# Patient Record
Sex: MALE | Race: WHITE | Employment: OTHER | URBAN - METROPOLITAN AREA
[De-identification: names, ages, dates, MRNs, and addresses within clinical notes are randomized per-mention and may not be internally consistent; named-entity substitution may affect disease eponyms.]

---

## 2018-12-11 ENCOUNTER — HOSPITAL ENCOUNTER (INPATIENT)
Age: 83
LOS: 1 days | Discharge: HOME | DRG: 301 | End: 2018-12-12
Attending: EMERGENCY MEDICINE | Admitting: INTERNAL MEDICINE
Payer: MEDICARE

## 2018-12-11 ENCOUNTER — APPOINTMENT (OUTPATIENT)
Dept: CT IMAGING | Age: 83
DRG: 301 | End: 2018-12-11
Attending: EMERGENCY MEDICINE
Payer: MEDICARE

## 2018-12-11 ENCOUNTER — APPOINTMENT (OUTPATIENT)
Dept: GENERAL RADIOLOGY | Age: 83
DRG: 301 | End: 2018-12-11
Attending: EMERGENCY MEDICINE
Payer: MEDICARE

## 2018-12-11 DIAGNOSIS — D73.5 SPLENIC INFARCT: Primary | ICD-10-CM

## 2018-12-11 DIAGNOSIS — I82.890 SPLENIC VEIN THROMBOSIS: ICD-10-CM

## 2018-12-11 PROBLEM — N40.0 PROSTATE ENLARGEMENT: Status: ACTIVE | Noted: 2018-12-11

## 2018-12-11 LAB
ALBUMIN SERPL-MCNC: 3.5 G/DL (ref 3.5–5)
ALBUMIN/GLOB SERPL: 1.1 {RATIO} (ref 1.1–2.2)
ALP SERPL-CCNC: 103 U/L (ref 45–117)
ALT SERPL-CCNC: 20 U/L (ref 12–78)
ANION GAP SERPL CALC-SCNC: 9 MMOL/L (ref 5–15)
AST SERPL-CCNC: 21 U/L (ref 15–37)
BASOPHILS # BLD: 0.1 K/UL (ref 0–0.1)
BASOPHILS NFR BLD: 1 % (ref 0–1)
BILIRUB SERPL-MCNC: 0.6 MG/DL (ref 0.2–1)
BUN SERPL-MCNC: 14 MG/DL (ref 6–20)
BUN/CREAT SERPL: 12 (ref 12–20)
CALCIUM SERPL-MCNC: 8.8 MG/DL (ref 8.5–10.1)
CHLORIDE SERPL-SCNC: 100 MMOL/L (ref 97–108)
CO2 SERPL-SCNC: 31 MMOL/L (ref 21–32)
COMMENT, HOLDF: NORMAL
CREAT SERPL-MCNC: 1.18 MG/DL (ref 0.7–1.3)
D DIMER PPP FEU-MCNC: 1.92 MG/L FEU (ref 0–0.65)
DIFFERENTIAL METHOD BLD: NORMAL
EOSINOPHIL # BLD: 0.1 K/UL (ref 0–0.4)
EOSINOPHIL NFR BLD: 1 % (ref 0–7)
ERYTHROCYTE [DISTWIDTH] IN BLOOD BY AUTOMATED COUNT: 13 % (ref 11.5–14.5)
GLOBULIN SER CALC-MCNC: 3.3 G/DL (ref 2–4)
GLUCOSE SERPL-MCNC: 103 MG/DL (ref 65–100)
HCT VFR BLD AUTO: 45.5 % (ref 36.6–50.3)
HGB BLD-MCNC: 15.3 G/DL (ref 12.1–17)
IMM GRANULOCYTES # BLD: 0 K/UL (ref 0–0.04)
IMM GRANULOCYTES NFR BLD AUTO: 0 % (ref 0–0.5)
LACTATE SERPL-SCNC: 1.1 MMOL/L (ref 0.4–2)
LYMPHOCYTES # BLD: 2.7 K/UL (ref 0.8–3.5)
LYMPHOCYTES NFR BLD: 27 % (ref 12–49)
MCH RBC QN AUTO: 32.1 PG (ref 26–34)
MCHC RBC AUTO-ENTMCNC: 33.6 G/DL (ref 30–36.5)
MCV RBC AUTO: 95.6 FL (ref 80–99)
MONOCYTES # BLD: 1 K/UL (ref 0–1)
MONOCYTES NFR BLD: 10 % (ref 5–13)
NEUTS SEG # BLD: 6 K/UL (ref 1.8–8)
NEUTS SEG NFR BLD: 61 % (ref 32–75)
NRBC # BLD: 0 K/UL (ref 0–0.01)
NRBC BLD-RTO: 0 PER 100 WBC
PLATELET # BLD AUTO: 163 K/UL (ref 150–400)
PMV BLD AUTO: 9.4 FL (ref 8.9–12.9)
POTASSIUM SERPL-SCNC: 3.9 MMOL/L (ref 3.5–5.1)
PROT SERPL-MCNC: 6.8 G/DL (ref 6.4–8.2)
RBC # BLD AUTO: 4.76 M/UL (ref 4.1–5.7)
SAMPLES BEING HELD,HOLD: NORMAL
SODIUM SERPL-SCNC: 140 MMOL/L (ref 136–145)
TROPONIN I SERPL-MCNC: <0.05 NG/ML
WBC # BLD AUTO: 9.9 K/UL (ref 4.1–11.1)

## 2018-12-11 PROCEDURE — 99285 EMERGENCY DEPT VISIT HI MDM: CPT

## 2018-12-11 PROCEDURE — 36415 COLL VENOUS BLD VENIPUNCTURE: CPT

## 2018-12-11 PROCEDURE — 83605 ASSAY OF LACTIC ACID: CPT

## 2018-12-11 PROCEDURE — 85025 COMPLETE CBC W/AUTO DIFF WBC: CPT

## 2018-12-11 PROCEDURE — 74177 CT ABD & PELVIS W/CONTRAST: CPT

## 2018-12-11 PROCEDURE — 84484 ASSAY OF TROPONIN QUANT: CPT

## 2018-12-11 PROCEDURE — 85379 FIBRIN DEGRADATION QUANT: CPT

## 2018-12-11 PROCEDURE — 93005 ELECTROCARDIOGRAM TRACING: CPT

## 2018-12-11 PROCEDURE — 71045 X-RAY EXAM CHEST 1 VIEW: CPT

## 2018-12-11 PROCEDURE — 80053 COMPREHEN METABOLIC PANEL: CPT

## 2018-12-11 PROCEDURE — 74011636320 HC RX REV CODE- 636/320: Performed by: EMERGENCY MEDICINE

## 2018-12-11 PROCEDURE — 85730 THROMBOPLASTIN TIME PARTIAL: CPT

## 2018-12-11 PROCEDURE — 85610 PROTHROMBIN TIME: CPT

## 2018-12-11 RX ADMIN — IOPAMIDOL 100 ML: 755 INJECTION, SOLUTION INTRAVENOUS at 23:29

## 2018-12-12 ENCOUNTER — APPOINTMENT (OUTPATIENT)
Dept: ULTRASOUND IMAGING | Age: 83
DRG: 301 | End: 2018-12-12
Attending: INTERNAL MEDICINE
Payer: MEDICARE

## 2018-12-12 VITALS
BODY MASS INDEX: 27.09 KG/M2 | SYSTOLIC BLOOD PRESSURE: 132 MMHG | RESPIRATION RATE: 19 BRPM | TEMPERATURE: 98.7 F | OXYGEN SATURATION: 94 % | HEART RATE: 67 BPM | WEIGHT: 200 LBS | DIASTOLIC BLOOD PRESSURE: 73 MMHG | HEIGHT: 72 IN

## 2018-12-12 PROBLEM — I82.890 SPLENIC VEIN THROMBOSIS: Status: ACTIVE | Noted: 2018-12-12

## 2018-12-12 LAB
APPEARANCE UR: CLEAR
APTT PPP: 26.3 SEC (ref 22.1–32)
ATRIAL RATE: 69 BPM
BACTERIA URNS QL MICRO: NEGATIVE /HPF
BILIRUB UR QL: NEGATIVE
CALCULATED P AXIS, ECG09: 55 DEGREES
CALCULATED R AXIS, ECG10: 5 DEGREES
CALCULATED T AXIS, ECG11: 90 DEGREES
CK MB CFR SERPL CALC: 1.5 % (ref 0–2.5)
CK MB CFR SERPL CALC: 1.7 % (ref 0–2.5)
CK MB SERPL-MCNC: 1.6 NG/ML (ref 5–25)
CK MB SERPL-MCNC: 1.7 NG/ML (ref 5–25)
CK SERPL-CCNC: 112 U/L (ref 39–308)
CK SERPL-CCNC: 93 U/L (ref 39–308)
COLOR UR: NORMAL
COMMENT, HOLDF: NORMAL
DIAGNOSIS, 93000: NORMAL
EPITH CASTS URNS QL MICRO: NORMAL /LPF
GLUCOSE UR STRIP.AUTO-MCNC: NEGATIVE MG/DL
HGB UR QL STRIP: NEGATIVE
HYALINE CASTS URNS QL MICRO: NORMAL /LPF (ref 0–5)
INR PPP: 1.1 (ref 0.9–1.1)
KETONES UR QL STRIP.AUTO: NEGATIVE MG/DL
LEUKOCYTE ESTERASE UR QL STRIP.AUTO: NEGATIVE
NITRITE UR QL STRIP.AUTO: NEGATIVE
P-R INTERVAL, ECG05: 198 MS
PH UR STRIP: 7.5 [PH] (ref 5–8)
PROT UR STRIP-MCNC: NEGATIVE MG/DL
PROTHROMBIN TIME: 10.8 SEC (ref 9–11.1)
Q-T INTERVAL, ECG07: 430 MS
QRS DURATION, ECG06: 136 MS
QTC CALCULATION (BEZET), ECG08: 460 MS
RBC #/AREA URNS HPF: NORMAL /HPF (ref 0–5)
SAMPLES BEING HELD,HOLD: NORMAL
SP GR UR REFRACTOMETRY: 1.03 (ref 1–1.03)
THERAPEUTIC RANGE,PTTT: NORMAL SECS (ref 58–77)
TROPONIN I SERPL-MCNC: <0.05 NG/ML
TROPONIN I SERPL-MCNC: <0.05 NG/ML
UR CULT HOLD, URHOLD: NORMAL
UROBILINOGEN UR QL STRIP.AUTO: 0.2 EU/DL (ref 0.2–1)
VENTRICULAR RATE, ECG03: 69 BPM
WBC URNS QL MICRO: NORMAL /HPF (ref 0–4)

## 2018-12-12 PROCEDURE — 81001 URINALYSIS AUTO W/SCOPE: CPT

## 2018-12-12 PROCEDURE — 85307 ASSAY ACTIVATED PROTEIN C: CPT

## 2018-12-12 PROCEDURE — 36415 COLL VENOUS BLD VENIPUNCTURE: CPT

## 2018-12-12 PROCEDURE — 85303 CLOT INHIBIT PROT C ACTIVITY: CPT

## 2018-12-12 PROCEDURE — 74011250636 HC RX REV CODE- 250/636: Performed by: INTERNAL MEDICINE

## 2018-12-12 PROCEDURE — 85305 CLOT INHIBIT PROT S TOTAL: CPT

## 2018-12-12 PROCEDURE — 81240 F2 GENE: CPT

## 2018-12-12 PROCEDURE — 85302 CLOT INHIBIT PROT C ANTIGEN: CPT

## 2018-12-12 PROCEDURE — 81270 JAK2 GENE: CPT

## 2018-12-12 PROCEDURE — 82668 ASSAY OF ERYTHROPOIETIN: CPT

## 2018-12-12 PROCEDURE — 86146 BETA-2 GLYCOPROTEIN ANTIBODY: CPT

## 2018-12-12 PROCEDURE — 85300 ANTITHROMBIN III ACTIVITY: CPT

## 2018-12-12 PROCEDURE — 96372 THER/PROPH/DIAG INJ SC/IM: CPT

## 2018-12-12 PROCEDURE — 85613 RUSSELL VIPER VENOM DILUTED: CPT

## 2018-12-12 PROCEDURE — 74011250637 HC RX REV CODE- 250/637: Performed by: NURSE PRACTITIONER

## 2018-12-12 PROCEDURE — 84484 ASSAY OF TROPONIN QUANT: CPT

## 2018-12-12 PROCEDURE — 65660000000 HC RM CCU STEPDOWN

## 2018-12-12 PROCEDURE — C8929 TTE W OR WO FOL WCON,DOPPLER: HCPCS

## 2018-12-12 PROCEDURE — 85306 CLOT INHIBIT PROT S FREE: CPT

## 2018-12-12 PROCEDURE — 74011250637 HC RX REV CODE- 250/637: Performed by: INTERNAL MEDICINE

## 2018-12-12 PROCEDURE — 86147 CARDIOLIPIN ANTIBODY EA IG: CPT

## 2018-12-12 PROCEDURE — 76700 US EXAM ABDOM COMPLETE: CPT

## 2018-12-12 PROCEDURE — 81241 F5 GENE: CPT

## 2018-12-12 PROCEDURE — 82553 CREATINE MB FRACTION: CPT

## 2018-12-12 PROCEDURE — 82105 ALPHA-FETOPROTEIN SERUM: CPT

## 2018-12-12 RX ORDER — ACETAMINOPHEN 325 MG/1
650 TABLET ORAL
Status: SHIPPED | COMMUNITY
Start: 2018-12-12 | End: 2020-09-30

## 2018-12-12 RX ORDER — HYDROCHLOROTHIAZIDE 25 MG/1
12.5 TABLET ORAL DAILY
Status: DISCONTINUED | OUTPATIENT
Start: 2018-12-12 | End: 2018-12-12 | Stop reason: HOSPADM

## 2018-12-12 RX ORDER — LOSARTAN POTASSIUM 50 MG/1
50 TABLET ORAL DAILY
Status: DISCONTINUED | OUTPATIENT
Start: 2018-12-12 | End: 2018-12-12 | Stop reason: HOSPADM

## 2018-12-12 RX ORDER — ASCORBIC ACID 500 MG
500 TABLET ORAL DAILY
COMMUNITY

## 2018-12-12 RX ORDER — SODIUM CHLORIDE 0.9 % (FLUSH) 0.9 %
5-10 SYRINGE (ML) INJECTION EVERY 8 HOURS
Status: DISCONTINUED | OUTPATIENT
Start: 2018-12-12 | End: 2018-12-12 | Stop reason: HOSPADM

## 2018-12-12 RX ORDER — ENOXAPARIN SODIUM 100 MG/ML
1 INJECTION SUBCUTANEOUS EVERY 12 HOURS
Status: DISCONTINUED | OUTPATIENT
Start: 2018-12-12 | End: 2018-12-12

## 2018-12-12 RX ORDER — LANOLIN ALCOHOL/MO/W.PET/CERES
400 CREAM (GRAM) TOPICAL DAILY
COMMUNITY
End: 2019-07-06

## 2018-12-12 RX ORDER — NALOXONE HYDROCHLORIDE 0.4 MG/ML
0.4 INJECTION, SOLUTION INTRAMUSCULAR; INTRAVENOUS; SUBCUTANEOUS AS NEEDED
Status: DISCONTINUED | OUTPATIENT
Start: 2018-12-12 | End: 2018-12-12 | Stop reason: HOSPADM

## 2018-12-12 RX ORDER — ENOXAPARIN SODIUM 100 MG/ML
1 INJECTION SUBCUTANEOUS EVERY 24 HOURS
Status: DISCONTINUED | OUTPATIENT
Start: 2018-12-12 | End: 2018-12-12

## 2018-12-12 RX ORDER — LANOLIN ALCOHOL/MO/W.PET/CERES
1000 CREAM (GRAM) TOPICAL DAILY
COMMUNITY
End: 2019-07-06

## 2018-12-12 RX ORDER — SODIUM CHLORIDE 0.9 % (FLUSH) 0.9 %
5-10 SYRINGE (ML) INJECTION AS NEEDED
Status: DISCONTINUED | OUTPATIENT
Start: 2018-12-12 | End: 2018-12-12 | Stop reason: HOSPADM

## 2018-12-12 RX ORDER — GLUCOSAMINE SULFATE 1500 MG
1000 POWDER IN PACKET (EA) ORAL DAILY
COMMUNITY
End: 2019-07-03

## 2018-12-12 RX ORDER — ENOXAPARIN SODIUM 100 MG/ML
40 INJECTION SUBCUTANEOUS EVERY 24 HOURS
Status: DISCONTINUED | OUTPATIENT
Start: 2018-12-12 | End: 2018-12-12

## 2018-12-12 RX ORDER — LOSARTAN POTASSIUM AND HYDROCHLOROTHIAZIDE 12.5; 5 MG/1; MG/1
1 TABLET ORAL DAILY
COMMUNITY
End: 2018-12-26

## 2018-12-12 RX ORDER — ACETAMINOPHEN 325 MG/1
650 TABLET ORAL
Status: DISCONTINUED | OUTPATIENT
Start: 2018-12-12 | End: 2018-12-12 | Stop reason: HOSPADM

## 2018-12-12 RX ADMIN — PERFLUTREN 2 ML: 6.52 INJECTION, SUSPENSION INTRAVENOUS at 11:06

## 2018-12-12 RX ADMIN — HYDROCHLOROTHIAZIDE 12.5 MG: 25 TABLET ORAL at 07:56

## 2018-12-12 RX ADMIN — APIXABAN 10 MG: 5 TABLET, FILM COATED ORAL at 14:54

## 2018-12-12 RX ADMIN — Medication 10 ML: at 01:22

## 2018-12-12 RX ADMIN — LOSARTAN POTASSIUM 50 MG: 50 TABLET, FILM COATED ORAL at 07:55

## 2018-12-12 RX ADMIN — ENOXAPARIN SODIUM 40 MG: 40 INJECTION SUBCUTANEOUS at 01:21

## 2018-12-12 RX ADMIN — ACETAMINOPHEN 650 MG: 325 TABLET, FILM COATED ORAL at 01:27

## 2018-12-12 NOTE — H&P
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 Unclear etiology. No history of pancreatitis. No hx of ETOH use or cirrhosis. Check AFP, hypercoagulable workup. Hx of LE DVT ~40 years ago. Given indeterminate chronicity of these findings (had abdominal pain 3-4 weeks ago, then starting again 1 week ago, worsening over the last 2 days), unclear role of anticoagulation. Consult hem/onc and GI for input. Check abdominal US. Chest pain, unspecified (): unclear if this is related to his splenic infarct / vein thrombosis; however pain seems to be pleuritic. No tachycardia or hypoxia to suggest PE. Positive D-dimer likely related to the above. As he had vague pain radiating to his jaw/neck, will check serial cardiac biomarkers and consult cardiology. Benign hypertension (): a bit on the higher side, but stable. Continue losartan / HCTZ. LBBB (left bundle branch block) (): chronic      Prostate enlargement (2018): evident on CT. Outpatient follow up. Pt does have a family hx of prostate CA      Code Status: FULL     Surrogate decision maker: wife      ED notes and lab results reviewed.        Total time spent with patient: 79 Minutes   Time spent in the care of this patient included reviewing records, discussing with nursing, obtaining history and examining the patient, and discussing treatment plans, with >50% time spent counseling/coordinating care    Risk of deterioration: High                 Care Plan discussed with: ED provider, Patient, Family, Nursing Staff and >50% of time spent in counseling and coordination of care    Discussed:  Code Status, Care Plan and D/C Planning    Prophylaxis:  Lovenox    Disposition:  Home w/Family                 Subjective:     CHIEF COMPLAINT: abdominal pain    HISTORY OF PRESENT ILLNESS:     Mr. Laura Jarrett is a 82 y.o. male w/ hx of HTN who presents with abdominal pain. Started ~3-4 weeks ago, given abx by PCP who diagnosed him with diverticulitis. Pain localized in LUQ, sharp, intermittent, resolved after 5 days. Then one week ago, same pain returned, worsening in the past 2 days, associated with left-side chest pain/jaw/neck pain, no dyspnea. No dizziness. No fevers or chills. No melena, hematochezia, BRBPR, or diarrhea. ED workup included CT a/p showing findings suggesting splenic infarcts and splenic vein thrombosis.      Mr. Laura Jarrett is admitted for further evaluation and management. Past Medical History:   Diagnosis Date    Benign hypertension     Chest pain, unspecified     Dyspnea     Hypertension     LBBB (left bundle branch block)     Palpitations         Past Surgical History:   Procedure Laterality Date    HX TONSIL AND ADENOIDECTOMY         Social History     Tobacco Use    Smoking status: Never Smoker   Substance Use Topics    Alcohol use: No        Family History: family history of prostate cancer    Allergies   Allergen Reactions    Beta Blocker [Beta-Blockers (Beta-Adrenergic Blocking Agts)] Other (comments)     \"It gives me a heart attack\", per pt med causes chest to swell and chest pain.  Pcn [Penicillins] Unknown (comments)        Prior to Admission medications    Medication Sig Start Date End Date Taking? Authorizing Provider   TRIAMTERENE PO Take 25 mg by mouth every Monday, Wednesday, Friday.     Other, MD Vanessa       Review of Systems:  (bold if positive, if negative)    Gen:  Eyes:  ENT:  CVS:  chest painPulm:  GI:  Abdominal pain, nausea,  :    MS:  Skin:  Psych:  Endo:    Hem:  Renal:    Neuro:            Objective:      VITALS:    Vital signs reviewed; most recent are:    Visit Vitals  /82 Pulse 62   Temp 98.3 °F (36.8 °C)   Resp 17   Ht 6' (1.829 m)   Wt 90.7 kg (200 lb)   SpO2 96%   BMI 27.12 kg/m²     SpO2 Readings from Last 6 Encounters:   12/11/18 96%   03/12/13 93%   01/07/12 96%        No intake or output data in the 24 hours ending 12/12/18 0015         Exam:     Physical Exam:    Gen:  Well-developed, well-nourished, in no acute distress  HEENT:  No scleral icterus, PERRL, hearing intact to voice, moist mucous membranes  Neck:  Supple, without masses. Thyroid non-tender  Resp:  No accessory muscle use. CTAB without wheezing, rales, rhonchi  Card: RRR. Normal S1 and S2 without murmurs, rubs, or gallops. No peripheral lower extremity edema. No JVD. Peripheral pulses in tact. Abd:  Normoactive bowel sounds. Soft, mild LUQ TTP. Tympanic, mild distension. No rebound, no guarding. Lymph:  No cervical adenopathy  Musc:  No cyanosis or clubbing  Skin:  No rashes or ulcers; turgor intact. Cap refill ~2 secs  Neuro:  Cranial nerves are grossly intact, no focal motor weakness, follows commands appropriately  Psych:  Good insight, normal affect. Alert, oriented x 3. Answers questions appropriately       Labs:    Recent Labs     12/11/18  2258   WBC 9.9   HGB 15.3   HCT 45.5        Recent Labs     12/11/18  2258      K 3.9      CO2 31   *   BUN 14   CREA 1.18   CA 8.8   ALB 3.5   SGOT 21   ALT 20     No components found for: GLPOC  No results for input(s): PH, PCO2, PO2, HCO3, FIO2 in the last 72 hours. No results for input(s): INR in the last 72 hours. No lab exists for component: INREXT  No results found for: SDES  No results found for: CULT  All other current labs reviewed in the computer. Imaging/Studies:    CT a/p:   1. Geographic areas of low attenuation in the spleen suggesting infarcts. There  is low attenuation in the splenic vein relative to the superior mesenteric vein  and portal vein suggesting splenic vein thrombosis.  These findings are of  indeterminate acuity. They may represent sequelae of prior acute pancreatitis,  although there are no findings to suggest acute pancreatitis on the current  study.     2. Hepatic steatosis.      3.  Cholelithiasis.      EKG: JESSICA, JACOB  EKG personally reviewed    ___________________________________________________    Attending Physician: Riri Bess MD

## 2018-12-12 NOTE — PROGRESS NOTES
[facility]: MED RECONCILIATION    Comments/Recommendations:      Pt reports losartan/HCTZ as his only prescription medication. He reports this is a new blood pressure medication for him.  Pt takes several OTC supplements, but irregularly (a few days a week for each).  Pt reports taking some sort of herbal pill for constipation that he could not identify. Cary Pool it was not senna. Medications added:     · Losartan/HCTZ  · J Carlos  · Magnesium  · Vitamin B12  · Vitamin C  · Vitamin D3    Medications removed:    · Triamterene       Information obtained from: Patient, spouse, prescription bottle    Allergies: Beta blocker [beta-blockers (beta-adrenergic blocking agts)] and Pcn [penicillins]    Prior to Admission Medications:   Prior to Admission Medications   Prescriptions Last Dose Informant Patient Reported? Taking? J CARLOS PO  Self Yes Yes   Sig: Take 1 Tab by mouth daily. ascorbic acid, vitamin C, (VITAMIN C) 500 mg tablet  Self Yes Yes   Sig: Take 500 mg by mouth daily. cholecalciferol (VITAMIN D3) 1,000 unit cap  Self Yes Yes   Sig: Take 1,000 Units by mouth daily. cyanocobalamin 1,000 mcg tablet  Self Yes Yes   Sig: Take 1,000 mcg by mouth daily. losartan-hydroCHLOROthiazide (HYZAAR) 50-12.5 mg per tablet 12/11/2018 at AM Self Yes Yes   Sig: Take 1 Tab by mouth daily. magnesium oxide (MAG-OX) 400 mg tablet  Self Yes Yes   Sig: Take 400 mg by mouth daily.       Facility-Administered Medications: None       Norberto Person  Reviewed and approved    Sharmaine Reynaga, LaurelD, BCPS

## 2018-12-12 NOTE — CONSULTS
Gastroenterology Consultation Note      Admit Date: 12/11/2018  Consult Date: 12/12/2018   I greatly appreciate your asking me to see Dasia Ni, thank you very much for the opportunity to participate in his care. Narrative Assessment and Plan   · LUQ pain  · Splenic vein thrombosis    81yo male presents with LUQ pain and imaging suggests splenic infarcts and splenic vein thrombosis. He does not having any history of predisposing conditions (no pancreatitis, cirrhosis, acute infection, malignancy). His acute onset of symptoms favor acute > chronic splenic vein thrombosis. Plan  - agree with hypercoagulable workup  - believe anticoagulation warranted, will review case with Dr. Jose Lema   - no plans for endoscopic testing at this time  - following    Attending evaluation (Dr. Jose Lema) to follow    -------------  Felipe Us. Jose Lema MD  [phone] office  [phone] voicemail   I have personally reviewed the history and independently examined the patient. I have reviewed the chart and agree with the documentation recorded by the Mid Level Provider, including the assessment, treatment plan, and disposition. ASSESSMENT AND PLAN:  I see no evidence of a GI primary. The spleen is more of a hematologic organ that a digestive one; so I suggest hematology consultation for requisite coagulopathy evaluation and determination of duration of anticoagulation. Huber Sullivan MD        Subjective:     Chief Complaint: LUQ pain    History of Present Illness: Dasia Ni is a 82 y.o. with PMH of HTN and LBB who presents with complaints of LUQ pain x 3-4 weeks. Patient describes a constant aching pain, but increased sharp pain with deep breaths or coughing. He was evaluated by PCP and given a course of antibiotics for suspected diverticulitis. Patient states that after 5 days of Flagyl he noticed improvement in his pain. However, once medication stopped his symptoms returned.  Denies associated fever, chills, SOB, nausea, vomiting, diarrhea, or change in appetite. Admits to gradual 10lb weight loss over the past 2-3 months with monitoring his PO intake. No known history of liver disease, pancreatitis, abdominal trauma, or malignancy. Had a DVT about 30-40 years ago. Last colonoscopy 2011 (Dr. Cailin Roger) - single colon polyp    In ED: CT scan suggests splenic infarcts and splenic vein throombosis, hepatic steatosis, and cholelithiasis. Ultrasound unremarkable. No lab abnormalities to suggest cirrhosis. PCP:  Trace Quintanilla MD    Past Medical History:   Diagnosis Date    Benign hypertension     Chest pain, unspecified     Dyspnea     Hypertension     LBBB (left bundle branch block)     Palpitations         Past Surgical History:   Procedure Laterality Date    HX TONSIL AND ADENOIDECTOMY         Social History     Tobacco Use    Smoking status: Never Smoker   Substance Use Topics    Alcohol use: No        No family history on file. Allergies   Allergen Reactions    Beta Blocker [Beta-Blockers (Beta-Adrenergic Blocking Agts)] Other (comments)     \"It gives me a heart attack\", per pt med causes chest to swell and chest pain.  Pcn [Penicillins] Unknown (comments)            Home Medications:  Prior to Admission Medications   Prescriptions Last Dose Informant Patient Reported? Taking? J CARLOS PO  Self Yes Yes   Sig: Take 1 Tab by mouth daily. ascorbic acid, vitamin C, (VITAMIN C) 500 mg tablet  Self Yes Yes   Sig: Take 500 mg by mouth daily. cholecalciferol (VITAMIN D3) 1,000 unit cap  Self Yes Yes   Sig: Take 1,000 Units by mouth daily. cyanocobalamin 1,000 mcg tablet  Self Yes Yes   Sig: Take 1,000 mcg by mouth daily. losartan-hydroCHLOROthiazide (HYZAAR) 50-12.5 mg per tablet 12/11/2018 at AM Self Yes Yes   Sig: Take 1 Tab by mouth daily. magnesium oxide (MAG-OX) 400 mg tablet  Self Yes Yes   Sig: Take 400 mg by mouth daily.       Facility-Administered Medications: None       Hospital Medications:  Current Facility-Administered Medications   Medication Dose Route Frequency    sodium chloride (NS) flush 5-10 mL  5-10 mL IntraVENous Q8H    sodium chloride (NS) flush 5-10 mL  5-10 mL IntraVENous PRN    acetaminophen (TYLENOL) tablet 650 mg  650 mg Oral Q6H PRN    naloxone (NARCAN) injection 0.4 mg  0.4 mg IntraVENous PRN    enoxaparin (LOVENOX) injection 40 mg  40 mg SubCUTAneous Q24H    losartan (COZAAR) tablet 50 mg  50 mg Oral DAILY    hydroCHLOROthiazide (HYDRODIURIL) tablet 12.5 mg  12.5 mg Oral DAILY     Current Outpatient Medications   Medication Sig    losartan-hydroCHLOROthiazide (HYZAAR) 50-12.5 mg per tablet Take 1 Tab by mouth daily.  J CARLOS PO Take 1 Tab by mouth daily.  magnesium oxide (MAG-OX) 400 mg tablet Take 400 mg by mouth daily.  cholecalciferol (VITAMIN D3) 1,000 unit cap Take 1,000 Units by mouth daily.  cyanocobalamin 1,000 mcg tablet Take 1,000 mcg by mouth daily.  ascorbic acid, vitamin C, (VITAMIN C) 500 mg tablet Take 500 mg by mouth daily.        Review of Systems: Admission ROS by Quinten Hua MD from 12/11/2018 were reviewed with the patient and changes (other than per HPI) include: none      Objective:     Physical Exam:  Visit Vitals  /80 (BP 1 Location: Right arm, BP Patient Position: At rest;Sitting)   Pulse 76   Temp 99.1 °F (37.3 °C)   Resp 14   Ht 6' (1.829 m)   Wt 90.7 kg (200 lb)   SpO2 91%   BMI 27.12 kg/m²     SpO2 Readings from Last 6 Encounters:   12/12/18 91%   03/12/13 93%   01/07/12 96%            Intake/Output Summary (Last 24 hours) at 12/12/2018 0860  Last data filed at 12/12/2018 0757  Gross per 24 hour   Intake    Output 800 ml   Net -800 ml      General: no distress, comfortable  Skin:  No rash or palpable dermatologic mass lesions  HEENT: Pupils equal, sclera anicteric, oropharynx with no gross lesions  Cardiovascular: No abnormal audible heart sounds, well perfused, no edema  Respiratory:  No abnormal audible breath sounds, normal respiratory effort, no throacic deformity  GI: bowel sounds present, soft, nondistended, mild LUQ tenderness. No rebound or guarding. Musculoskeletal:  No skeletal deformity nor acute arthritis noted. Neurological:  Motor and sensory function intact in upper extremeties  Psychiatric:  Normal affect, memory intact, appears to have insight into current illness  Lymphatic:  No periumbilic lymphadenopathy    Laboratory:    Recent Results (from the past 24 hour(s))   EKG, 12 LEAD, INITIAL    Collection Time: 12/11/18 10:23 PM   Result Value Ref Range    Ventricular Rate 69 BPM    Atrial Rate 69 BPM    P-R Interval 198 ms    QRS Duration 136 ms    Q-T Interval 430 ms    QTC Calculation (Bezet) 460 ms    Calculated P Axis 55 degrees    Calculated R Axis 5 degrees    Calculated T Axis 90 degrees    Diagnosis       Normal sinus rhythm  Left bundle branch block  Abnormal ECG  When compared with ECG of 12-MAR-2013 06:17,  No significant change was found     SAMPLES BEING HELD    Collection Time: 12/11/18 10:58 PM   Result Value Ref Range    SAMPLES BEING HELD 1red, 1sst     COMMENT        Add-on orders for these samples will be processed based on acceptable specimen integrity and analyte stability, which may vary by analyte. CBC WITH AUTOMATED DIFF    Collection Time: 12/11/18 10:58 PM   Result Value Ref Range    WBC 9.9 4.1 - 11.1 K/uL    RBC 4.76 4.10 - 5.70 M/uL    HGB 15.3 12.1 - 17.0 g/dL    HCT 45.5 36.6 - 50.3 %    MCV 95.6 80.0 - 99.0 FL    MCH 32.1 26.0 - 34.0 PG    MCHC 33.6 30.0 - 36.5 g/dL    RDW 13.0 11.5 - 14.5 %    PLATELET 173 270 - 737 K/uL    MPV 9.4 8.9 - 12.9 FL    NRBC 0.0 0  WBC    ABSOLUTE NRBC 0.00 0.00 - 0.01 K/uL    NEUTROPHILS 61 32 - 75 %    LYMPHOCYTES 27 12 - 49 %    MONOCYTES 10 5 - 13 %    EOSINOPHILS 1 0 - 7 %    BASOPHILS 1 0 - 1 %    IMMATURE GRANULOCYTES 0 0.0 - 0.5 %    ABS. NEUTROPHILS 6.0 1.8 - 8.0 K/UL    ABS. LYMPHOCYTES 2.7 0.8 - 3.5 K/UL    ABS. MONOCYTES 1.0 0.0 - 1.0 K/UL    ABS. EOSINOPHILS 0.1 0.0 - 0.4 K/UL    ABS. BASOPHILS 0.1 0.0 - 0.1 K/UL    ABS. IMM. GRANS. 0.0 0.00 - 0.04 K/UL    DF AUTOMATED     METABOLIC PANEL, COMPREHENSIVE    Collection Time: 12/11/18 10:58 PM   Result Value Ref Range    Sodium 140 136 - 145 mmol/L    Potassium 3.9 3.5 - 5.1 mmol/L    Chloride 100 97 - 108 mmol/L    CO2 31 21 - 32 mmol/L    Anion gap 9 5 - 15 mmol/L    Glucose 103 (H) 65 - 100 mg/dL    BUN 14 6 - 20 MG/DL    Creatinine 1.18 0.70 - 1.30 MG/DL    BUN/Creatinine ratio 12 12 - 20      GFR est AA >60 >60 ml/min/1.73m2    GFR est non-AA 59 (L) >60 ml/min/1.73m2    Calcium 8.8 8.5 - 10.1 MG/DL    Bilirubin, total 0.6 0.2 - 1.0 MG/DL    ALT (SGPT) 20 12 - 78 U/L    AST (SGOT) 21 15 - 37 U/L    Alk.  phosphatase 103 45 - 117 U/L    Protein, total 6.8 6.4 - 8.2 g/dL    Albumin 3.5 3.5 - 5.0 g/dL    Globulin 3.3 2.0 - 4.0 g/dL    A-G Ratio 1.1 1.1 - 2.2     TROPONIN I    Collection Time: 12/11/18 10:58 PM   Result Value Ref Range    Troponin-I, Qt. <0.05 <0.05 ng/mL   D DIMER    Collection Time: 12/11/18 10:58 PM   Result Value Ref Range    D-dimer 1.92 (H) 0.00 - 0.65 mg/L FEU   LACTIC ACID    Collection Time: 12/11/18 10:58 PM   Result Value Ref Range    Lactic acid 1.1 0.4 - 2.0 MMOL/L   PTT    Collection Time: 12/11/18 10:58 PM   Result Value Ref Range    aPTT 26.3 22.1 - 32.0 sec    aPTT, therapeutic range     58.0 - 77.0 SECS   PROTHROMBIN TIME + INR    Collection Time: 12/11/18 10:58 PM   Result Value Ref Range    INR 1.1 0.9 - 1.1      Prothrombin time 10.8 9.0 - 11.1 sec   URINALYSIS W/MICROSCOPIC    Collection Time: 12/12/18 12:00 AM   Result Value Ref Range    Color YELLOW/STRAW      Appearance CLEAR CLEAR      Specific gravity 1.030 1.003 - 1.030      pH (UA) 7.5 5.0 - 8.0      Protein NEGATIVE  NEG mg/dL    Glucose NEGATIVE  NEG mg/dL    Ketone NEGATIVE  NEG mg/dL    Bilirubin NEGATIVE  NEG      Blood NEGATIVE  NEG      Urobilinogen 0.2 0.2 - 1.0 EU/dL Nitrites NEGATIVE  NEG      Leukocyte Esterase NEGATIVE  NEG      WBC 0-4 0 - 4 /hpf    RBC 0-5 0 - 5 /hpf    Epithelial cells FEW FEW /lpf    Bacteria NEGATIVE  NEG /hpf    Hyaline cast 0-2 0 - 5 /lpf   URINE CULTURE HOLD SAMPLE    Collection Time: 12/12/18 12:00 AM   Result Value Ref Range    Urine culture hold        URINE ON HOLD IN MICROBIOLOGY DEPT FOR 3 DAYS. IF UNPRESERVED URINE IS SUBMITTED, IT CANNOT BE USED FOR ADDITIONAL TESTING AFTER 24 HRS, RECOLLECTION WILL BE REQUIRED. SAMPLES BEING HELD    Collection Time: 12/12/18 12:29 AM   Result Value Ref Range    SAMPLES BEING HELD LENY     COMMENT        Add-on orders for these samples will be processed based on acceptable specimen integrity and analyte stability, which may vary by analyte. TROPONIN I    Collection Time: 12/12/18  4:55 AM   Result Value Ref Range    Troponin-I, Qt. <0.05 <0.05 ng/mL   CK W/ CKMB & INDEX    Collection Time: 12/12/18  4:55 AM   Result Value Ref Range     39 - 308 U/L    CK - MB 1.7 <3.6 NG/ML    CK-MB Index 1.5 0 - 2.5     SAMPLES BEING HELD    Collection Time: 12/12/18  5:04 AM   Result Value Ref Range    SAMPLES BEING HELD LAV     COMMENT        Add-on orders for these samples will be processed based on acceptable specimen integrity and analyte stability, which may vary by analyte. Assessment/Plan:     Active Problems:    Chest pain, unspecified ()      Benign hypertension ()      LBBB (left bundle branch block) ()      Prostate enlargement (12/11/2018)      Splenic vein thrombosis (12/12/2018)         See above narrative for full detail.     Sameera Farris PA-C  12/12/18  8:44 AM

## 2018-12-12 NOTE — PROGRESS NOTES
SHIFT CHANGE:  1930 Bedside and Verbal shift change report given to Ángela JUAN (oncoming nurse) by Amy Mcneil (offgoing nurse). Report included the following information SBAR, Kardex, MAR and Recent Results. SHIFT SUMMARY:  2884  Patient moved to room ED20 for hold until bed available. 0128  lovenox administered, tylenol given for some residual abd pain. END OF SHIFT REPORT:  0730  Bedside and Verbal shift change report given to Cindy Solomon (oncoming nurse) by Bandar Rodriguez (offgoing nurse). Report included the following information SBAR, Kardex, MAR and Recent Results.

## 2018-12-12 NOTE — DISCHARGE INSTRUCTIONS
HOSPITALIST DISCHARGE INSTRUCTIONS  NAME: Ole Hammond   :  1935   MRN:  [id number]     Date/Time:  2018 2:04 PM    ADMIT DATE: 2018     DISCHARGE DATE: 2018     DISCHARGE DIAGNOSIS:  Splenic vein thrombosis with splenic infarcts    MEDICATIONS:  · It is important that you take the medication exactly as they are prescribed. · Keep your medication in the bottles provided by the pharmacist and keep a list of the medication names, dosages, and times to be taken in your wallet. · Do not take other medications without consulting your doctor. · Take TYLENOL (ACETAMINOPHEN) and NOT aspirin while on blood thinner  · Take Eliquis 5 mg 2 tabs twice a daily x 7 days and then 5 mg 1 tab twice daily x 6months    Pain Management: per above medications    What to do at Home      Recommended diet:  Regular Diet    Recommended activity: Activity as tolerated    If you experience any of the following symptoms then please call your primary care physician or return to the emergency room if you cannot get hold of your doctor:  Fever, chills, nausea, vomiting, diarrhea, change in mentation, falling, bleeding, shortness of breath    Follow Up: Follow up with Dr Moshe Nava  2019 at [address]      [phone]    Information obtained by :  I understand that if any problems occur once I am at home I am to contact my physician. I understand and acknowledge receipt of the instructions indicated above.                                                                                                                                            Physician's or R.N.'s Signature                                                                  Date/Time                                                                                                                                              Patient or Representative Signature Date/Time

## 2018-12-12 NOTE — PROGRESS NOTES
[address]  [address] [facility] 19  [phone]    Daily Progress Note      Admission plan reviewed with Dr. Carlos Pacheco and Alfredo Biggs. Sera Melendrez. Patient seen and examined. Wilmer Stephens feels better, pain is controlled.   The following changes to [address] [place] [address] were made: started full dose enoxaparin    Face to face time spent: 5 minutes      Nahomi Mobley MD  12/12/2018  1:26 PM

## 2018-12-12 NOTE — DISCHARGE SUMMARY
Physician Discharge Summary     Patient ID:  Orion Sharp  [id number]  82 y.o.  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)         Resolved Problems:  Problem List as of 12/12/2018 Date Reviewed: 12/11/2018          Codes Class Noted - Resolved    Splenic vein thrombosis ICD-10-CM: I82.890  ICD-9-CM: 289.59  12/12/2018 - Present        Prostate enlargement ICD-10-CM: N40.0  ICD-9-CM: 600.00  12/11/2018 - Present        Chest pain, unspecified ICD-10-CM: R07.9  ICD-9-CM: 786.50  Unknown - Present        Dyspnea ICD-10-CM: R06.00  ICD-9-CM: 786.09  Unknown - Present        Benign hypertension ICD-10-CM: I10  ICD-9-CM: 401.1  Unknown - Present        Palpitations ICD-10-CM: R00.2  ICD-9-CM: 785.1  Unknown - Present        LBBB (left bundle branch block) ICD-10-CM: I44.7  ICD-9-CM: 426.3  Unknown - Present                Hospital Course:   Mr. David Oleary was admitted to the Hospitalist Service for treatment of splenic vein thrombosis. He was evaluated by GI and Hem/Onc with recommendations for full dose anticougulation. Hem/Onc wrote for Eliquis and provided the patient with a script. He did not require any pain medication aside from Tylenol. He was evaluated by Cardiology for chest pain. All troponins were normal and an echocardiogram was unremarkable. Cardiology will arrange outpatient follow up for ischemia testing. He was discharged home on 12/12/2018 in improved condition. PCP: Johnny De Paz MD    Consults: Cardiology, GI and Hematology/Oncology    Discharge Exam:  See my Progress Note from today.     Disposition: home    Patient Instructions:   Current Discharge Medication List      START taking these medications    Details   acetaminophen (TYLENOL) 325 mg tablet Take 2 Tabs by mouth every six (6) hours as needed. CONTINUE these medications which have NOT CHANGED    Details   losartan-hydroCHLOROthiazide (HYZAAR) 50-12.5 mg per tablet Take 1 Tab by mouth daily. J CARLOS PO Take 1 Tab by mouth daily. magnesium oxide (MAG-OX) 400 mg tablet Take 400 mg by mouth daily. cholecalciferol (VITAMIN D3) 1,000 unit cap Take 1,000 Units by mouth daily. cyanocobalamin 1,000 mcg tablet Take 1,000 mcg by mouth daily. ascorbic acid, vitamin C, (VITAMIN C) 500 mg tablet Take 500 mg by mouth daily. Activity: Activity as tolerated  Diet: Cardiac Diet  Wound Care: None needed    Follow-up Information     Follow up With Specialties Details Why Anish Larsen MD General Practice  as directed [address] [place] [address]  [address]  [phone]      America Zayas MD Hematology and Oncology On 3/22/2019 appt at noon [address]  [phone]            35 minutes were spent on this discharge.     Signed:  Cristela Oneil MD  12/12/2018  2:15 PM

## 2018-12-12 NOTE — CONSULTS
Azucena Jarvis MD    [address] [facility] [place], [address] [facility]    Office [phone],[facility] [phone]  Pager [phone]    Date of  Admission: 12/11/2018 10:19 PM  PCP- David Pacheco MD    Cydney Hussein is a 82 y.o. male admitted for Splenic vein thrombosis. Consult requested by Hien Troncoso MD    Assessment/Plan    Abdominal pain-splenic infarct/splenic vein thrombosis  Atypical chest pain-troponin negative. History of left bundle branch block  Hypertension  History of allergy/? Intolerance to beta-blockers    Plan:  Serial troponins. Echocardiogram  Aspirin  Blood pressure controlled-on Cozaar/hydrochlorthiazide prior to admission-continue  Hematology/oncology-consulted  Recommend ischemia workup once underlying medical issues have improved/resolved. Can be pursued as outpatient if echocardiogram shows normal EF and serial troponins continued to be within normal limits. I appreciate the opportunity to be involved in Mr. Manuel Parnell. See below note for details. Please do not hesitate to contact us with questions or concerns. Azucena Jarvis MD    Cardiac Testing/ Procedures: A. Cardiac Cath/PCI:    B.ECHO/RICKY:    C.StressNuclear/Stress ECHO/Stress test:    D.Vascular:    E. EP:    F. Miscellaneous:    Care Plan discussed with: Patient and Nursing Staff    Subjective:  Patient is a 72-year-old male with history of hypertension and who was admitted for splenic vein thrombosis/splenic infarct. History of intermittent abdominal pain-left upper quadrant, moderate intensity for the past few weeks. No nausea, vomiting, melena, hematochezia. Hx  of mild left-sided chest pain this am. Pain with deep inspiration. No diaphoresis, dyspnea. Troponin negative. History of left bundle branch block-chronic. No prior history of CAD. Remote history of DVT.       Past Medical History:   Diagnosis Date    Benign hypertension     Chest pain, unspecified     Dyspnea  Hypertension     LBBB (left bundle branch block)     Palpitations       Past Surgical History:   Procedure Laterality Date    HX TONSIL AND ADENOIDECTOMY       Allergies   Allergen Reactions    Beta Blocker [Beta-Blockers (Beta-Adrenergic Blocking Agts)] Other (comments)     \"It gives me a heart attack\", per pt med causes chest to swell and chest pain.  Pcn [Penicillins] Unknown (comments)     No family history on file. Social History     Tobacco Use    Smoking status: Never Smoker   Substance Use Topics    Alcohol use: No    Drug use: Not on file          Medications:    (Not in a hospital admission)  Current Facility-Administered Medications   Medication Dose Route Frequency    sodium chloride (NS) flush 5-10 mL  5-10 mL IntraVENous Q8H    sodium chloride (NS) flush 5-10 mL  5-10 mL IntraVENous PRN    acetaminophen (TYLENOL) tablet 650 mg  650 mg Oral Q6H PRN    naloxone (NARCAN) injection 0.4 mg  0.4 mg IntraVENous PRN    enoxaparin (LOVENOX) injection 40 mg  40 mg SubCUTAneous Q24H    losartan (COZAAR) tablet 50 mg  50 mg Oral DAILY    hydroCHLOROthiazide (HYDRODIURIL) tablet 12.5 mg  12.5 mg Oral DAILY     Current Outpatient Medications   Medication Sig    losartan-hydroCHLOROthiazide (HYZAAR) 50-12.5 mg per tablet Take 1 Tab by mouth daily.  J CARLOS PO Take 1 Tab by mouth daily.  magnesium oxide (MAG-OX) 400 mg tablet Take 400 mg by mouth daily.  cholecalciferol (VITAMIN D3) 1,000 unit cap Take 1,000 Units by mouth daily.  cyanocobalamin 1,000 mcg tablet Take 1,000 mcg by mouth daily.  ascorbic acid, vitamin C, (VITAMIN C) 500 mg tablet Take 500 mg by mouth daily.          Review of Systems:  (bold if positive, if negative)    As in HPI-rest of review of systems noncontributory        Physical Exam:  Visit Vitals  /80 (BP 1 Location: Right arm, BP Patient Position: At rest;Sitting)   Pulse 76   Temp 99.1 °F (37.3 °C)   Resp 14   Ht 6' (1.829 m)   Wt 200 lb (90.7 kg)   SpO2 91%   BMI 27.12 kg/m²         Telemetry:     Gen: Well-developed, well-nourished, in no acute distress  HEENT:  Pink conjunctivae, hearing intact to voice, moist mucous membranes  Neck: Supple,No JVD, No Carotid Bruit, Thyroid- non tender  Resp: No accessory muscle use, Clear breath sounds, No rales or rhonchi  Card: Regular Rate,Rythm,Normal S1, S2, 2/6 systolic murmur present, no rubs or gallop. No thrills.    Abd:  Soft, non-tender, non-distended, normoactive bowel sounds are present,   MSK: No cyanosis or clubbing  Skin: No rashes or ulcers  Neuro:  moving all four extremities, no focal deficit, follows commands appropriately  Psych:  Good insight, oriented to person, place and time, alert, Nml Affect  LE: No edema  Vascular: Radial pulses 2+ and symmetric        EKG: Sinus rhythm, left bundle branch block (present on prior EKG)      Cxray:    LABS:        Lab Results   Component Value Date/Time    WBC 9.9 12/11/2018 10:58 PM    HGB 15.3 12/11/2018 10:58 PM    HCT 45.5 12/11/2018 10:58 PM    PLATELET 299 74/98/4785 10:58 PM    MCV 95.6 12/11/2018 10:58 PM     Lab Results   Component Value Date/Time    Sodium 140 12/11/2018 10:58 PM    Potassium 3.9 12/11/2018 10:58 PM    Chloride 100 12/11/2018 10:58 PM    CO2 31 12/11/2018 10:58 PM    Anion gap 9 12/11/2018 10:58 PM    Glucose 103 (H) 12/11/2018 10:58 PM    BUN 14 12/11/2018 10:58 PM    Creatinine 1.18 12/11/2018 10:58 PM    BUN/Creatinine ratio 12 12/11/2018 10:58 PM    GFR est AA >60 12/11/2018 10:58 PM    GFR est non-AA 59 (L) 12/11/2018 10:58 PM    Calcium 8.8 12/11/2018 10:58 PM     Lab Results   Component Value Date/Time     12/12/2018 04:55 AM    CK-MB Index 1.5 12/12/2018 04:55 AM    Troponin-I, Qt. <0.05 12/12/2018 04:55 AM     Lab Results   Component Value Date/Time    aPTT 26.3 12/11/2018 10:58 PM     Lab Results   Component Value Date/Time    INR 1.1 12/11/2018 10:58 PM    Prothrombin time 10.8 12/11/2018 10:58 PM     No results found for: BNP, BNPP, Martin Fothergill, MD

## 2018-12-12 NOTE — CONSULTS
[facility]  [phone]    Patient seen and examined. Full consult note to follow. Images personally reviewed. Age indeterminate splenic vein thrombosis and splenic infarcts. No clear predisposing factors by history. Given his recent symptoms, I favor treating this as an acute event, with 6 months of full dose anticoagulation. Consider apixaban, but I will hold on ordering this pending his GI consulutation. Agree with thrombophilia panel sent by hospitalist.  I will add epo and JAK2, given his prior elevated HCT.

## 2018-12-12 NOTE — PROGRESS NOTES
12/12/2018  8:59 AM  Case management note    Met with patient and spouse to discuss discharge planning. Confirmed demographics. Patient and spouse live in 2 story home with 4 steps to enter and 14 within. Patient has no equipment, drives and is independent with ADL's. He uses [facility] in [place] for med needs. Patient has advance directive  Patient follows Jayce Schmidt for medical management. NO NN  Wife cell# [phone], son Will Doy Barrier [phone].     Reason for Admission:   Splenic vein thrombosis                   RRAT Score:         10            Plan for utilizing home health:      Not for this admission                    Likelihood of Readmission:  Low/green                         Transition of Care Plan:           Home with family assistance and out patient follow up    Care Management Interventions  PCP Verified by CM: Yes(dr. Britton Khoury no nn)  Mode of Transport at Discharge: Self  Transition of Care Consult (CM Consult): Discharge Planning  Current Support Network: Lives with Spouse  Plan discussed with Pt/Family/Caregiver: Yes  Discharge Location  Discharge Placement: Home with family assistance  [place], [place]

## 2018-12-12 NOTE — CONSULTS
[facility] [place] [facility] [place] [address] [address] [facility]  Carisa Jaime: [phone]  F: [phone]      Reason for Visit:   Orion Sharp is a 82 y.o. male who is seen in consultation at the request of   for evaluation of splenic vein thrombosis. Hematology / Oncology Treatment History:   none      History of Present Illness:     Mr David Oleary was admitted on 12/12/2018 from the ED when he presented with c/o LUQ pain x 3-4 weeks worsened with coughing. Seen by PCP; rx a course of abx for diverticulitis; pain initially improved but then worsened. Reports 10 # weight loss. ED CT scan suggests splenic infarcts and splenic vein thrombosis. Ultrasound unrevealing. Mr David Oleary reports pain to LUQ area that radiated to left chest area upon admission; associated with nonproductive cough. Chest pain has been relieved but LUQ pain continues. Denies signs of bleeding including hematemesis, melena. Last colonoscopy in 2011; has never had an EGD. Hx of DVT 30-40 yrs ago in left LE; noticed swelling to calf area; took shots for awhile. No additional clots. Denies recent trauma to abd area but did have bilateral inguinal hernia repair in Jan 2018; haven't felt well since surgery was done. Denies ETOH use  Denies smoking hx. Past Medical History:   Diagnosis Date    Benign hypertension     Chest pain, unspecified     Dyspnea     Hypertension     LBBB (left bundle branch block)     Palpitations       Past Surgical History:   Procedure Laterality Date    HX TONSIL AND ADENOIDECTOMY        Social History     Tobacco Use    Smoking status: Never Smoker   Substance Use Topics    Alcohol use: No      No family history on file. No current facility-administered medications for this encounter. Current Outpatient Medications   Medication Sig    losartan-hydroCHLOROthiazide (HYZAAR) 50-12.5 mg per tablet Take 1 Tab by mouth daily.     J CARLOS PO Take 1 Tab by mouth daily.    magnesium oxide (MAG-OX) 400 mg tablet Take 400 mg by mouth daily.  cholecalciferol (VITAMIN D3) 1,000 unit cap Take 1,000 Units by mouth daily.  cyanocobalamin 1,000 mcg tablet Take 1,000 mcg by mouth daily.  ascorbic acid, vitamin C, (VITAMIN C) 500 mg tablet Take 500 mg by mouth daily.  acetaminophen (TYLENOL) 325 mg tablet Take 2 Tabs by mouth every six (6) hours as needed.  apixaban (ELIQUIS) 5 mg tablet Take 5 mg by mouth two (2) times a day.  apixaban (ELIQUIS) 5 mg tablet Take 5 mg by mouth two (2) times a day. Allergies   Allergen Reactions    Beta Blocker [Beta-Blockers (Beta-Adrenergic Blocking Agts)] Other (comments)     \"It gives me a heart attack\", per pt med causes chest to swell and chest pain.  Pcn [Penicillins] Unknown (comments)        Review of Systems: A complete review of systems was obtained, negative except as described above. Physical Exam:     Visit Vitals  /73 (BP 1 Location: Right arm, BP Patient Position: At rest)   Pulse 67   Temp 98.7 °F (37.1 °C)   Resp 19   Ht 6' (1.829 m)   Wt 200 lb (90.7 kg)   SpO2 94%   BMI 27.12 kg/m²     ECOG PS: 2  General: No distress  Eyes: PERRLA, anicteric sclerae  HENT: Atraumatic with normal appearance of ears and nose; OP clear  Neck: Supple; no thyromegaly   Lymphatic: No cervical, supraclavicular, or axillary adenopathy  Respiratory: CTAB, normal respiratory effort  CV: Normal rate, regular rhythm, no murmurs, no peripheral edema  GI: Soft, nontender, nondistended, no masses, no hepatomegaly, no splenomegaly  MS: no clubbing or cyanosis noted to digits  Skin: No rashes, ecchymoses, or petechiae. Normal temperature, turgor, and texture.   Neuro/Psych: Alert, oriented, appropriate affect, normal judgment/insight      Results:     Lab Results   Component Value Date/Time    WBC 9.9 12/11/2018 10:58 PM    HGB 15.3 12/11/2018 10:58 PM    HCT 45.5 12/11/2018 10:58 PM    PLATELET 592 39/18/3144 10:58 PM    MCV 95.6 12/11/2018 10:58 PM    ABS. NEUTROPHILS 6.0 12/11/2018 10:58 PM     Lab Results   Component Value Date/Time    Sodium 140 12/11/2018 10:58 PM    Potassium 3.9 12/11/2018 10:58 PM    Chloride 100 12/11/2018 10:58 PM    CO2 31 12/11/2018 10:58 PM    Glucose 103 (H) 12/11/2018 10:58 PM    BUN 14 12/11/2018 10:58 PM    Creatinine 1.18 12/11/2018 10:58 PM    GFR est AA >60 12/11/2018 10:58 PM    GFR est non-AA 59 (L) 12/11/2018 10:58 PM    Calcium 8.8 12/11/2018 10:58 PM    Creatinine (POC) 1.4 (H) 12/31/2011 03:04 PM     Lab Results   Component Value Date/Time    Bilirubin, total 0.6 12/11/2018 10:58 PM    ALT (SGPT) 20 12/11/2018 10:58 PM    AST (SGOT) 21 12/11/2018 10:58 PM    Alk. phosphatase 103 12/11/2018 10:58 PM    Protein, total 6.8 12/11/2018 10:58 PM    Albumin 3.5 12/11/2018 10:58 PM    Globulin 3.3 12/11/2018 10:58 PM     Lab Results   Component Value Date/Time    Lipase 248 03/12/2013 04:50 AM     Lab Results   Component Value Date/Time    INR 1.1 12/11/2018 10:58 PM    aPTT 26.3 12/11/2018 10:58 PM    D-dimer 1.92 (H) 12/11/2018 10:58 PM     12/11/2018 XR CHEST  IMPRESSION: No acute cardiopulmonary disease. 12/11/2018 CT ABD PELV W CONT  IMPRESSION:   1. Geographic areas of low attenuation in the spleen suggesting infarcts. There  is low attenuation in the splenic vein relative to the superior mesenteric vein  and portal vein suggesting splenic vein thrombosis. These findings are of  indeterminate acuity. They may represent sequelae of prior acute pancreatitis,  although there are no findings to suggest acute pancreatitis on the current  study.     2. Hepatic steatosis.      3. Cholelithiasis. 12/12/2018 US ABD  IMPRESSION:  No ultrasound evidence of portal vein thrombosis patient with known  splenic infarction splenic vein thrombosis. Cholecystolithiasis. There are areas  of obscured evaluation as above. Assessment and Recommendations: 1. Splenic infarct/splenic vein thrombosis  Age indeterminate; no clear predisposing factors. Thrombophilia labs are pending. I have added JAK2 as well, given his prior elevated HCT. Given his recent symptoms, I favor treating this as an acute event, with 6 months of full dose anticoagulation. Start apixaban 10 mg po bid x 7 days and then 5 mg po bid.    --follow up established with the clinic; reviewed with pt and placed in discharge summary. --Eliquis Rx called in to pharmacy; medication not in stock; medication will be delivered in am; Eliquis dose given prior to discharge. --NP discussed with hospitalist    2. Elevated Hgb   Po-2 and epo ordered      3. Elevated troponin  Cardio consulted and following: serial troponin; ischemia workup as outpatient  ECHO: pending      Patient seen in conjunction with Gema Raygoza NP.       Signed By: Roopa Bueno MD

## 2018-12-12 NOTE — PHYSICIAN ADVISORY
Short Stay Review       Pt Name:  Dasia Ni   MR#  [id number]   CSN#   [id number]   Room and Hospital  ER20/20  @ [address] [place] [address]   Hospitalization date  12/11/2018 10:19 PM  No discharge date for patient encounter. Current Attending Physician  Salazar Fischer MD     A discharge order has been placed for this episode of hospital care for Mr. Dsaia Ni; since this hospital stay is less than two midnights, I reviewed Mr. Tanna Crouch chart. Mr. Tanna Crouch healthcare insurance/benefit include:  Payor: [place] MED[place]ARE / Plan: VA MEDICARE PART A & B / Product Type: Medicare /     Utilization Review related case summary:   Age  82 y.o.   BMI  Body mass index is 27.12 kg/m². PMHx includes  BPH, LBBB, palpitation, HTN    Hospital course  The pt developed acute splenic vein thrombosis. Plan for OP follow up is noted, while he is switched to PO anticoagulation.     Risk of deterioration at the time this patient  was hospitalized     Moderate          On the basis of chart review, this patient's hospitalization status      is appropriate for Paulette Forbes MD MPH FACP   Cell : [phone]  Physician [address] [place] [address]  [address] [place] [address]   Utilization Review, Care Management         CSN:  [id number]   DAMARI:   [id number]  Admitted on :  12/11/2018   Discharge order

## 2018-12-12 NOTE — ED TRIAGE NOTES
Pt. States had diverticulitis about 1 month ago, states that has pain in abd. States also has pain in chest with cough and neck pain/soreness. Pt. Niko Keller immediately for EKG.

## 2018-12-12 NOTE — ROUTINE PROCESS
I have reviewed discharge instructions with the patient and spouse. The patient and spouse verbalized understanding. Discharge medications reviewed with patient and spouse and appropriate educational materials and side effects teaching were provided. RX sent to pharmacy per Violeta Blake NP. Signed copy of d/c instructions placed in pt's chart. Pt aware to take Eliquis 5 mg 2 tabs twice a daily x 7 days and then 5 mg 1 tab twice daily x 6 months. Added this to d/c instructions.

## 2018-12-12 NOTE — ED PROVIDER NOTES
82 y.o. male with past medical history significant for LBBB and HTN who presents from home with chief complaint of chest pain. Patient complains of left sided chest pain \"like angina\" with onset around noon today. The pain has been persistent since but has varied in severity. Patient reports exacerbation of pain with coughing. Patient also complains of left sided neck pain. Patient additionally complains of abdominal pain, noting that he had diverticulitis about 1.5 months ago and now he thinks it is flaring up again. Patient denies any leg pain, leg swelling, fever, nausea, diarrhea, or history of heart attack. There are no other acute medical concerns at this time. Social hx: Denies tobacco, EtOH, or illicit drugs. PCP: Amanda Ortez MD    Note written by Alpesh Kee, as dictated by Kaiden Benito MD 10:22 PM        The history is provided by the patient. No  was used. Past Medical History:   Diagnosis Date    Benign hypertension     Chest pain, unspecified     Dyspnea     Hypertension     LBBB (left bundle branch block)     Palpitations        Past Surgical History:   Procedure Laterality Date    HX TONSIL AND ADENOIDECTOMY           No family history on file.     Social History     Socioeconomic History    Marital status:      Spouse name: Not on file    Number of children: Not on file    Years of education: Not on file    Highest education level: Not on file   Social Needs    Financial resource strain: Not on file    Food insecurity - worry: Not on file    Food insecurity - inability: Not on file    Transportation needs - medical: Not on file   [place] Medical needs - non-medical: Not on file   Occupational History    Not on file   Tobacco Use    Smoking status: Never Smoker   Substance and Sexual Activity    Alcohol use: No    Drug use: Not on file    Sexual activity: Not on file   Other Topics Concern    Not on file   Social History Narrative    Not on file         ALLERGIES: Beta blocker [beta-blockers (beta-adrenergic blocking agts)] and Pcn [penicillins]    Review of Systems   Constitutional: Negative for fever. Eyes: Negative for visual disturbance. Respiratory: Positive for cough. Negative for shortness of breath and wheezing. Cardiovascular: Positive for chest pain. Negative for leg swelling. Gastrointestinal: Positive for abdominal pain. Negative for diarrhea, nausea and vomiting. Genitourinary: Negative for dysuria. Musculoskeletal: Positive for neck pain. Negative for back pain and neck stiffness. Skin: Negative for rash. Neurological: Negative. Negative for syncope and headaches. Psychiatric/Behavioral: Negative for confusion. All other systems reviewed and are negative. There were no vitals filed for this visit. Physical Exam   Constitutional: He is oriented to person, place, and time. He appears well-developed and well-nourished. No distress. HENT:   Head: Normocephalic and atraumatic. Eyes: Pupils are equal, round, and reactive to light. Neck: Normal range of motion. Neck supple. Cardiovascular: Normal rate, regular rhythm and normal heart sounds. Exam reveals no gallop and no friction rub. No murmur heard. Pulmonary/Chest: Effort normal and breath sounds normal. No respiratory distress. He has no wheezes. Abdominal: Soft. Bowel sounds are normal. He exhibits no distension. There is tenderness in the left upper quadrant. There is no rebound and no guarding. Musculoskeletal: Normal range of motion. Neurological: He is alert and oriented to person, place, and time. Skin: Skin is warm. No rash noted. He is not diaphoretic. Psychiatric: He has a normal mood and affect. His behavior is normal. Judgment and thought content normal.   Nursing note and vitals reviewed.        MDM     This is an 61-year-old male with past medical history, review of systems complete physical exam as above, presenting with complaints of left upper quadrant abdominal pain, chest pain. Patient states abdominal pain, worsening since been treated for diverticulitis by his primary care physician, endorses an episode of anginal-like chest pain approximately noon today, persistent, but decreasing in its intensity. Physical exam remarkable for well-appearing elderly male, with mild left upper quadrant tenderness to palpation, clear breath sounds, otherwise benign abdomen. Differential includes recurrent diverticular disease, acute coronary syndrome, chest wall pain, pneumonia. Plan to obtain CMP, CBC, EKG, d-dimer, cardiac enzymes, CT scan of the abdomen and pelvis. We will reassess, and make a disposition based the patient's diagnostics and response to therapy. Procedures    CONSULT NOTE:  11:52 PM Tali Sawyer MD spoke with Dr. Heywood Homans, Consult for Hospitalist.  Discussed available diagnostic tests and clinical findings. Dr. Heywood Homans will see and admit the patient.

## 2018-12-12 NOTE — ED NOTES
Bedside and verbal report given to Ángela RN, RN on Gera Philip at shift change for routine progression of care. Report consisted of patients Situation, Background, Assessment and Recommendations(SBAR). Information from the following report(s) SBAR, ED Summary, [facility] and Recent Results was reviewed with the receiving nurse. Opportunity for questions and clarification was provided.

## 2018-12-13 LAB
AFP-TM SERPL-MCNC: 2.9 NG/ML (ref 0–8.3)
EPO SERPL-ACNC: 22.9 MIU/ML (ref 2.6–18.5)

## 2018-12-14 LAB
APCR PPP: 2.6 RATIO (ref 2.2–3.5)
AT III PPP CHRO-ACNC: 88 % (ref 75–135)
CARDIOLIPIN IGA SER IA-ACNC: <9 APL U/ML (ref 0–11)
CARDIOLIPIN IGG SER IA-ACNC: <9 GPL U/ML (ref 0–14)
CARDIOLIPIN IGM SER IA-ACNC: 11 MPL U/ML (ref 0–12)
INTERPRETATION, 117893: NORMAL
PROT C AG PPP IA-ACNC: 90 % (ref 60–150)
PROT C PPP-ACNC: 113 % (ref 73–180)
PROT S ACT/NOR PPP: 61 % (ref 63–140)
PROT S ACT/NOR PPP: 86 % (ref 57–157)
PROT S PPP-ACNC: 70 % (ref 60–150)
SCREEN APTT: 38.4 SEC (ref 0–51.9)
SCREEN DRVVT: 39.4 SEC (ref 0–47)

## 2018-12-15 LAB
B2 GLYCOPROT1 IGA SER-ACNC: <9 GPI IGA UNITS (ref 0–25)
B2 GLYCOPROT1 IGG SER-ACNC: <9 GPI IGG UNITS (ref 0–20)
B2 GLYCOPROT1 IGM SER-ACNC: <9 GPI IGM UNITS (ref 0–32)

## 2018-12-17 LAB
BACKGROUND: 489207: NORMAL
DIRECTOR REVIEW: 489204: NORMAL
F2 GENE MUT ANL BLD/T: NORMAL
F5 GENE MUT ANL BLD/T: NORMAL
JAK2 P.V617F BLD/T QL: NORMAL

## 2018-12-24 ENCOUNTER — HOSPITAL ENCOUNTER (EMERGENCY)
Age: 83
Discharge: HOME | End: 2018-12-24
Attending: EMERGENCY MEDICINE | Admitting: EMERGENCY MEDICINE
Payer: MEDICARE

## 2018-12-24 VITALS
HEART RATE: 62 BPM | DIASTOLIC BLOOD PRESSURE: 83 MMHG | OXYGEN SATURATION: 94 % | SYSTOLIC BLOOD PRESSURE: 158 MMHG | RESPIRATION RATE: 15 BRPM

## 2018-12-24 DIAGNOSIS — I10 HYPERTENSION, UNSPECIFIED TYPE: Primary | ICD-10-CM

## 2018-12-24 LAB
ANION GAP SERPL CALC-SCNC: 11 MMOL/L (ref 5–15)
BASOPHILS # BLD: 0.1 K/UL (ref 0–0.1)
BASOPHILS NFR BLD: 1 % (ref 0–1)
BUN SERPL-MCNC: 17 MG/DL (ref 6–20)
BUN/CREAT SERPL: 15 (ref 12–20)
CALCIUM SERPL-MCNC: 9.2 MG/DL (ref 8.5–10.1)
CHLORIDE SERPL-SCNC: 104 MMOL/L (ref 97–108)
CO2 SERPL-SCNC: 26 MMOL/L (ref 21–32)
COMMENT, HOLDF: NORMAL
CREAT SERPL-MCNC: 1.17 MG/DL (ref 0.7–1.3)
DIFFERENTIAL METHOD BLD: ABNORMAL
EOSINOPHIL # BLD: 0.2 K/UL (ref 0–0.4)
EOSINOPHIL NFR BLD: 2 % (ref 0–7)
ERYTHROCYTE [DISTWIDTH] IN BLOOD BY AUTOMATED COUNT: 12.7 % (ref 11.5–14.5)
GLUCOSE SERPL-MCNC: 107 MG/DL (ref 65–100)
HCT VFR BLD AUTO: 45.5 % (ref 36.6–50.3)
HGB BLD-MCNC: 15.3 G/DL (ref 12.1–17)
IMM GRANULOCYTES # BLD: 0.1 K/UL (ref 0–0.04)
IMM GRANULOCYTES NFR BLD AUTO: 1 % (ref 0–0.5)
LYMPHOCYTES # BLD: 3.1 K/UL (ref 0.8–3.5)
LYMPHOCYTES NFR BLD: 39 % (ref 12–49)
MAGNESIUM SERPL-MCNC: 2.1 MG/DL (ref 1.6–2.4)
MCH RBC QN AUTO: 32.2 PG (ref 26–34)
MCHC RBC AUTO-ENTMCNC: 33.6 G/DL (ref 30–36.5)
MCV RBC AUTO: 95.8 FL (ref 80–99)
MONOCYTES # BLD: 0.6 K/UL (ref 0–1)
MONOCYTES NFR BLD: 8 % (ref 5–13)
NEUTS SEG # BLD: 3.9 K/UL (ref 1.8–8)
NEUTS SEG NFR BLD: 49 % (ref 32–75)
NRBC # BLD: 0 K/UL (ref 0–0.01)
NRBC BLD-RTO: 0 PER 100 WBC
PLATELET # BLD AUTO: 236 K/UL (ref 150–400)
PMV BLD AUTO: 9.1 FL (ref 8.9–12.9)
POTASSIUM SERPL-SCNC: 3.8 MMOL/L (ref 3.5–5.1)
RBC # BLD AUTO: 4.75 M/UL (ref 4.1–5.7)
SAMPLES BEING HELD,HOLD: NORMAL
SODIUM SERPL-SCNC: 141 MMOL/L (ref 136–145)
WBC # BLD AUTO: 7.9 K/UL (ref 4.1–11.1)

## 2018-12-24 PROCEDURE — 99283 EMERGENCY DEPT VISIT LOW MDM: CPT

## 2018-12-24 PROCEDURE — 36415 COLL VENOUS BLD VENIPUNCTURE: CPT

## 2018-12-24 PROCEDURE — 83735 ASSAY OF MAGNESIUM: CPT

## 2018-12-24 PROCEDURE — 85025 COMPLETE CBC W/AUTO DIFF WBC: CPT

## 2018-12-24 PROCEDURE — 80048 BASIC METABOLIC PNL TOTAL CA: CPT

## 2018-12-24 PROCEDURE — 93005 ELECTROCARDIOGRAM TRACING: CPT

## 2018-12-24 RX ORDER — HYDRALAZINE HYDROCHLORIDE 25 MG/1
25 TABLET, FILM COATED ORAL 3 TIMES DAILY
Qty: 30 TAB | Refills: 0 | Status: SHIPPED | OUTPATIENT
Start: 2018-12-24 | End: 2019-07-03

## 2018-12-24 RX ORDER — SODIUM CHLORIDE 0.9 % (FLUSH) 0.9 %
5-10 SYRINGE (ML) INJECTION EVERY 8 HOURS
Status: DISCONTINUED | OUTPATIENT
Start: 2018-12-24 | End: 2018-12-24 | Stop reason: HOSPADM

## 2018-12-24 RX ORDER — SODIUM CHLORIDE 0.9 % (FLUSH) 0.9 %
5-10 SYRINGE (ML) INJECTION AS NEEDED
Status: DISCONTINUED | OUTPATIENT
Start: 2018-12-24 | End: 2018-12-24 | Stop reason: HOSPADM

## 2018-12-24 NOTE — DISCHARGE INSTRUCTIONS
We hope that we have addressed all of your medical concerns. The examination and treatment you received in the Emergency Department were for an emergent problem and were not intended as complete care. It is important that you follow up with your healthcare provider(s) for ongoing care. If your symptoms worsen or do not improve as expected, and you are unable to reach your usual health care provider(s), you should return to the Emergency Department. Today's healthcare is undergoing tremendous change, and patient satisfaction surveys are one of the many tools to assess the quality of medical care. You may receive a survey from the [employer] regarding your experience in the Emergency Department. I hope that your experience has been completely positive, particularly the medical care that I provided. As such, please participate in the survey; anything less than excellent does not meet my expectations or intentions. [facility] and [facility] participate in nationally recognized quality of care measures. If your blood pressure is greater than 120/80, as reported below, we urge that you seek medical care to address the potential of high blood pressure, commonly known as hypertension. Hypertension can be hereditary or can be caused by certain medical conditions, pain, stress, or \"white coat syndrome. \"       Please make an appointment with your health care provider(s) for follow up of your Emergency Department visit. VITALS:   Patient Vitals for the past 8 hrs:   Pulse Resp BP SpO2   12/24/18 0130 62 15 158/83 94 %   12/24/18 0115 (!) 59 14 (!) 156/92 94 %          Thank you for allowing us to provide you with medical care today. We realize that you have many choices for your emergency care needs. Please choose us in the future for any continued health care needs. Torsten Felix [facility] [place] [facility] [address] [facility].   Office: [phone]            Recent Results (from the past 24 hour(s))   CBC WITH AUTOMATED DIFF    Collection Time: 12/24/18  1:16 AM   Result Value Ref Range    WBC 7.9 4.1 - 11.1 K/uL    RBC 4.75 4.10 - 5.70 M/uL    HGB 15.3 12.1 - 17.0 g/dL    HCT 45.5 36.6 - 50.3 %    MCV 95.8 80.0 - 99.0 FL    MCH 32.2 26.0 - 34.0 PG    MCHC 33.6 30.0 - 36.5 g/dL    RDW 12.7 11.5 - 14.5 %    PLATELET 340 317 - 027 K/uL    MPV 9.1 8.9 - 12.9 FL    NRBC 0.0 0  WBC    ABSOLUTE NRBC 0.00 0.00 - 0.01 K/uL    NEUTROPHILS 49 32 - 75 %    LYMPHOCYTES 39 12 - 49 %    MONOCYTES 8 5 - 13 %    EOSINOPHILS 2 0 - 7 %    BASOPHILS 1 0 - 1 %    IMMATURE GRANULOCYTES 1 (H) 0.0 - 0.5 %    ABS. NEUTROPHILS 3.9 1.8 - 8.0 K/UL    ABS. LYMPHOCYTES 3.1 0.8 - 3.5 K/UL    ABS. MONOCYTES 0.6 0.0 - 1.0 K/UL    ABS. EOSINOPHILS 0.2 0.0 - 0.4 K/UL    ABS. BASOPHILS 0.1 0.0 - 0.1 K/UL    ABS. IMM. GRANS. 0.1 (H) 0.00 - 0.04 K/UL    DF AUTOMATED     METABOLIC PANEL, BASIC    Collection Time: 12/24/18  1:16 AM   Result Value Ref Range    Sodium 141 136 - 145 mmol/L    Potassium 3.8 3.5 - 5.1 mmol/L    Chloride 104 97 - 108 mmol/L    CO2 26 21 - 32 mmol/L    Anion gap 11 5 - 15 mmol/L    Glucose 107 (H) 65 - 100 mg/dL    BUN 17 6 - 20 MG/DL    Creatinine 1.17 0.70 - 1.30 MG/DL    BUN/Creatinine ratio 15 12 - 20      GFR est AA >60 >60 ml/min/1.73m2    GFR est non-AA 60 (L) >60 ml/min/1.73m2    Calcium 9.2 8.5 - 10.1 MG/DL   MAGNESIUM    Collection Time: 12/24/18  1:16 AM   Result Value Ref Range    Magnesium 2.1 1.6 - 2.4 mg/dL   SAMPLES BEING HELD    Collection Time: 12/24/18  1:16 AM   Result Value Ref Range    SAMPLES BEING HELD SST,RD,LENY     COMMENT        Add-on orders for these samples will be processed based on acceptable specimen integrity and analyte stability, which may vary by analyte. No results found.            High Blood Pressure: Care Instructions  Your Care Instructions    If your blood pressure is usually above 130/80, you have high blood pressure, or hypertension. That means the top number is 130 or higher or the bottom number is 80 or higher, or both. Despite what a lot of people think, high blood pressure usually doesn't cause headaches or make you feel dizzy or lightheaded. It usually has no symptoms. But it does increase your risk for heart attack, stroke, and kidney or eye damage. The higher your blood pressure, the more your risk increases. Your doctor will give you a goal for your blood pressure. Your goal will be based on your health and your age. Lifestyle changes, such as eating healthy and being active, are always important to help lower blood pressure. You might also take medicine to reach your blood pressure goal.  Follow-up care is a key part of your treatment and safety. Be sure to make and go to all appointments, and call your doctor if you are having problems. It's also a good idea to know your test results and keep a list of the medicines you take. How can you care for yourself at home? Medical treatment  · If you stop taking your medicine, your blood pressure will go back up. You may take one or more types of medicine to lower your blood pressure. Be safe with medicines. Take your medicine exactly as prescribed. Call your doctor if you think you are having a problem with your medicine. · Talk to your doctor before you start taking aspirin every day. Aspirin can help certain people lower their risk of a heart attack or stroke. But taking aspirin isn't right for everyone, because it can cause serious bleeding. · See your doctor regularly. You may need to see the doctor more often at first or until your blood pressure comes down. · If you are taking blood pressure medicine, talk to your doctor before you take decongestants or anti-inflammatory medicine, such as ibuprofen. Some of these medicines can raise blood pressure. · Learn how to check your blood pressure at home.   Lifestyle changes  · Stay at a healthy weight. This is especially important if you put on weight around the waist. Losing even 10 pounds can help you lower your blood pressure. · If your doctor recommends it, get more exercise. Walking is a good choice. Bit by bit, increase the amount you walk every day. Try for at least 30 minutes on most days of the week. You also may want to swim, bike, or do other activities. · Avoid or limit alcohol. Talk to your doctor about whether you can drink any alcohol. · Try to limit how much sodium you eat to less than 2,300 milligrams (mg) a day. Your doctor may ask you to try to eat less than 1,500 mg a day. · Eat plenty of fruits (such as bananas and oranges), vegetables, legumes, whole grains, and low-fat dairy products. · Lower the amount of saturated fat in your diet. Saturated fat is found in animal products such as milk, cheese, and meat. Limiting these foods may help you lose weight and also lower your risk for heart disease. · Do not smoke. Smoking increases your risk for heart attack and stroke. If you need help quitting, talk to your doctor about stop-smoking programs and medicines. These can increase your chances of quitting for good. When should you call for help? Call 911 anytime you think you may need emergency care. This may mean having symptoms that suggest that your blood pressure is causing a serious heart or blood vessel problem. Your blood pressure may be over 180/120.   For example, call 911 if:    · You have symptoms of a heart attack. These may include:  ? Chest pain or pressure, or a strange feeling in the chest.  ? Sweating. ? Shortness of breath. ? Nausea or vomiting. ? Pain, pressure, or a strange feeling in the back, neck, jaw, or upper belly or in one or both shoulders or arms. ? Lightheadedness or sudden weakness. ? A fast or irregular heartbeat.     · You have symptoms of a stroke.  These may include:  ? Sudden numbness, tingling, weakness, or loss of movement in your face, arm, or leg, especially on only one side of your body. ? Sudden vision changes. ? Sudden trouble speaking. ? Sudden confusion or trouble understanding simple statements. ? Sudden problems with walking or balance. ? A sudden, severe headache that is different from past headaches.     · You have severe back or belly pain.    Do not wait until your blood pressure comes down on its own. Get help right away.   Call your doctor now or seek immediate care if:    · Your blood pressure is much higher than normal (such as 180/120 or higher), but you don't have symptoms.     · You think high blood pressure is causing symptoms, such as:  ? Severe headache.  ? Blurry vision.    Watch closely for changes in your health, and be sure to contact your doctor if:    · Your blood pressure measures higher than your doctor recommends at least 2 times. That means the top number is higher or the bottom number is higher, or both.     · You think you may be having side effects from your blood pressure medicine. Where can you learn more? Go to http://sandeep-danie.info/. Enter D647 in the search box to learn more about \"High Blood Pressure: Care Instructions. \"  Current as of: December 6, 2017  Content Version: 11.8  © 4928-1965 [employer]. Care instructions adapted under license by [employer] (which disclaims liability or warranty for this information). If you have questions about a medical condition or this instruction, always ask your healthcare professional. [place] [address] any warranty or liability for your use of this information. Home Blood Pressure Test: About This Test  What is it? A home blood pressure test allows you to keep track of your blood pressure at home. Blood pressure is a measure of the force of blood against the walls of your arteries. Blood pressure readings include two numbers, such as 130/80 (say \"130 over 80\"). The first number is the systolic pressure. The second number is the diastolic pressure. Why is this test done? You may do this test at home to:  · Find out if you have high blood pressure. · Track your blood pressure if you have high blood pressure. · Track how well medicine is working to reduce high blood pressure. · Check how lifestyle changes, such as weight loss and exercise, are affecting blood pressure. How can you prepare for the test?  · Do not use caffeine, tobacco, or medicines known to raise blood pressure (such as nasal decongestant sprays) for at least 30 minutes before taking your blood pressure. · Do not exercise for at least 30 minutes before taking your blood pressure. What happens before the test?  Take your blood pressure while you feel comfortable and relaxed. Sit quietly with both feet on the floor for at least 5 minutes before the test.  What happens during the test?  · Sit with your arm slightly bent and resting on a table so that your upper arm is at the same level as your heart. · Roll up your sleeve or take off your shirt to expose your upper arm. · Wrap the blood pressure cuff around your upper arm so that the lower edge of the cuff is about 1 inch above the bend of your elbow. Proceed with the following steps depending on if you are using an automatic or manual pressure monitor. Automatic blood pressure monitors  · Press the on/off button on the automatic monitor and wait until the ready-to-measure \"heart\" symbol appears next to zero in the display window. · Press the start button. The cuff will inflate and deflate by itself. · Your blood pressure numbers will appear on the screen. · Write your numbers in your log book, along with the date and time. Manual blood pressure monitors  · Place the earpieces of a stethoscope in your ears, and place the bell of the stethoscope over the artery, just below the cuff. · Close the valve on the rubber inflating bulb.   · Squeeze the bulb rapidly with your opposite hand to inflate the cuff until the dial or column of mercury reads about 30 mm Hg higher than your usual systolic pressure. If you do not know your usual pressure, inflate the cuff to 210 mm Hg or until the pulse at your wrist disappears. · Open the pressure valve just slightly by twisting or pressing the valve on the bulb. · As you watch the pressure slowly fall, note the level on the dial at which you first start to hear a pulsing or tapping sound through the stethoscope. This is your systolic blood pressure. · Continue letting the air out slowly. The sounds will become muffled and will finally disappear. Note the pressure when the sounds completely disappear. This is your diastolic blood pressure. Let out all the remaining air. · Write your numbers in your log book, along with the date and time. What else should you know about the test?  Here are the categories of blood pressure for adults:  Ideal blood pressure. Systolic is less than 546, and diastolic is less than 80. Elevated blood pressure. Systolic is 516 to 624, and diastolic is less than 80. High blood pressure (hypertension). Systolic is 104 or above. Diastolic is 80 or above. One or both numbers may be high. It is more accurate to take the average of several readings made throughout the day than to rely on a single reading. Follow-up care is a key part of your treatment and safety. Be sure to make and go to all appointments, and call your doctor if you are having problems. It's also a good idea to keep a list of the medicines you take. Where can you learn more? Go to http://sandeep-danie.info/. Enter C427 in the search box to learn more about \"Home Blood Pressure Test: About This Test.\"  Current as of: December 6, 2017  Content Version: 11.8  © 6320-8813 [email or web address].  Care instructions adapted under license by [employer] (which disclaims liability or warranty for this information). If you have questions about a medical condition or this instruction, always ask your healthcare professional. [place] [address] any warranty or liability for your use of this information.

## 2018-12-24 NOTE — ED TRIAGE NOTES
Pt here with elevated BP. Pt reports as high as 205/115. Pt reports that he took extra does of losartan/HCTZ. \"Blood pressure in triage done to 171/82. Pt does state that he has had increased salt intake due to the holiday.

## 2018-12-24 NOTE — ED PROVIDER NOTES
82 y.o. male with past medical history significant for HTN and blood clot in spleen presents accompanied by wife with chief complaint of elevated BP at 205/115 that he checked today. Pt explains that he was recently diagnosed with a blood clot in his spleen 6 days ago, and was put on blood thinners. He further explains that he has since been monitoring his BP, noting that he has been \"feeling funny. \" Pt indicates that he took an extra dose of Losartan/HCTZ approximately 1 hour PTA. Pt adds that he his BP is usually 255 systolic. He also adds that he still has occasional abd pain when coughing. Pt denies any chest pain currently. There are no other acute medical concerns at this time. Social hx: Patient denies Tobacco use. Denies EtOH use. Denies illicit drug abuse. PCP: Larry Meza MD    Note written by Oumou Marmolejo, as dictated by Annmarie Jacobsen, DO 1:21 AM        The history is provided by the patient. No  was used. Past Medical History:   Diagnosis Date    Benign hypertension     Chest pain, unspecified     Dyspnea     Hypertension     LBBB (left bundle branch block)     Palpitations        Past Surgical History:   Procedure Laterality Date    HX TONSIL AND ADENOIDECTOMY           No family history on file.     Social History     Socioeconomic History    Marital status:      Spouse name: Not on file    Number of children: Not on file    Years of education: Not on file    Highest education level: Not on file   Social Needs    Financial resource strain: Not on file    Food insecurity - worry: Not on file    Food insecurity - inability: Not on file    Transportation needs - medical: Not on file   [employer] needs - non-medical: Not on file   Occupational History    Not on file   Tobacco Use    Smoking status: Never Smoker   Substance and Sexual Activity    Alcohol use: No    Drug use: Not on file    Sexual activity: Not on file Other Topics Concern    Not on file   Social History Narrative    Not on file     ALLERGIES: Beta blocker [beta-blockers (beta-adrenergic blocking agts)] and Pcn [penicillins]    Review of Systems   Constitutional: Negative for appetite change, chills, fever and unexpected weight change. HENT: Negative for ear pain, hearing loss, rhinorrhea and trouble swallowing. Eyes: Negative for pain and visual disturbance. Respiratory: Negative for cough, chest tightness and shortness of breath. Cardiovascular: Negative for chest pain and palpitations. Gastrointestinal: Positive for abdominal pain (secondary to coughing). Negative for abdominal distention, blood in stool and vomiting. Genitourinary: Negative for dysuria, hematuria and urgency. Musculoskeletal: Negative for back pain and myalgias. Skin: Negative for rash. Neurological: Negative for dizziness, syncope, weakness and numbness. Psychiatric/Behavioral: Negative for confusion and suicidal ideas. All other systems reviewed and are negative. Vitals:    12/24/18 0115 12/24/18 0130   BP: (!) 156/92 158/83   Pulse: (!) 59 62   Resp: 14 15   SpO2: 94% 94%            Physical Exam   Constitutional: He is oriented to person, place, and time. He appears well-developed and well-nourished. No distress. HENT:   Head: Normocephalic and atraumatic. Right Ear: External ear normal.   Left Ear: External ear normal.   Nose: Nose normal.   Mouth/Throat: Oropharynx is clear and moist. No oropharyngeal exudate. Eyes: Conjunctivae and EOM are normal. Pupils are equal, round, and reactive to light. Right eye exhibits no discharge. Left eye exhibits no discharge. No scleral icterus. Neck: Normal range of motion. Neck supple. No JVD present. No tracheal deviation present. Cardiovascular: Normal rate, regular rhythm, normal heart sounds and intact distal pulses. Exam reveals no gallop and no friction rub. No murmur heard.   Pulmonary/Chest: Effort normal and breath sounds normal. No stridor. No respiratory distress. He has no decreased breath sounds. He has no wheezes. He has no rhonchi. He has no rales. He exhibits no tenderness. Abdominal: Soft. Bowel sounds are normal. He exhibits no distension. There is no tenderness. There is no rebound and no guarding. Musculoskeletal: Normal range of motion. He exhibits no edema or tenderness. Neurological: He is alert and oriented to person, place, and time. He has normal strength and normal reflexes. He displays normal reflexes. No cranial nerve deficit or sensory deficit. He exhibits normal muscle tone. Coordination normal. GCS eye subscore is 4. GCS verbal subscore is 5. GCS motor subscore is 6. Skin: Skin is warm and dry. Capillary refill takes less than 2 seconds. No rash noted. He is not diaphoretic. No erythema. No pallor. Psychiatric: He has a normal mood and affect. His behavior is normal. Judgment and thought content normal.   Nursing note and vitals reviewed. Note written by Zee Rodriguez, as dictated by Starr Beard, DO 1:21 AM     MDM  Number of Diagnoses or Management Options  Hypertension, unspecified type:      Amount and/or Complexity of Data Reviewed  Clinical lab tests: ordered and reviewed  Tests in the medicine section of CPT®: ordered and reviewed  Independent visualization of images, tracings, or specimens: yes (ekg)    Risk of Complications, Morbidity, and/or Mortality  Presenting problems: moderate  Diagnostic procedures: low  Management options: moderate    Patient Progress  Patient progress: stable         Procedures    Chief Complaint   Patient presents with    Hypertension       The patient's presenting problems have been discussed, and they are in agreement with the care plan formulated and outlined with them. I have encouraged them to ask questions as they arise throughout their visit.     MEDICATIONS GIVEN:  Medications   sodium chloride (NS) flush 5-10 mL (not administered)   sodium chloride (NS) flush 5-10 mL (not administered)       LABS REVIEWED:  Recent Results (from the past 24 hour(s))   EKG, 12 LEAD, INITIAL    Collection Time: 12/24/18  1:10 AM   Result Value Ref Range    Ventricular Rate 58 BPM    Atrial Rate 58 BPM    P-R Interval 204 ms    QRS Duration 142 ms    Q-T Interval 478 ms    QTC Calculation (Bezet) 469 ms    Calculated P Axis 60 degrees    Calculated R Axis 2 degrees    Calculated T Axis 60 degrees    Diagnosis       ** Poor data quality, interpretation may be adversely affected  Sinus bradycardia  Left bundle branch block  Abnormal ECG  When compared with ECG of 11-DEC-2018 22:23,  Nonspecific T wave abnormality, improved in Lateral leads     CBC WITH AUTOMATED DIFF    Collection Time: 12/24/18  1:16 AM   Result Value Ref Range    WBC 7.9 4.1 - 11.1 K/uL    RBC 4.75 4.10 - 5.70 M/uL    HGB 15.3 12.1 - 17.0 g/dL    HCT 45.5 36.6 - 50.3 %    MCV 95.8 80.0 - 99.0 FL    MCH 32.2 26.0 - 34.0 PG    MCHC 33.6 30.0 - 36.5 g/dL    RDW 12.7 11.5 - 14.5 %    PLATELET 228 637 - 516 K/uL    MPV 9.1 8.9 - 12.9 FL    NRBC 0.0 0  WBC    ABSOLUTE NRBC 0.00 0.00 - 0.01 K/uL    NEUTROPHILS 49 32 - 75 %    LYMPHOCYTES 39 12 - 49 %    MONOCYTES 8 5 - 13 %    EOSINOPHILS 2 0 - 7 %    BASOPHILS 1 0 - 1 %    IMMATURE GRANULOCYTES 1 (H) 0.0 - 0.5 %    ABS. NEUTROPHILS 3.9 1.8 - 8.0 K/UL    ABS. LYMPHOCYTES 3.1 0.8 - 3.5 K/UL    ABS. MONOCYTES 0.6 0.0 - 1.0 K/UL    ABS. EOSINOPHILS 0.2 0.0 - 0.4 K/UL    ABS. BASOPHILS 0.1 0.0 - 0.1 K/UL    ABS. IMM.  GRANS. 0.1 (H) 0.00 - 0.04 K/UL    DF AUTOMATED     METABOLIC PANEL, BASIC    Collection Time: 12/24/18  1:16 AM   Result Value Ref Range    Sodium 141 136 - 145 mmol/L    Potassium 3.8 3.5 - 5.1 mmol/L    Chloride 104 97 - 108 mmol/L    CO2 26 21 - 32 mmol/L    Anion gap 11 5 - 15 mmol/L    Glucose 107 (H) 65 - 100 mg/dL    BUN 17 6 - 20 MG/DL    Creatinine 1.17 0.70 - 1.30 MG/DL    BUN/Creatinine ratio 15 12 - 20 GFR est AA >60 >60 ml/min/1.73m2    GFR est non-AA 60 (L) >60 ml/min/1.73m2    Calcium 9.2 8.5 - 10.1 MG/DL   MAGNESIUM    Collection Time: 12/24/18  1:16 AM   Result Value Ref Range    Magnesium 2.1 1.6 - 2.4 mg/dL   SAMPLES BEING HELD    Collection Time: 12/24/18  1:16 AM   Result Value Ref Range    SAMPLES BEING HELD SST,RD,LENY     COMMENT        Add-on orders for these samples will be processed based on acceptable specimen integrity and analyte stability, which may vary by analyte. VITAL SIGNS:  Patient Vitals for the past 12 hrs:   Pulse Resp BP SpO2   12/24/18 0130 62 15 158/83 94 %   12/24/18 0115 (!) 59 14 (!) 156/92 94 %       RADIOLOGY RESULTS:  The following have been ordered and reviewed:  No results found. ED EKG interpretation:  Rhythm: sinus bradycardia and LBBB; and regular . Rate (approx.): 58; Axis: normal; P wave: normal; QRS interval: normal ; ST/T wave: normal; Other findings: unchanged from previous ekg. This EKG was interpreted by Shelley Galeas DO, ED Provider. PROGRESS NOTES:  2:16 AM  Patient's results have been reviewed with them. Patient and/or family have verbally conveyed their understanding and agreement of the patient's signs, symptoms, diagnosis, treatment and prognosis and additionally agree to follow up as recommended or return to the Emergency Room should their condition change prior to follow-up. Discharge instructions have also been provided to the patient with some educational information regarding their diagnosis as well a list of reasons why they would want to return to the ER prior to their follow-up appointment should their condition change. DIAGNOSIS:    1.  Hypertension, unspecified type        PLAN:  Follow-up Information     Follow up With Specialties Details Why Contact Info    Fito Ramey MD General Practice Schedule an appointment as soon as possible for a visit  [address] [place] [address]  [address] [phone]      [facility] EMERGENCY DEPT Emergency Medicine  If symptoms worsen [address] [facility]  [phone]        Discharge Medication List as of 12/24/2018  2:16 AM      START taking these medications    Details   hydrALAZINE (APRESOLINE) 25 mg tablet Take 1 Tab by mouth three (3) times daily. , Print, Disp-30 Tab, R-0         CONTINUE these medications which have NOT CHANGED    Details   losartan-hydroCHLOROthiazide (HYZAAR) 50-12.5 mg per tablet Take 1 Tab by mouth daily. , Historical Med      J CARLOS PO Take 1 Tab by mouth daily. , Historical Med      magnesium oxide (MAG-OX) 400 mg tablet Take 400 mg by mouth daily. , Historical Med      cholecalciferol (VITAMIN D3) 1,000 unit cap Take 1,000 Units by mouth daily. , Historical Med      cyanocobalamin 1,000 mcg tablet Take 1,000 mcg by mouth daily. , Historical Med      ascorbic acid, vitamin C, (VITAMIN C) 500 mg tablet Take 500 mg by mouth daily. , Historical Med      acetaminophen (TYLENOL) 325 mg tablet Take 2 Tabs by mouth every six (6) hours as needed., OTC      apixaban (ELIQUIS) 5 mg tablet Take 5 mg by mouth two (2) times a day., Historical Med             ED COURSE: The patient's hospital course has been uncomplicated.

## 2018-12-25 LAB
ATRIAL RATE: 58 BPM
CALCULATED P AXIS, ECG09: 60 DEGREES
CALCULATED R AXIS, ECG10: 2 DEGREES
CALCULATED T AXIS, ECG11: 60 DEGREES
DIAGNOSIS, 93000: NORMAL
P-R INTERVAL, ECG05: 204 MS
Q-T INTERVAL, ECG07: 478 MS
QRS DURATION, ECG06: 142 MS
QTC CALCULATION (BEZET), ECG08: 469 MS
VENTRICULAR RATE, ECG03: 58 BPM

## 2018-12-26 ENCOUNTER — APPOINTMENT (OUTPATIENT)
Dept: CT IMAGING | Age: 83
End: 2018-12-26
Attending: EMERGENCY MEDICINE
Payer: MEDICARE

## 2018-12-26 ENCOUNTER — HOSPITAL ENCOUNTER (EMERGENCY)
Age: 83
Discharge: HOME | End: 2018-12-26
Attending: EMERGENCY MEDICINE | Admitting: EMERGENCY MEDICINE
Payer: MEDICARE

## 2018-12-26 VITALS
WEIGHT: 200 LBS | RESPIRATION RATE: 18 BRPM | TEMPERATURE: 98.1 F | DIASTOLIC BLOOD PRESSURE: 90 MMHG | OXYGEN SATURATION: 95 % | HEIGHT: 72 IN | BODY MASS INDEX: 27.09 KG/M2 | HEART RATE: 71 BPM | SYSTOLIC BLOOD PRESSURE: 152 MMHG

## 2018-12-26 DIAGNOSIS — R42 LIGHTHEADED: ICD-10-CM

## 2018-12-26 DIAGNOSIS — I10 HYPERTENSION, UNSPECIFIED TYPE: Primary | ICD-10-CM

## 2018-12-26 LAB
ALBUMIN SERPL-MCNC: 3.6 G/DL (ref 3.5–5)
ALBUMIN/GLOB SERPL: 0.9 {RATIO} (ref 1.1–2.2)
ALP SERPL-CCNC: 120 U/L (ref 45–117)
ALT SERPL-CCNC: 22 U/L (ref 12–78)
ANION GAP SERPL CALC-SCNC: 13 MMOL/L (ref 5–15)
APPEARANCE UR: CLEAR
APTT PPP: 28.4 SEC (ref 22.1–32)
AST SERPL-CCNC: 18 U/L (ref 15–37)
ATRIAL RATE: 58 BPM
ATRIAL RATE: 65 BPM
BACTERIA URNS QL MICRO: NEGATIVE /HPF
BASOPHILS # BLD: 0.1 K/UL (ref 0–0.1)
BASOPHILS NFR BLD: 1 % (ref 0–1)
BILIRUB DIRECT SERPL-MCNC: 0.1 MG/DL (ref 0–0.2)
BILIRUB SERPL-MCNC: 0.4 MG/DL (ref 0.2–1)
BILIRUB UR QL: NEGATIVE
BUN SERPL-MCNC: 18 MG/DL (ref 6–20)
BUN/CREAT SERPL: 16 (ref 12–20)
CALCIUM SERPL-MCNC: 9.3 MG/DL (ref 8.5–10.1)
CALCULATED P AXIS, ECG09: 57 DEGREES
CALCULATED P AXIS, ECG09: 90 DEGREES
CALCULATED R AXIS, ECG10: 10 DEGREES
CALCULATED R AXIS, ECG10: 3 DEGREES
CALCULATED T AXIS, ECG11: 59 DEGREES
CALCULATED T AXIS, ECG11: 87 DEGREES
CHLORIDE SERPL-SCNC: 101 MMOL/L (ref 97–108)
CK SERPL-CCNC: 64 U/L (ref 39–308)
CO2 SERPL-SCNC: 26 MMOL/L (ref 21–32)
COLOR UR: NORMAL
COMMENT, HOLDF: NORMAL
CREAT SERPL-MCNC: 1.15 MG/DL (ref 0.7–1.3)
DIAGNOSIS, 93000: NORMAL
DIAGNOSIS, 93000: NORMAL
DIFFERENTIAL METHOD BLD: NORMAL
EOSINOPHIL # BLD: 0.1 K/UL (ref 0–0.4)
EOSINOPHIL NFR BLD: 2 % (ref 0–7)
EPITH CASTS URNS QL MICRO: NORMAL /LPF
ERYTHROCYTE [DISTWIDTH] IN BLOOD BY AUTOMATED COUNT: 13 % (ref 11.5–14.5)
GLOBULIN SER CALC-MCNC: 3.8 G/DL (ref 2–4)
GLUCOSE SERPL-MCNC: 116 MG/DL (ref 65–100)
GLUCOSE UR STRIP.AUTO-MCNC: NEGATIVE MG/DL
HCT VFR BLD AUTO: 48 % (ref 36.6–50.3)
HGB BLD-MCNC: 16.4 G/DL (ref 12.1–17)
HGB UR QL STRIP: NEGATIVE
HYALINE CASTS URNS QL MICRO: NORMAL /LPF (ref 0–5)
IMM GRANULOCYTES # BLD: 0 K/UL (ref 0–0.04)
IMM GRANULOCYTES NFR BLD AUTO: 0 % (ref 0–0.5)
INR PPP: 1.1 (ref 0.9–1.1)
KETONES UR QL STRIP.AUTO: NEGATIVE MG/DL
LEUKOCYTE ESTERASE UR QL STRIP.AUTO: NEGATIVE
LYMPHOCYTES # BLD: 3.2 K/UL (ref 0.8–3.5)
LYMPHOCYTES NFR BLD: 37 % (ref 12–49)
MAGNESIUM SERPL-MCNC: 2 MG/DL (ref 1.6–2.4)
MCH RBC QN AUTO: 32.5 PG (ref 26–34)
MCHC RBC AUTO-ENTMCNC: 34.2 G/DL (ref 30–36.5)
MCV RBC AUTO: 95 FL (ref 80–99)
MONOCYTES # BLD: 0.7 K/UL (ref 0–1)
MONOCYTES NFR BLD: 8 % (ref 5–13)
NEUTS SEG # BLD: 4.4 K/UL (ref 1.8–8)
NEUTS SEG NFR BLD: 52 % (ref 32–75)
NITRITE UR QL STRIP.AUTO: NEGATIVE
NRBC # BLD: 0 K/UL (ref 0–0.01)
NRBC BLD-RTO: 0 PER 100 WBC
P-R INTERVAL, ECG05: 198 MS
P-R INTERVAL, ECG05: 200 MS
PH UR STRIP: 7 [PH] (ref 5–8)
PLATELET # BLD AUTO: 254 K/UL (ref 150–400)
PMV BLD AUTO: 9.5 FL (ref 8.9–12.9)
POTASSIUM SERPL-SCNC: 3.6 MMOL/L (ref 3.5–5.1)
PROT SERPL-MCNC: 7.4 G/DL (ref 6.4–8.2)
PROT UR STRIP-MCNC: NEGATIVE MG/DL
PROTHROMBIN TIME: 10.9 SEC (ref 9–11.1)
Q-T INTERVAL, ECG07: 454 MS
Q-T INTERVAL, ECG07: 490 MS
QRS DURATION, ECG06: 134 MS
QRS DURATION, ECG06: 138 MS
QTC CALCULATION (BEZET), ECG08: 472 MS
QTC CALCULATION (BEZET), ECG08: 481 MS
RBC # BLD AUTO: 5.05 M/UL (ref 4.1–5.7)
RBC #/AREA URNS HPF: NORMAL /HPF (ref 0–5)
SAMPLES BEING HELD,HOLD: NORMAL
SODIUM SERPL-SCNC: 140 MMOL/L (ref 136–145)
SP GR UR REFRACTOMETRY: <1.005 (ref 1–1.03)
THERAPEUTIC RANGE,PTTT: NORMAL SECS (ref 58–77)
TROPONIN I SERPL-MCNC: <0.05 NG/ML
UR CULT HOLD, URHOLD: NORMAL
UROBILINOGEN UR QL STRIP.AUTO: 0.2 EU/DL (ref 0.2–1)
VENTRICULAR RATE, ECG03: 58 BPM
VENTRICULAR RATE, ECG03: 65 BPM
WBC # BLD AUTO: 8.5 K/UL (ref 4.1–11.1)
WBC URNS QL MICRO: NORMAL /HPF (ref 0–4)

## 2018-12-26 PROCEDURE — 84484 ASSAY OF TROPONIN QUANT: CPT

## 2018-12-26 PROCEDURE — 80076 HEPATIC FUNCTION PANEL: CPT

## 2018-12-26 PROCEDURE — 70450 CT HEAD/BRAIN W/O DYE: CPT

## 2018-12-26 PROCEDURE — 93005 ELECTROCARDIOGRAM TRACING: CPT

## 2018-12-26 PROCEDURE — 36415 COLL VENOUS BLD VENIPUNCTURE: CPT

## 2018-12-26 PROCEDURE — 85730 THROMBOPLASTIN TIME PARTIAL: CPT

## 2018-12-26 PROCEDURE — 85610 PROTHROMBIN TIME: CPT

## 2018-12-26 PROCEDURE — 85025 COMPLETE CBC W/AUTO DIFF WBC: CPT

## 2018-12-26 PROCEDURE — 82550 ASSAY OF CK (CPK): CPT

## 2018-12-26 PROCEDURE — 99284 EMERGENCY DEPT VISIT MOD MDM: CPT

## 2018-12-26 PROCEDURE — 83735 ASSAY OF MAGNESIUM: CPT

## 2018-12-26 PROCEDURE — 81001 URINALYSIS AUTO W/SCOPE: CPT

## 2018-12-26 PROCEDURE — 80048 BASIC METABOLIC PNL TOTAL CA: CPT

## 2018-12-26 RX ORDER — LOSARTAN POTASSIUM AND HYDROCHLOROTHIAZIDE 12.5; 5 MG/1; MG/1
2 TABLET ORAL DAILY
Qty: 60 TAB | Refills: 0 | Status: SHIPPED | OUTPATIENT
Start: 2018-12-26 | End: 2019-01-25

## 2018-12-26 RX ORDER — MECLIZINE HYDROCHLORIDE 25 MG/1
25 TABLET ORAL
Status: DISCONTINUED | OUTPATIENT
Start: 2018-12-26 | End: 2018-12-26

## 2018-12-26 NOTE — ED TRIAGE NOTES
Pt states his gait is off, denies feeling dizzy. C/o blood pressure beign elevated. States balance is off.

## 2019-01-04 ENCOUNTER — HOSPITAL ENCOUNTER (OUTPATIENT)
Dept: MRI IMAGING | Age: 84
Discharge: HOME | End: 2019-01-04
Attending: INTERNAL MEDICINE
Payer: MEDICARE

## 2019-01-04 VITALS — BODY MASS INDEX: 27.12 KG/M2 | WEIGHT: 200 LBS

## 2019-01-04 DIAGNOSIS — R26.9 GAIT ABNORMALITY: ICD-10-CM

## 2019-01-04 PROCEDURE — 70553 MRI BRAIN STEM W/O & W/DYE: CPT

## 2019-01-04 PROCEDURE — A9575 INJ GADOTERATE MEGLUMI 0.1ML: HCPCS | Performed by: RADIOLOGY

## 2019-01-04 PROCEDURE — 74011250636 HC RX REV CODE- 250/636: Performed by: RADIOLOGY

## 2019-01-04 RX ORDER — GADOTERATE MEGLUMINE 376.9 MG/ML
18 INJECTION INTRAVENOUS
Status: COMPLETED | OUTPATIENT
Start: 2019-01-04 | End: 2019-01-04

## 2019-01-04 RX ADMIN — GADOTERATE MEGLUMINE 18 ML: 376.9 INJECTION INTRAVENOUS at 07:49

## 2019-02-01 ENCOUNTER — HOSPITAL ENCOUNTER (OUTPATIENT)
Dept: VASCULAR SURGERY | Age: 84
Discharge: HOME | End: 2019-02-01
Attending: FAMILY MEDICINE
Payer: MEDICARE

## 2019-02-01 DIAGNOSIS — R52 PAIN: ICD-10-CM

## 2019-02-01 DIAGNOSIS — I80.232: ICD-10-CM

## 2019-02-01 PROCEDURE — 93971 EXTREMITY STUDY: CPT

## 2019-02-22 ENCOUNTER — TELEPHONE (OUTPATIENT)
Dept: ONCOLOGY | Age: 84
End: 2019-02-22

## 2019-04-08 ENCOUNTER — HOSPITAL ENCOUNTER (EMERGENCY)
Age: 84
Discharge: HOME | End: 2019-04-08
Attending: EMERGENCY MEDICINE
Payer: MEDICARE

## 2019-04-08 ENCOUNTER — APPOINTMENT (OUTPATIENT)
Dept: CT IMAGING | Age: 84
End: 2019-04-08
Attending: EMERGENCY MEDICINE
Payer: MEDICARE

## 2019-04-08 VITALS
HEIGHT: 72 IN | WEIGHT: 205 LBS | BODY MASS INDEX: 27.77 KG/M2 | TEMPERATURE: 97.8 F | RESPIRATION RATE: 18 BRPM | SYSTOLIC BLOOD PRESSURE: 195 MMHG | DIASTOLIC BLOOD PRESSURE: 87 MMHG | OXYGEN SATURATION: 95 % | HEART RATE: 59 BPM

## 2019-04-08 DIAGNOSIS — I82.891 CHRONIC THROMBOSIS OF SPLENIC VEIN: Primary | ICD-10-CM

## 2019-04-08 LAB
ALBUMIN SERPL-MCNC: 3.8 G/DL (ref 3.5–5)
ALBUMIN/GLOB SERPL: 1.1 {RATIO} (ref 1.1–2.2)
ALP SERPL-CCNC: 123 U/L (ref 45–117)
ALT SERPL-CCNC: 24 U/L (ref 12–78)
ANION GAP SERPL CALC-SCNC: 7 MMOL/L (ref 5–15)
APPEARANCE UR: CLEAR
AST SERPL-CCNC: 21 U/L (ref 15–37)
BACTERIA URNS QL MICRO: NEGATIVE /HPF
BASOPHILS # BLD: 0.1 K/UL (ref 0–0.1)
BASOPHILS NFR BLD: 1 % (ref 0–1)
BILIRUB SERPL-MCNC: 0.8 MG/DL (ref 0.2–1)
BILIRUB UR QL: NEGATIVE
BUN SERPL-MCNC: 24 MG/DL (ref 6–20)
BUN/CREAT SERPL: 21 (ref 12–20)
CALCIUM SERPL-MCNC: 9.1 MG/DL (ref 8.5–10.1)
CHLORIDE SERPL-SCNC: 105 MMOL/L (ref 97–108)
CO2 SERPL-SCNC: 26 MMOL/L (ref 21–32)
COLOR UR: NORMAL
COMMENT, HOLDF: NORMAL
CREAT SERPL-MCNC: 1.14 MG/DL (ref 0.7–1.3)
DIFFERENTIAL METHOD BLD: NORMAL
EOSINOPHIL # BLD: 0.2 K/UL (ref 0–0.4)
EOSINOPHIL NFR BLD: 2 % (ref 0–7)
EPITH CASTS URNS QL MICRO: NORMAL /LPF
ERYTHROCYTE [DISTWIDTH] IN BLOOD BY AUTOMATED COUNT: 12.7 % (ref 11.5–14.5)
GLOBULIN SER CALC-MCNC: 3.4 G/DL (ref 2–4)
GLUCOSE SERPL-MCNC: 88 MG/DL (ref 65–100)
GLUCOSE UR STRIP.AUTO-MCNC: NEGATIVE MG/DL
HCT VFR BLD AUTO: 49.1 % (ref 36.6–50.3)
HGB BLD-MCNC: 16.7 G/DL (ref 12.1–17)
HGB UR QL STRIP: NEGATIVE
HYALINE CASTS URNS QL MICRO: NORMAL /LPF (ref 0–5)
IMM GRANULOCYTES # BLD AUTO: 0 K/UL (ref 0–0.04)
IMM GRANULOCYTES NFR BLD AUTO: 0 % (ref 0–0.5)
KETONES UR QL STRIP.AUTO: NEGATIVE MG/DL
LEUKOCYTE ESTERASE UR QL STRIP.AUTO: NEGATIVE
LIPASE SERPL-CCNC: 138 U/L (ref 73–393)
LYMPHOCYTES # BLD: 3.2 K/UL (ref 0.8–3.5)
LYMPHOCYTES NFR BLD: 39 % (ref 12–49)
MCH RBC QN AUTO: 32.2 PG (ref 26–34)
MCHC RBC AUTO-ENTMCNC: 34 G/DL (ref 30–36.5)
MCV RBC AUTO: 94.6 FL (ref 80–99)
MONOCYTES # BLD: 0.8 K/UL (ref 0–1)
MONOCYTES NFR BLD: 9 % (ref 5–13)
NEUTS SEG # BLD: 4 K/UL (ref 1.8–8)
NEUTS SEG NFR BLD: 49 % (ref 32–75)
NITRITE UR QL STRIP.AUTO: NEGATIVE
NRBC # BLD: 0 K/UL (ref 0–0.01)
NRBC BLD-RTO: 0 PER 100 WBC
PH UR STRIP: 6 [PH] (ref 5–8)
PLATELET # BLD AUTO: 173 K/UL (ref 150–400)
PMV BLD AUTO: 9.7 FL (ref 8.9–12.9)
POTASSIUM SERPL-SCNC: 3.7 MMOL/L (ref 3.5–5.1)
PROT SERPL-MCNC: 7.2 G/DL (ref 6.4–8.2)
PROT UR STRIP-MCNC: NEGATIVE MG/DL
RBC # BLD AUTO: 5.19 M/UL (ref 4.1–5.7)
RBC #/AREA URNS HPF: NORMAL /HPF (ref 0–5)
SAMPLES BEING HELD,HOLD: NORMAL
SODIUM SERPL-SCNC: 138 MMOL/L (ref 136–145)
SP GR UR REFRACTOMETRY: 1.01 (ref 1–1.03)
UR CULT HOLD, URHOLD: NORMAL
UROBILINOGEN UR QL STRIP.AUTO: 0.2 EU/DL (ref 0.2–1)
WBC # BLD AUTO: 8.3 K/UL (ref 4.1–11.1)
WBC URNS QL MICRO: NORMAL /HPF (ref 0–4)

## 2019-04-08 PROCEDURE — 36415 COLL VENOUS BLD VENIPUNCTURE: CPT

## 2019-04-08 PROCEDURE — 74177 CT ABD & PELVIS W/CONTRAST: CPT

## 2019-04-08 PROCEDURE — 83690 ASSAY OF LIPASE: CPT

## 2019-04-08 PROCEDURE — 74011250636 HC RX REV CODE- 250/636: Performed by: EMERGENCY MEDICINE

## 2019-04-08 PROCEDURE — 80053 COMPREHEN METABOLIC PANEL: CPT

## 2019-04-08 PROCEDURE — 81001 URINALYSIS AUTO W/SCOPE: CPT

## 2019-04-08 PROCEDURE — 96360 HYDRATION IV INFUSION INIT: CPT

## 2019-04-08 PROCEDURE — 74011636320 HC RX REV CODE- 636/320: Performed by: RADIOLOGY

## 2019-04-08 PROCEDURE — 85025 COMPLETE CBC W/AUTO DIFF WBC: CPT

## 2019-04-08 PROCEDURE — 99283 EMERGENCY DEPT VISIT LOW MDM: CPT

## 2019-04-08 PROCEDURE — 93005 ELECTROCARDIOGRAM TRACING: CPT

## 2019-04-08 RX ADMIN — SODIUM CHLORIDE 1000 ML: 900 INJECTION, SOLUTION INTRAVENOUS at 21:33

## 2019-04-08 RX ADMIN — IOPAMIDOL 100 ML: 755 INJECTION, SOLUTION INTRAVENOUS at 22:36

## 2019-04-08 NOTE — ED TRIAGE NOTES
Patient arrives with c/o L sided abdominal pain; h/o \"blood clot on spleen\" with similar symptoms.

## 2019-04-08 NOTE — ED PROVIDER NOTES
82 y.o. Male with past medical history significant for dyspnea, benign hypertension, palpitations, LBBB, and chest pain who presents from home with chief complaint of abdominal pain. Patient states that he began experiencing left-sided pain earlier today. He notes that his pain is most severe in the left side of his abdomen. He rates his discomfort as a 2 or 3 out of 10 in severity. 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 Patient states that he was taken off of his Eliquis a couple of months ago by his doctor but took 1 dosage of his Eliquis today due to his current symptoms. Patient admits to taking baby aspirin and blood pressure medications as well. Of note, patient states that he has a 30 day monitor on his chest to determine the reason for his previous splenic infarct. Patient previously visited the ED on 12/11/18 for splenic infarct, on 12/24/18 for hypertension, and on 12/26/18 for hypertension and lightheadedness. Patient reports that after stopping his diuretics in December of 2018, he began to experience a loss of equilibrium and then vertigo. Patient denies fever, chills, nausea, and vomiting. There are no other acute medical concerns at this time. Social hx: negative tobacco use; negative alcohol use; unknown drug use PCP: Rolan Sommer MD 
 
7:14 PM 
I have evaluated the patient as the Provider in Triage. I have reviewed His vital signs and the triage nurse assessment. I have talked with the patient and any available family and advised that I am the provider in triage and have ordered the appropriate study to initiate their work up based on the clinical presentation during my assessment. I have advised that the patient will be accommodated in the Main ED as soon as possible.   I have also requested to contact the triage nurse or myself immediately if the patient experiences any changes in their condition during this brief waiting period. Collette Hernandez Note written by Alpesh Arambula, as dictated by Richard Prince MD 7:14 PM 
 
 
The history is provided by the patient and the spouse. No  was used. Past Medical History:  
Diagnosis Date  Benign hypertension  Chest pain, unspecified  Dyspnea  Hypertension  LBBB (left bundle branch block)  Palpitations Past Surgical History:  
Procedure Laterality Date  HX TONSIL AND ADENOIDECTOMY No family history on file. Social History Socioeconomic History  Marital status:  Spouse name: Not on file  Number of children: Not on file  Years of education: Not on file  Highest education level: Not on file Occupational History  Not on file Social Needs  Financial resource strain: Not on file  Food insecurity:  
  Worry: Not on file Inability: Not on file  Transportation needs:  
  Medical: Not on file Non-medical: Not on file Tobacco Use  Smoking status: Never Smoker Substance and Sexual Activity  Alcohol use: No  
 Drug use: Not on file  Sexual activity: Not on file Lifestyle  Physical activity:  
  Days per week: Not on file Minutes per session: Not on file  Stress: Not on file Relationships  Social connections:  
  Talks on phone: Not on file Gets together: Not on file Attends [employer] service: Not on file Active member of club or organization: Not on file Attends meetings of clubs or organizations: Not on file Relationship status: Not on file  Intimate partner violence:  
  Fear of current or ex partner: Not on file Emotionally abused: Not on file Physically abused: Not on file Forced sexual activity: Not on file Other Topics Concern  Not on file Social History Narrative  Not on file ALLERGIES: Beta blocker [beta-blockers (beta-adrenergic blocking agts)] and Pcn [penicillins] Review of Systems Constitutional: Negative for activity change, appetite change, chills and fever. HENT: Negative for congestion and sore throat. Respiratory: Negative for cough and shortness of breath. Cardiovascular: Negative for chest pain. Gastrointestinal: Positive for abdominal pain. Negative for diarrhea, nausea and vomiting. Genitourinary: Negative for dysuria. Musculoskeletal: Negative for arthralgias and myalgias. Skin: Negative for color change. Neurological: Negative for dizziness. Psychiatric/Behavioral: The patient is not nervous/anxious. All other systems reviewed and are negative. Vitals:  
 04/08/19 1915 BP: 195/87 Pulse: (!) 59 Resp: 18 Temp: 97.8 °F (36.6 °C) SpO2: 95% Weight: 93 kg (205 lb) Height: 6' (1.829 m) Physical Exam  
Constitutional: He is oriented to person, place, and time. He appears well-developed and well-nourished. HENT:  
Head: Normocephalic and atraumatic. Right Ear: External ear normal.  
Left Ear: External ear normal.  
Mouth/Throat: Oropharynx is clear and moist. No oropharyngeal exudate. Eyes: Pupils are equal, round, and reactive to light. Conjunctivae and EOM are normal. Right eye exhibits no discharge. Left eye exhibits no discharge. No scleral icterus. Neck: Normal range of motion. Neck supple. No tracheal deviation present. No thyromegaly present. Cardiovascular: Normal rate, regular rhythm, normal heart sounds and intact distal pulses. No murmur heard. Pulmonary/Chest: Effort normal and breath sounds normal. No respiratory distress. He has no wheezes. He has no rales. Abdominal: Soft. Bowel sounds are normal. He exhibits no distension. There is tenderness in the left upper quadrant. There is no rebound and no guarding. Musculoskeletal: Normal range of motion. He exhibits no edema or tenderness. Lymphadenopathy:  
  He has no cervical adenopathy. Neurological: He is alert and oriented to person, place, and time. No cranial nerve deficit. Coordination normal.  
Skin: Skin is warm. No rash noted. No erythema. Psychiatric: He has a normal mood and affect. His behavior is normal. Judgment and thought content normal.  
Nursing note and vitals reviewed. MDM Number of Diagnoses or Management Options Chronic thrombosis of splenic vein:  
Diagnosis management comments: Assesment/Plan- 82 y.o. Patient presents with: 
Abdominal Pain 
differential includes: splenic infarct, blood clot, chronic venous thrombosis . 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 Patient is well appearing, afebrile and tolerating PO. Encouraged patient to restart eliquis. Recommend PCP follow up. Patient educated on reasons to return to the ED. Procedures

## 2019-04-09 LAB
ATRIAL RATE: 55 BPM
CALCULATED P AXIS, ECG09: 56 DEGREES
CALCULATED R AXIS, ECG10: 0 DEGREES
CALCULATED T AXIS, ECG11: 82 DEGREES
DIAGNOSIS, 93000: NORMAL
P-R INTERVAL, ECG05: 216 MS
Q-T INTERVAL, ECG07: 472 MS
QRS DURATION, ECG06: 140 MS
QTC CALCULATION (BEZET), ECG08: 451 MS
VENTRICULAR RATE, ECG03: 55 BPM

## 2019-07-03 ENCOUNTER — APPOINTMENT (OUTPATIENT)
Dept: CT IMAGING | Age: 84
DRG: 065 | End: 2019-07-03
Attending: EMERGENCY MEDICINE
Payer: MEDICARE

## 2019-07-03 ENCOUNTER — HOSPITAL ENCOUNTER (INPATIENT)
Age: 84
LOS: 2 days | Discharge: HOME HEALTH | DRG: 065 | End: 2019-07-06
Attending: EMERGENCY MEDICINE | Admitting: INTERNAL MEDICINE
Payer: MEDICARE

## 2019-07-03 ENCOUNTER — APPOINTMENT (OUTPATIENT)
Dept: MRI IMAGING | Age: 84
DRG: 065 | End: 2019-07-03
Attending: INTERNAL MEDICINE
Payer: MEDICARE

## 2019-07-03 DIAGNOSIS — I63.9 CEREBROVASCULAR ACCIDENT (CVA), UNSPECIFIED MECHANISM (HCC): ICD-10-CM

## 2019-07-03 DIAGNOSIS — H53.8 BLURRY VISION: ICD-10-CM

## 2019-07-03 DIAGNOSIS — R42 DIZZINESS: Primary | ICD-10-CM

## 2019-07-03 PROBLEM — R11.2 NAUSEA & VOMITING: Status: ACTIVE | Noted: 2019-07-03

## 2019-07-03 PROBLEM — I82.890 SPLENIC VEIN THROMBOSIS: Status: RESOLVED | Noted: 2018-12-12 | Resolved: 2019-07-03

## 2019-07-03 PROBLEM — R20.0 RIGHT ARM NUMBNESS: Status: ACTIVE | Noted: 2019-07-03

## 2019-07-03 LAB
ALBUMIN SERPL-MCNC: 3.5 G/DL (ref 3.5–5)
ALBUMIN/GLOB SERPL: 1.1 {RATIO} (ref 1.1–2.2)
ALP SERPL-CCNC: 142 U/L (ref 45–117)
ALT SERPL-CCNC: 31 U/L (ref 12–78)
ANION GAP SERPL CALC-SCNC: 7 MMOL/L (ref 5–15)
APTT PPP: 24.2 SEC (ref 22.1–32)
AST SERPL-CCNC: 27 U/L (ref 15–37)
BASOPHILS # BLD: 0 K/UL (ref 0–0.1)
BASOPHILS NFR BLD: 1 % (ref 0–1)
BILIRUB SERPL-MCNC: 1 MG/DL (ref 0.2–1)
BUN SERPL-MCNC: 21 MG/DL (ref 6–20)
BUN/CREAT SERPL: 15 (ref 12–20)
CALCIUM SERPL-MCNC: 8.6 MG/DL (ref 8.5–10.1)
CHLORIDE SERPL-SCNC: 100 MMOL/L (ref 97–108)
CO2 SERPL-SCNC: 26 MMOL/L (ref 21–32)
COMMENT, HOLDF: NORMAL
CREAT SERPL-MCNC: 1.44 MG/DL (ref 0.7–1.3)
DIFFERENTIAL METHOD BLD: ABNORMAL
EOSINOPHIL # BLD: 0 K/UL (ref 0–0.4)
EOSINOPHIL NFR BLD: 0 % (ref 0–7)
ERYTHROCYTE [DISTWIDTH] IN BLOOD BY AUTOMATED COUNT: 13 % (ref 11.5–14.5)
ETHANOL SERPL-MCNC: <10 MG/DL
GLOBULIN SER CALC-MCNC: 3.3 G/DL (ref 2–4)
GLUCOSE BLD STRIP.AUTO-MCNC: 185 MG/DL (ref 65–100)
GLUCOSE SERPL-MCNC: 194 MG/DL (ref 65–100)
HCT VFR BLD AUTO: 48.5 % (ref 36.6–50.3)
HGB BLD-MCNC: 16.6 G/DL (ref 12.1–17)
IMM GRANULOCYTES # BLD AUTO: 0 K/UL (ref 0–0.04)
IMM GRANULOCYTES NFR BLD AUTO: 1 % (ref 0–0.5)
INR PPP: 1.1 (ref 0.9–1.1)
LYMPHOCYTES # BLD: 1.1 K/UL (ref 0.8–3.5)
LYMPHOCYTES NFR BLD: 12 % (ref 12–49)
MAGNESIUM SERPL-MCNC: 2.2 MG/DL (ref 1.6–2.4)
MCH RBC QN AUTO: 32.8 PG (ref 26–34)
MCHC RBC AUTO-ENTMCNC: 34.2 G/DL (ref 30–36.5)
MCV RBC AUTO: 95.8 FL (ref 80–99)
MONOCYTES # BLD: 0.2 K/UL (ref 0–1)
MONOCYTES NFR BLD: 3 % (ref 5–13)
NEUTS SEG # BLD: 7.5 K/UL (ref 1.8–8)
NEUTS SEG NFR BLD: 83 % (ref 32–75)
NRBC # BLD: 0 K/UL (ref 0–0.01)
NRBC BLD-RTO: 0 PER 100 WBC
PLATELET # BLD AUTO: 156 K/UL (ref 150–400)
PMV BLD AUTO: 9.8 FL (ref 8.9–12.9)
POTASSIUM SERPL-SCNC: 4.3 MMOL/L (ref 3.5–5.1)
PROT SERPL-MCNC: 6.8 G/DL (ref 6.4–8.2)
PROTHROMBIN TIME: 11.4 SEC (ref 9–11.1)
RBC # BLD AUTO: 5.06 M/UL (ref 4.1–5.7)
SAMPLES BEING HELD,HOLD: NORMAL
SERVICE CMNT-IMP: ABNORMAL
SODIUM SERPL-SCNC: 133 MMOL/L (ref 136–145)
THERAPEUTIC RANGE,PTTT: NORMAL SECS (ref 58–77)
WBC # BLD AUTO: 8.9 K/UL (ref 4.1–11.1)

## 2019-07-03 PROCEDURE — 81001 URINALYSIS AUTO W/SCOPE: CPT

## 2019-07-03 PROCEDURE — 82962 GLUCOSE BLOOD TEST: CPT

## 2019-07-03 PROCEDURE — 99218 HC RM OBSERVATION: CPT

## 2019-07-03 PROCEDURE — 83735 ASSAY OF MAGNESIUM: CPT

## 2019-07-03 PROCEDURE — 85610 PROTHROMBIN TIME: CPT

## 2019-07-03 PROCEDURE — 70496 CT ANGIOGRAPHY HEAD: CPT

## 2019-07-03 PROCEDURE — 70450 CT HEAD/BRAIN W/O DYE: CPT

## 2019-07-03 PROCEDURE — 36415 COLL VENOUS BLD VENIPUNCTURE: CPT

## 2019-07-03 PROCEDURE — 82607 VITAMIN B-12: CPT

## 2019-07-03 PROCEDURE — 96361 HYDRATE IV INFUSION ADD-ON: CPT

## 2019-07-03 PROCEDURE — 87086 URINE CULTURE/COLONY COUNT: CPT

## 2019-07-03 PROCEDURE — 70551 MRI BRAIN STEM W/O DYE: CPT

## 2019-07-03 PROCEDURE — 80307 DRUG TEST PRSMV CHEM ANLYZR: CPT

## 2019-07-03 PROCEDURE — 99285 EMERGENCY DEPT VISIT HI MDM: CPT

## 2019-07-03 PROCEDURE — 74011250636 HC RX REV CODE- 250/636: Performed by: INTERNAL MEDICINE

## 2019-07-03 PROCEDURE — 96374 THER/PROPH/DIAG INJ IV PUSH: CPT

## 2019-07-03 PROCEDURE — 85730 THROMBOPLASTIN TIME PARTIAL: CPT

## 2019-07-03 PROCEDURE — 83036 HEMOGLOBIN GLYCOSYLATED A1C: CPT

## 2019-07-03 PROCEDURE — 80053 COMPREHEN METABOLIC PANEL: CPT

## 2019-07-03 PROCEDURE — 74011636320 HC RX REV CODE- 636/320: Performed by: RADIOLOGY

## 2019-07-03 PROCEDURE — 80061 LIPID PANEL: CPT

## 2019-07-03 PROCEDURE — 85025 COMPLETE CBC W/AUTO DIFF WBC: CPT

## 2019-07-03 PROCEDURE — 93005 ELECTROCARDIOGRAM TRACING: CPT

## 2019-07-03 PROCEDURE — 74011250636 HC RX REV CODE- 250/636: Performed by: EMERGENCY MEDICINE

## 2019-07-03 RX ORDER — PROPRANOLOL HYDROCHLORIDE 10 MG/1
5 TABLET ORAL 2 TIMES DAILY
COMMUNITY
End: 2019-07-06

## 2019-07-03 RX ORDER — ASPIRIN 325 MG
325 TABLET ORAL
Status: DISCONTINUED | OUTPATIENT
Start: 2019-07-03 | End: 2019-07-04 | Stop reason: SDUPTHER

## 2019-07-03 RX ORDER — ONDANSETRON 2 MG/ML
4 INJECTION INTRAMUSCULAR; INTRAVENOUS
Status: DISCONTINUED | OUTPATIENT
Start: 2019-07-03 | End: 2019-07-06 | Stop reason: HOSPADM

## 2019-07-03 RX ORDER — ONDANSETRON 2 MG/ML
4 INJECTION INTRAMUSCULAR; INTRAVENOUS
Status: COMPLETED | OUTPATIENT
Start: 2019-07-03 | End: 2019-07-03

## 2019-07-03 RX ORDER — GUAIFENESIN 100 MG/5ML
81 LIQUID (ML) ORAL DAILY
Status: DISCONTINUED | OUTPATIENT
Start: 2019-07-04 | End: 2019-07-04 | Stop reason: SDUPTHER

## 2019-07-03 RX ORDER — ACETAMINOPHEN 325 MG/1
650 TABLET ORAL
Status: DISCONTINUED | OUTPATIENT
Start: 2019-07-03 | End: 2019-07-06 | Stop reason: HOSPADM

## 2019-07-03 RX ORDER — PROPRANOLOL HYDROCHLORIDE 10 MG/1
5 TABLET ORAL 2 TIMES DAILY
Status: DISCONTINUED | OUTPATIENT
Start: 2019-07-03 | End: 2019-07-04

## 2019-07-03 RX ORDER — SODIUM CHLORIDE 9 MG/ML
75 INJECTION, SOLUTION INTRAVENOUS CONTINUOUS
Status: DISCONTINUED | OUTPATIENT
Start: 2019-07-03 | End: 2019-07-06

## 2019-07-03 RX ORDER — DIPHENHYDRAMINE HCL 25 MG
25 CAPSULE ORAL
Status: DISCONTINUED | OUTPATIENT
Start: 2019-07-03 | End: 2019-07-06

## 2019-07-03 RX ORDER — MECLIZINE HYDROCHLORIDE 25 MG/1
25 TABLET ORAL
Status: DISCONTINUED | OUTPATIENT
Start: 2019-07-03 | End: 2019-07-03

## 2019-07-03 RX ADMIN — IOPAMIDOL 100 ML: 755 INJECTION, SOLUTION INTRAVENOUS at 19:42

## 2019-07-03 RX ADMIN — SODIUM CHLORIDE 75 ML/HR: 900 INJECTION, SOLUTION INTRAVENOUS at 23:22

## 2019-07-03 RX ADMIN — SODIUM CHLORIDE 1000 ML: 900 INJECTION, SOLUTION INTRAVENOUS at 20:01

## 2019-07-03 RX ADMIN — ONDANSETRON 4 MG: 2 INJECTION INTRAMUSCULAR; INTRAVENOUS at 20:01

## 2019-07-03 RX ADMIN — SODIUM CHLORIDE 1000 ML: 900 INJECTION, SOLUTION INTRAVENOUS at 22:07

## 2019-07-03 NOTE — ED PROVIDER NOTES
I have evaluated the patient as the Provider in Triage. I have reviewed His vital signs and the triage nurse assessment. I have talked with the patient and any available family and advised that I am the provider in triage and have ordered the appropriate study to initiate their work up based on the clinical presentation during my assessment. I have advised that the patient will be accommodated in the Main ED as soon as possible. I have also requested to contact the triage nurse or myself immediately if the patient experiences any changes in their condition during this brief waiting period. Pt states he woke up this morning with dizziness. Pt also c/o blurred vision, accompanied by N/V today. Pt reports right arm numbness \"for a couple of hours\". Pt went to bed at approx 2330 last night and was asymptomatic. Pt notes hx of balance issues for months, which became worse today. Pt's son also reports Pt c/o testicle pain and was prescribed bactrim 2 days ago. Pt denies headache, chest pain, or SOB. Hx of vertigo. Note written by Alpesh Cummings, as dictated by Qian Cabello MD 7:15 PM      The history is provided by the patient and a relative. No  was used. Past Medical History:   Diagnosis Date    Benign hypertension     Chest pain, unspecified     Dyspnea     Hypertension     LBBB (left bundle branch block)     Palpitations        Past Surgical History:   Procedure Laterality Date    HX TONSIL AND ADENOIDECTOMY           No family history on file.     Social History     Socioeconomic History    Marital status:      Spouse name: Not on file    Number of children: Not on file    Years of education: Not on file    Highest education level: Not on file   Occupational History    Not on file   Social Needs    Financial resource strain: Not on file    Food insecurity:     Worry: Not on file     Inability: Not on file    Transportation needs:     Medical: Not on file     Non-medical: Not on file   Tobacco Use    Smoking status: Never Smoker   Substance and Sexual Activity    Alcohol use: No    Drug use: Not on file    Sexual activity: Not on file   Lifestyle    Physical activity:     Days per week: Not on file     Minutes per session: Not on file    Stress: Not on file   Relationships    Social connections:     Talks on phone: Not on file     Gets together: Not on file     Attends [employer] service: Not on file     Active member of club or organization: Not on file     Attends meetings of clubs or organizations: Not on file     Relationship status: Not on file    Intimate partner violence:     Fear of current or ex partner: Not on file     Emotionally abused: Not on file     Physically abused: Not on file     Forced sexual activity: Not on file   Other Topics Concern    Not on file   Social History Narrative    Not on file         ALLERGIES: Beta blocker [beta-blockers (beta-adrenergic blocking agts)] and Pcn [penicillins]    Review of Systems   Respiratory: Negative for shortness of breath. Cardiovascular: Negative for chest pain. Gastrointestinal: Positive for nausea and vomiting. Negative for abdominal pain and diarrhea. Musculoskeletal: Negative for back pain. Neurological: Positive for dizziness and numbness. Negative for headaches. Visit Vitals  /72 (BP 1 Location: Left arm, BP Patient Position: At rest)   Pulse 66   Temp 97.4 °F (36.3 °C)   Resp 18   Ht 6' (1.829 m)   Wt 93.8 kg (206 lb 12.7 oz)   SpO2 96%   BMI 28.05 kg/m²             Physical Exam   Constitutional: He is oriented to person, place, and time. He appears well-developed and well-nourished. No distress. HENT:   Head: Normocephalic. Mouth/Throat: Oropharynx is clear and moist.   Eyes: Pupils are equal, round, and reactive to light. Conjunctivae are normal.   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 Neck supple. No JVD present.    Cardiovascular: Normal rate, regular rhythm, normal heart sounds and intact distal pulses. Pulmonary/Chest: Effort normal and breath sounds normal.   Abdominal: Soft. Bowel sounds are normal. He exhibits no distension. There is no tenderness. Musculoskeletal: Normal range of motion. He exhibits no edema, tenderness or deformity. Lymphadenopathy:     He has no cervical adenopathy. Neurological: He is alert and oriented to person, place, and time. A cranial nerve deficit (3rd cranial nerve palsy (chronic per pt)) is present. No sensory deficit. Unable to assess gait. Pt off balance. Skin: Skin is warm and dry. Capillary refill takes less than 2 seconds. No rash noted. He is not diaphoretic. No erythema. Psychiatric: He has a normal mood and affect. Nursing note and vitals reviewed. MDM       Procedures    teleneuro 7:33 PM Dr. Maribell Acosta  Will see pt on telescreen when he returns from CT    ED EKG interpretation:  Rhythm: normal sinus rhythm; and regular . Rate (approx.): 66; left bundle branch block. Intervals prolonged. No ST elevation or depression. EKG documented by Susy Lovelace MD, scribe, as interpreted by Francisco Joyce MD, ED MD.       Dr. Maribell Acosta has evaluated pt and recommends admit for CVA work up for possible brainstem CVA. Hospitalist Augustus for Admission to DR. Eulalio Hanson PM    ED Room Number: ER04/04  Patient Name and age:  Andreina Rosales 82 y.o.  male  Working Diagnosis:   1.  Dizziness      Readmission: no  Isolation Requirements:  no  Recommended Level of Care:  step down  Code Status:  Full    Susy Lovelace MD

## 2019-07-03 NOTE — ED TRIAGE NOTES
Dizziness with blurred vision when woke up this a.m. With difficulty with balance. States feeling off balance has been going on for months but worse today. Right hand numbness for \"a few hours\".  in triage. Code S level 2 called from triage. Son adds pt has painful testicle and given bactrim 2 days ago.   Pt does have hx of vertigo

## 2019-07-04 ENCOUNTER — APPOINTMENT (OUTPATIENT)
Dept: NON INVASIVE DIAGNOSTICS | Age: 84
DRG: 065 | End: 2019-07-04
Attending: INTERNAL MEDICINE
Payer: MEDICARE

## 2019-07-04 ENCOUNTER — APPOINTMENT (OUTPATIENT)
Dept: ULTRASOUND IMAGING | Age: 84
DRG: 065 | End: 2019-07-04
Attending: INTERNAL MEDICINE
Payer: MEDICARE

## 2019-07-04 PROBLEM — I63.9 ACUTE CVA (CEREBROVASCULAR ACCIDENT) (HCC): Status: ACTIVE | Noted: 2019-07-04

## 2019-07-04 LAB
ALBUMIN SERPL-MCNC: 3.2 G/DL (ref 3.5–5)
ALBUMIN/GLOB SERPL: 1 {RATIO} (ref 1.1–2.2)
ALP SERPL-CCNC: 125 U/L (ref 45–117)
ALT SERPL-CCNC: 26 U/L (ref 12–78)
AMPHET UR QL SCN: NEGATIVE
ANION GAP SERPL CALC-SCNC: 7 MMOL/L (ref 5–15)
APPEARANCE UR: CLEAR
AST SERPL-CCNC: 23 U/L (ref 15–37)
BACTERIA URNS QL MICRO: NEGATIVE /HPF
BARBITURATES UR QL SCN: NEGATIVE
BENZODIAZ UR QL: NEGATIVE
BILIRUB SERPL-MCNC: 0.7 MG/DL (ref 0.2–1)
BILIRUB UR QL: NEGATIVE
BUN SERPL-MCNC: 25 MG/DL (ref 6–20)
BUN/CREAT SERPL: 19 (ref 12–20)
CALCIUM SERPL-MCNC: 8.6 MG/DL (ref 8.5–10.1)
CANNABINOIDS UR QL SCN: NEGATIVE
CHLORIDE SERPL-SCNC: 107 MMOL/L (ref 97–108)
CHOLEST SERPL-MCNC: 118 MG/DL
CHOLEST SERPL-MCNC: 137 MG/DL
CO2 SERPL-SCNC: 25 MMOL/L (ref 21–32)
COCAINE UR QL SCN: NEGATIVE
COLOR UR: NORMAL
COMMENT, HOLDF: NORMAL
CREAT SERPL-MCNC: 1.32 MG/DL (ref 0.7–1.3)
DRUG SCRN COMMENT,DRGCM: NORMAL
ECHO AO ROOT DIAM: 3.4 CM
ECHO AV AREA PEAK VELOCITY: 2.6 CM2
ECHO AV PEAK GRADIENT: 5.7 MMHG
ECHO AV PEAK VELOCITY: 119.84 CM/S
ECHO EST RA PRESSURE: 8 MMHG
ECHO LA MAJOR AXIS: 3.47 CM
ECHO LA TO AORTIC ROOT RATIO: 1.02
ECHO LA VOL 2C: 43.77 ML (ref 18–58)
ECHO LA VOL 4C: 41.97 ML (ref 18–58)
ECHO LA VOL BP: 45.98 ML (ref 18–58)
ECHO LA VOL/BSA BIPLANE: 21.31 ML/M2 (ref 16–28)
ECHO LA VOLUME INDEX A2C: 20.29 ML/M2 (ref 16–28)
ECHO LA VOLUME INDEX A4C: 19.45 ML/M2 (ref 16–28)
ECHO LV INTERNAL DIMENSION DIASTOLIC: 4.58 CM (ref 4.2–5.9)
ECHO LV INTERNAL DIMENSION SYSTOLIC: 3.15 CM
ECHO LV IVSD: 1.09 CM (ref 0.6–1)
ECHO LV MASS 2D: 174.7 G (ref 88–224)
ECHO LV MASS INDEX 2D: 81 G/M2 (ref 49–115)
ECHO LV POSTERIOR WALL DIASTOLIC: 0.85 CM (ref 0.6–1)
ECHO LVOT DIAM: 2.05 CM
ECHO LVOT PEAK GRADIENT: 3.6 MMHG
ECHO LVOT PEAK VELOCITY: 94.22 CM/S
ECHO MV A VELOCITY: 79.96 CM/S
ECHO MV E DECELERATION TIME (DT): 247.6 MS
ECHO MV E VELOCITY: 82.22 CM/S
ECHO MV E/A RATIO: 1.03
ECHO MV REGURGITANT PEAK GRADIENT: 45.9 MMHG
ECHO MV REGURGITANT PEAK VELOCITY: 338.77 CM/S
ECHO PULMONARY ARTERY SYSTOLIC PRESSURE (PASP): 32.4 MMHG
ECHO PV MAX VELOCITY: 78.15 CM/S
ECHO PV PEAK GRADIENT: 2.4 MMHG
ECHO RIGHT VENTRICULAR SYSTOLIC PRESSURE (RVSP): 32.4 MMHG
ECHO RV INTERNAL DIMENSION: 3.4 CM
ECHO TV REGURGITANT MAX VELOCITY: 246.94 CM/S
ECHO TV REGURGITANT PEAK GRADIENT: 24.4 MMHG
EPITH CASTS URNS QL MICRO: NORMAL /LPF
EST. AVERAGE GLUCOSE BLD GHB EST-MCNC: 111 MG/DL
GLOBULIN SER CALC-MCNC: 3.1 G/DL (ref 2–4)
GLUCOSE SERPL-MCNC: 116 MG/DL (ref 65–100)
GLUCOSE UR STRIP.AUTO-MCNC: NEGATIVE MG/DL
HBA1C MFR BLD: 5.5 % (ref 4.2–6.3)
HDLC SERPL-MCNC: 45 MG/DL
HDLC SERPL-MCNC: 51 MG/DL
HDLC SERPL: 2.6 {RATIO} (ref 0–5)
HDLC SERPL: 2.7 {RATIO} (ref 0–5)
HGB UR QL STRIP: NEGATIVE
HYALINE CASTS URNS QL MICRO: NORMAL /LPF (ref 0–5)
KETONES UR QL STRIP.AUTO: NEGATIVE MG/DL
LDLC SERPL CALC-MCNC: 57.8 MG/DL (ref 0–100)
LDLC SERPL CALC-MCNC: 76.2 MG/DL (ref 0–100)
LEUKOCYTE ESTERASE UR QL STRIP.AUTO: NEGATIVE
LIPID PROFILE,FLP: NORMAL
LIPID PROFILE,FLP: NORMAL
MAGNESIUM SERPL-MCNC: 2.6 MG/DL (ref 1.6–2.4)
METHADONE UR QL: NEGATIVE
NITRITE UR QL STRIP.AUTO: NEGATIVE
OPIATES UR QL: NEGATIVE
PCP UR QL: NEGATIVE
PH UR STRIP: 7 [PH] (ref 5–8)
POTASSIUM SERPL-SCNC: 3.8 MMOL/L (ref 3.5–5.1)
PROT SERPL-MCNC: 6.3 G/DL (ref 6.4–8.2)
PROT UR STRIP-MCNC: NEGATIVE MG/DL
RBC #/AREA URNS HPF: NORMAL /HPF (ref 0–5)
SAMPLES BEING HELD,HOLD: NORMAL
SODIUM SERPL-SCNC: 139 MMOL/L (ref 136–145)
SP GR UR REFRACTOMETRY: 1.01 (ref 1–1.03)
TRIGL SERPL-MCNC: 49 MG/DL (ref ?–150)
TRIGL SERPL-MCNC: 76 MG/DL (ref ?–150)
UA: UC IF INDICATED,UAUC: NORMAL
UROBILINOGEN UR QL STRIP.AUTO: 1 EU/DL (ref 0.2–1)
VIT B12 SERPL-MCNC: 630 PG/ML (ref 193–986)
VLDLC SERPL CALC-MCNC: 15.2 MG/DL
VLDLC SERPL CALC-MCNC: 9.8 MG/DL
WBC URNS QL MICRO: NORMAL /HPF (ref 0–4)

## 2019-07-04 PROCEDURE — 76870 US EXAM SCROTUM: CPT

## 2019-07-04 PROCEDURE — 36415 COLL VENOUS BLD VENIPUNCTURE: CPT

## 2019-07-04 PROCEDURE — 74011250637 HC RX REV CODE- 250/637: Performed by: INTERNAL MEDICINE

## 2019-07-04 PROCEDURE — 97112 NEUROMUSCULAR REEDUCATION: CPT | Performed by: OCCUPATIONAL THERAPIST

## 2019-07-04 PROCEDURE — 92610 EVALUATE SWALLOWING FUNCTION: CPT

## 2019-07-04 PROCEDURE — 99218 HC RM OBSERVATION: CPT

## 2019-07-04 PROCEDURE — 93306 TTE W/DOPPLER COMPLETE: CPT

## 2019-07-04 PROCEDURE — 97163 PT EVAL HIGH COMPLEX 45 MIN: CPT

## 2019-07-04 PROCEDURE — 80053 COMPREHEN METABOLIC PANEL: CPT

## 2019-07-04 PROCEDURE — 83735 ASSAY OF MAGNESIUM: CPT

## 2019-07-04 PROCEDURE — 97165 OT EVAL LOW COMPLEX 30 MIN: CPT | Performed by: OCCUPATIONAL THERAPIST

## 2019-07-04 PROCEDURE — 65660000000 HC RM CCU STEPDOWN

## 2019-07-04 PROCEDURE — 80061 LIPID PANEL: CPT

## 2019-07-04 PROCEDURE — 97116 GAIT TRAINING THERAPY: CPT

## 2019-07-04 PROCEDURE — 97530 THERAPEUTIC ACTIVITIES: CPT

## 2019-07-04 RX ORDER — ASPIRIN 300 MG/1
300 SUPPOSITORY RECTAL DAILY
Status: DISCONTINUED | OUTPATIENT
Start: 2019-07-04 | End: 2019-07-04

## 2019-07-04 RX ORDER — ACETAMINOPHEN 650 MG/1
650 SUPPOSITORY RECTAL
Status: DISCONTINUED | OUTPATIENT
Start: 2019-07-04 | End: 2019-07-06

## 2019-07-04 RX ORDER — HEPARIN SODIUM 5000 [USP'U]/ML
5000 INJECTION, SOLUTION INTRAVENOUS; SUBCUTANEOUS ONCE
Status: DISCONTINUED | OUTPATIENT
Start: 2019-07-04 | End: 2019-07-04

## 2019-07-04 RX ORDER — SODIUM CHLORIDE 0.9 % (FLUSH) 0.9 %
5-40 SYRINGE (ML) INJECTION AS NEEDED
Status: DISCONTINUED | OUTPATIENT
Start: 2019-07-04 | End: 2019-07-06 | Stop reason: HOSPADM

## 2019-07-04 RX ORDER — ATORVASTATIN CALCIUM 10 MG/1
10 TABLET, FILM COATED ORAL
Status: DISCONTINUED | OUTPATIENT
Start: 2019-07-04 | End: 2019-07-04

## 2019-07-04 RX ORDER — ACETAMINOPHEN 325 MG/1
650 TABLET ORAL
Status: DISCONTINUED | OUTPATIENT
Start: 2019-07-04 | End: 2019-07-06

## 2019-07-04 RX ORDER — SODIUM CHLORIDE 0.9 % (FLUSH) 0.9 %
5-40 SYRINGE (ML) INJECTION EVERY 8 HOURS
Status: DISCONTINUED | OUTPATIENT
Start: 2019-07-04 | End: 2019-07-06 | Stop reason: HOSPADM

## 2019-07-04 RX ORDER — ATORVASTATIN CALCIUM 20 MG/1
40 TABLET, FILM COATED ORAL
Status: DISCONTINUED | OUTPATIENT
Start: 2019-07-04 | End: 2019-07-04

## 2019-07-04 RX ADMIN — APIXABAN 5 MG: 5 TABLET, FILM COATED ORAL at 16:44

## 2019-07-04 RX ADMIN — Medication 10 ML: at 22:00

## 2019-07-04 RX ADMIN — PROPRANOLOL HYDROCHLORIDE 5 MG: 10 TABLET ORAL at 09:24

## 2019-07-04 RX ADMIN — ASPIRIN 300 MG: 300 SUPPOSITORY RECTAL at 09:23

## 2019-07-04 RX ADMIN — Medication 10 ML: at 14:00

## 2019-07-04 RX ADMIN — Medication 10 ML: at 09:25

## 2019-07-04 NOTE — CONSULTS
NEUROLOGY IN-PATIENT NEW CONSULTATION      7/4/2019    RE: Andreina Rosales         1935      REFERRED BY:  Lisa Frost MD      CHIEF COMPLAINT:  This is Andreina Rosales is a 82 y.o. male right handed  who had concerns including Dizziness and Vomiting. 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     ROS   (-) fever  (-) rash  All other systems reviewed and are negative    Past Medical Hx  Past Medical History:   Diagnosis Date    Benign hypertension     Chest pain, unspecified     CHF (congestive heart failure), NYHA class I, chronic, diastolic (HCC)     Dyspnea     Hypertension     LBBB (left bundle branch block)     Palpitations     Vertigo        Social Hx  Social History     Socioeconomic History    Marital status:      Spouse name: Not on file    Number of children: Not on file    Years of education: Not on file    Highest education level: Not on file   Tobacco Use    Smoking status: Never Smoker    Smokeless tobacco: Never Used   Substance and Sexual Activity    Alcohol use: No    Drug use: Never       Family Hx  Family History   Problem Relation Age of Onset    Cancer Mother     Stroke Mother     Cancer Father        ALLERGIES  Allergies   Allergen Reactions    Pcn [Penicillins] Unknown (comments)       CURRENT MEDS  Current Facility-Administered Medications   Medication Dose Route Frequency Provider Last Rate Last Dose    sodium chloride (NS) flush 5-40 mL  5-40 mL IntraVENous Q8H Kierra Mai MD   10 mL at 07/04/19 0925    sodium chloride (NS) flush 5-40 mL  5-40 mL IntraVENous PRN Kierra Mai MD        acetaminophen (TYLENOL) tablet 650 mg  650 mg Oral Q4H PRN Kierra Mai MD        Or   [address] [place] [address] acetaminophen (TYLENOL) solution 650 mg  650 mg Per NG tube Q4H PRN Kierra Mai MD        Or   [address] [place] [address] [place] acetaminophen (TYLENOL) suppository 650 mg  650 mg Rectal Q4H PRN Pancho Banks MD        aspirin (ASA) suppository 300 mg  300 mg Rectal DAILY Pancho Banks MD   300 mg at 07/04/19 6088    atorvastatin (LIPITOR) tablet 40 mg  40 mg Oral Macy Wilson MD        0.9% sodium chloride infusion  75 mL/hr IntraVENous CONTINUOUS Madison Arias MD 75 mL/hr at 07/03/19 2322 75 mL/hr at 07/03/19 2322    acetaminophen (TYLENOL) tablet 650 mg  650 mg Oral Q4H PRN Madison Arias MD        diphenhydrAMINE (BENADRYL) capsule 25 mg  25 mg Oral Q4H PRN Madison Arias MD        ondansetron [facility]) injection 4 mg  4 mg IntraVENous Q4H PRN Madison Arias MD               PREVIOUS WORKUP: (reviewed)  IMAGING:    CT Results (recent):  Results from Hospital Encounter encounter on 07/03/19   CTA CODE NEURO HEAD AND NECK W CONT    Narrative *PRELIMINARY REPORT*  No emergent process. Significant stenosis at the origin of the A1 segment. Stenosis at the origin of the right P1 segment. .  Findings of this exam were discussed with Thony Vivas by Dr. Arielle Hernandez by  telephone at approximately, 7/3/2019 8:04 PM.  789  . Preliminary report was provided by Dr. Arielle Hernandez  Final report to follow. *END PRELIMINARY REPORT*  CLINICAL HISTORY: Dizziness and ataxia    EXAMINATION:  CT ANGIOGRAPHY HEAD AND NECK     COMPARISON: 1/11/2012, CT head 7/3/2019, MR brain 7/3/2019    TECHNIQUE:  Following the uneventful administration of iodinated contrast  material, axial CT angiography of the head and neck was performed. Delayed axial  images through the head were also obtained. Coronal and sagittal reconstructions  were obtained. Manual postprocessing of images was performed. 3-D  Sagittal  maximal intensity projection images were obtained. 3-D Coronal maximal  intensity projections were obtained. CT dose reduction was achieved through use  of a standardized protocol tailored for this examination and automatic exposure  control for dose modulation.      FINDINGS:    Delayed contrast-enhanced head CT:    The ventricles are midline without hydrocephalus. There is no acute intra or  extra-axial hemorrhage. Mild bilateral subcortical and periventricular areas of  patchy low attenuation is nonspecific but likely related to changes of chronic  small vessel ischemic disease. The basal cisterns are clear. The paranasal  sinuses are clear. CTA NECK:    Great vessels: Normal arch anatomy with the origins patent. Right subclavian artery: Patent    Left subclavian artery: Patent    Right common carotid artery: Patent    Left common carotid artery: Patent    Cervical right internal carotid artery: Patent with proximal atherosclerosis and  less than 50% stenosis by NASCET criteria. Cervical left internal carotid artery: Patent with no significant stenosis by  NASCET criteria. Right vertebral artery: Patent    Left vertebral artery: Patent    Moderate cervical spondylosis    CTA HEAD:    Right cavernous internal carotid artery: Patent    Left cavernous internal carotid artery: Patent    Anterior cerebral arteries: Patent    Anterior communicating artery: Patent    Right middle cerebral artery: Patent with mild to moderate atherosclerosis and  focal eccentric noncalcified atheroma in the posterior M2 division causing  moderate stenosis. Left middle cerebral artery: Patent with mild atherosclerosis    Posterior communicating arteries: Patent    Posterior cerebral arteries: Patent with mild to moderate atherosclerosis    Basilar artery: Patent with mild distal atherosclerosis    Distal vertebral arteries: Patent      Impression Impression:    No evidence for acute large vessel arterial occlusion. Mild to moderate atherosclerosis in the right middle cerebral artery with focal  eccentric noncalcified atheroma in the posterior M2 division causing moderate  stenosis.  Other intracranial and extracranial atherosclerosis as described above                         MRI Results (recent):  [facility] on 07[facility]03/19   MRI BRAIN WO CONT    Narrative *PRELIMINARY REPORT*  Restricted diffusion to the left of midline in the [place] suggesting ischemia. Preliminary report was provided by Dr. Baldomero Aleman  Final report to follow. *END PRELIMINARY REPORT*                  IR Results (recent):  No results found for this or any previous visit. VAS/US Results (recent):  Results from Hospital Encounter encounter on 04/06/15   DUPLEX CAROTID BILATERAL           LABS (reviewed)  Results for orders placed or performed during the hospital encounter of 07/03/19   CBC WITH AUTOMATED DIFF   Result Value Ref Range    WBC 8.9 4.1 - 11.1 K/uL    RBC 5.06 4.10 - 5.70 M/uL    HGB 16.6 12.1 - 17.0 g/dL    HCT 48.5 36.6 - 50.3 %    MCV 95.8 80.0 - 99.0 FL    MCH 32.8 26.0 - 34.0 PG    MCHC 34.2 30.0 - 36.5 g/dL    RDW 13.0 11.5 - 14.5 %    PLATELET 365 831 - 434 K/uL    MPV 9.8 8.9 - 12.9 FL    NRBC 0.0 0  WBC    ABSOLUTE NRBC 0.00 0.00 - 0.01 K/uL    NEUTROPHILS 83 (H) 32 - 75 %    LYMPHOCYTES 12 12 - 49 %    MONOCYTES 3 (L) 5 - 13 %    EOSINOPHILS 0 0 - 7 %    BASOPHILS 1 0 - 1 %    IMMATURE GRANULOCYTES 1 (H) 0.0 - 0.5 %    ABS. NEUTROPHILS 7.5 1.8 - 8.0 K/UL    ABS. LYMPHOCYTES 1.1 0.8 - 3.5 K/UL    ABS. MONOCYTES 0.2 0.0 - 1.0 K/UL    ABS. EOSINOPHILS 0.0 0.0 - 0.4 K/UL    ABS. BASOPHILS 0.0 0.0 - 0.1 K/UL    ABS. IMM.  GRANS. 0.0 0.00 - 0.04 K/UL    DF AUTOMATED     METABOLIC PANEL, COMPREHENSIVE   Result Value Ref Range    Sodium 133 (L) 136 - 145 mmol/L    Potassium 4.3 3.5 - 5.1 mmol/L    Chloride 100 97 - 108 mmol/L    CO2 26 21 - 32 mmol/L    Anion gap 7 5 - 15 mmol/L    Glucose 194 (H) 65 - 100 mg/dL    BUN 21 (H) 6 - 20 MG/DL    Creatinine 1.44 (H) 0.70 - 1.30 MG/DL    BUN/Creatinine ratio 15 12 - 20      GFR est AA 57 (L) >60 ml/min/1.73m2    GFR est non-AA 47 (L) >60 ml/min/1.73m2    Calcium 8.6 8.5 - 10.1 MG/DL    Bilirubin, total 1.0 0.2 - 1.0 MG/DL    ALT (SGPT) 31 12 - 78 U/L    AST (SGOT) 27 15 - 37 U/L    Alk. phosphatase 142 (H) 45 - 117 U/L    Protein, total 6.8 6.4 - 8.2 g/dL    Albumin 3.5 3.5 - 5.0 g/dL    Globulin 3.3 2.0 - 4.0 g/dL    A-G Ratio 1.1 1.1 - 2.2     URINALYSIS W/ REFLEX CULTURE   Result Value Ref Range    Color YELLOW/STRAW      Appearance CLEAR CLEAR      Specific gravity 1.010 1.003 - 1.030      pH (UA) 7.0 5.0 - 8.0      Protein NEGATIVE  NEG mg/dL    Glucose NEGATIVE  NEG mg/dL    Ketone NEGATIVE  NEG mg/dL    Bilirubin NEGATIVE  NEG      Blood NEGATIVE  NEG      Urobilinogen 1.0 0.2 - 1.0 EU/dL    Nitrites NEGATIVE  NEG      Leukocyte Esterase NEGATIVE  NEG      UA:UC IF INDICATED CULTURE NOT INDICATED BY UA RESULT CNI      WBC 0-4 0 - 4 /hpf    RBC 0-5 0 - 5 /hpf    Epithelial cells FEW FEW /lpf    Bacteria NEGATIVE  NEG /hpf    Hyaline cast 0-2 0 - 5 /lpf   MAGNESIUM   Result Value Ref Range    Magnesium 2.2 1.6 - 2.4 mg/dL   PROTHROMBIN TIME + INR   Result Value Ref Range    INR 1.1 0.9 - 1.1      Prothrombin time 11.4 (H) 9.0 - 11.1 sec   PTT   Result Value Ref Range    aPTT 24.2 22.1 - 32.0 sec    aPTT, therapeutic range     58.0 - 77.0 SECS   SAMPLES BEING HELD   Result Value Ref Range    SAMPLES BEING HELD SST,RED,GREEN     COMMENT        Add-on orders for these samples will be processed based on acceptable specimen integrity and analyte stability, which may vary by analyte.    DRUG SCREEN, URINE   Result Value Ref Range    AMPHETAMINES NEGATIVE  NEG      BARBITURATES NEGATIVE  NEG      BENZODIAZEPINES NEGATIVE  NEG      COCAINE NEGATIVE  NEG      METHADONE NEGATIVE  NEG      OPIATES NEGATIVE  NEG      PCP(PHENCYCLIDINE) NEGATIVE  NEG      THC (TH-CANNABINOL) NEGATIVE  NEG      Drug screen comment (NOTE)    ETHYL ALCOHOL   Result Value Ref Range    ALCOHOL(ETHYL),SERUM <10 <10 MG/DL   VITAMIN B12   Result Value Ref Range    Vitamin B12 630 193 - 986 pg/mL   HEMOGLOBIN A1C WITH EAG   Result Value Ref Range    Hemoglobin A1c 5.5 4.2 - 6.3 %    Est. average glucose 111 mg/dL LIPID PANEL   Result Value Ref Range    LIPID PROFILE          Cholesterol, total 137 <200 MG/DL    Triglyceride 49 <150 MG/DL    HDL Cholesterol 51 MG/DL    LDL, calculated 76.2 0 - 100 MG/DL    VLDL, calculated 9.8 MG/DL    CHOL/HDL Ratio 2.7 0.0 - 5.0     MAGNESIUM   Result Value Ref Range    Magnesium 2.6 (H) 1.6 - 2.4 mg/dL   METABOLIC PANEL, COMPREHENSIVE   Result Value Ref Range    Sodium 139 136 - 145 mmol/L    Potassium 3.8 3.5 - 5.1 mmol/L    Chloride 107 97 - 108 mmol/L    CO2 25 21 - 32 mmol/L    Anion gap 7 5 - 15 mmol/L    Glucose 116 (H) 65 - 100 mg/dL    BUN 25 (H) 6 - 20 MG/DL    Creatinine 1.32 (H) 0.70 - 1.30 MG/DL    BUN/Creatinine ratio 19 12 - 20      GFR est AA >60 >60 ml/min/1.73m2    GFR est non-AA 52 (L) >60 ml/min/1.73m2    Calcium 8.6 8.5 - 10.1 MG/DL    Bilirubin, total 0.7 0.2 - 1.0 MG/DL    ALT (SGPT) 26 12 - 78 U/L    AST (SGOT) 23 15 - 37 U/L    Alk. phosphatase 125 (H) 45 - 117 U/L    Protein, total 6.3 (L) 6.4 - 8.2 g/dL    Albumin 3.2 (L) 3.5 - 5.0 g/dL    Globulin 3.1 2.0 - 4.0 g/dL    A-G Ratio 1.0 (L) 1.1 - 2.2     LIPID PANEL   Result Value Ref Range    LIPID PROFILE          Cholesterol, total 118 <200 MG/DL    Triglyceride 76 <150 MG/DL    HDL Cholesterol 45 MG/DL    LDL, calculated 57.8 0 - 100 MG/DL    VLDL, calculated 15.2 MG/DL    CHOL/HDL Ratio 2.6 0.0 - 5.0     SAMPLES BEING HELD   Result Value Ref Range    SAMPLES BEING HELD 1lav     COMMENT        Add-on orders for these samples will be processed based on acceptable specimen integrity and analyte stability, which may vary by analyte.    GLUCOSE, POC   Result Value Ref Range    Glucose (POC) 185 (H) 65 - 100 mg/dL    Performed by Maycol Corrales    EKG, 12 LEAD, INITIAL   Result Value Ref Range    Ventricular Rate 66 BPM    Atrial Rate 66 BPM    P-R Interval 236 ms    QRS Duration 150 ms    Q-T Interval 486 ms    QTC Calculation (Bezet) 509 ms    Calculated P Axis 45 degrees    Calculated R Axis 13 degrees Calculated T Axis 111 degrees    Diagnosis       Sinus rhythm with 1st degree AV block  Left bundle branch block  Abnormal ECG  When compared with ECG of 08-APR-2019 19:23,  QT has lengthened         Physical Exam:     Visit Vitals  /72   Pulse (!) 54   Temp 97.6 °F (36.4 °C)   Resp 17   Ht 6' (1.829 m)   Wt 93.4 kg (206 lb)   SpO2 94%   BMI 27.94 kg/m²     General:  Alert, cooperative, no distress. Head:  Normocephalic, without obvious abnormality, atraumatic. Eyes:  Conjunctivae/corneas clear. Lungs:  Heart:   Non labored breathing  Regular rate and rhythm, no carotid bruits   Abdomen:   Soft, non-distended   Extremities: Extremities normal, atraumatic, no cyanosis or edema. Pulses: 2+ and symmetric all extremities. Skin: Skin color, texture, turgor normal. No rashes or lesions. 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    V: mastication normal   V: facial light touch sensation  normal   VII: facial muscle function   symmetric   VIII: hearing symmetric   IX: soft palate elevation  normal   XI: trapezius strength  5/5   XI: sternocleidomastoid strength 5/5   XI: neck flexion strength  5/5   XII: tongue  midline     Motor: normal bulk and tone, no tremor              Strength: 5/5 all four extremities  Sensory: intact to LT, PP, vibration, and temperature  Reflexes: 1+ throughout; Down going toes  Coordination: Good FTN and HTS  Gait: deferred           Impression:     Dolores Garibay is a 82 y.o. 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 who yesterday developed nausea/vomiting/dizziness and R arm numbness prompting admission to the hospital. 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 RECOMMENDATIONS  1. I had a long discussion with patient and family. Discussed diagnosis, prognosis, pathophysiology and available treatment. Reviewed test results. All questions were answered. 2. Reviewed MRI brain. Discussed with family that it showed a L pontine subacute stroke  3. Cardiology seen by patient and is concern about paroxysmal afib (history of splenic infarcts). Agree with starting Eliquis. Can start today. Discussed with Dr León Herbert  4. LDL 57.8  5. Keep SBP < 140  6.  Physical therapy    Please call for questions        Thank you for the consultation      Rajesh Barone MD  Diplomate, American Board of Psychiatry and Neurology  Diplomate, Neuromuscular Medicine  Diplomate, American Board of Electrodiagnostic Medicine    Greater than 50% of time spent counseling patient        CC: Mitchell Savage MD  Fax: [phone]

## 2019-07-04 NOTE — H&P
[facility]    Hospitalist Admission Note      NAME:  Edgar Acevedo   :   1935   MRN:  [id number]     PCP:  Bladimir Heredia MD     Date/Time:  7/3/2019 8:30 PM          Subjective:     CHIEF COMPLAINT: dizziness     HISTORY OF PRESENT ILLNESS:     Mr. Luis A Hendrickson is a 82 y.o.  male who presented to the Emergency Department complaining of dizziness. It is part of a constellation of symptoms including nausea/vomiting, R arm numbness and blurry vision. He has had similar symptoms from December through , treated as BPPV by ENT. 3 days ago presented to urology for testicular swelling, placed on bactrim. He has had TIA workup, including unremarkable Carotid US a few years ago, ECHO with only mild dCHF a few months ago, and a normal for age MRI brain last year. Er workup today is unremarkable. He did not want to come to ER, his family made him come to evaluate for dehydration, due to his persistent vomiting all day. Teleneurology asked us to admit for TIA workup. Past Medical History:   Diagnosis Date    Benign hypertension     Chest pain, unspecified     CHF (congestive heart failure), NYHA class I, chronic, diastolic (HCC)     Dyspnea     Hypertension     LBBB (left bundle branch block)     Palpitations     Vertigo         Past Surgical History:   Procedure Laterality Date    HX TONSIL AND ADENOIDECTOMY         Social History     Tobacco Use    Smoking status: Never Smoker    Smokeless tobacco: Never Used   Substance Use Topics    Alcohol use: No        Family History   Problem Relation Age of Onset    Cancer Mother     Stroke Mother     Cancer Father         Allergies   Allergen Reactions    Beta Blocker [Beta-Blockers (Beta-Adrenergic Blocking Agts)] Other (comments)     \"It gives me a heart attack\", per pt med causes chest to swell and chest pain.     Pcn [Penicillins] Unknown (comments)        Prior to Admission medications    Medication Sig Start Date End Date Taking? Authorizing Provider   propranolol (INDERAL) 10 mg tablet Take 5 mg by mouth two (2) times a day. Yes Provider, Historical   magnesium oxide (MAG-OX) 400 mg tablet Take 400 mg by mouth daily. Yes Provider, Historical   cyanocobalamin 1,000 mcg tablet Take 1,000 mcg by mouth daily. Yes Provider, Historical   ascorbic acid, vitamin C, (VITAMIN C) 500 mg tablet Take 500 mg by mouth daily. Yes Provider, Historical   acetaminophen (TYLENOL) 325 mg tablet Take 2 Tabs by mouth every six (6) hours as needed.  12/12/18  Yes Jhon Day MD       Review of Systems:  (bold if positive, if negative)    Gen:  Eyes:  Visual changes,ENT:  CVS:  dizzinessPulm:  GI:  , nausea, emesis,:  MS:  Skin:  Psych:  depression, anxiety, Endo:  Hem:  Renal:  Neuro:  Numbness      Objective:      VITALS:    Vital signs reviewed; most recent are:    Visit Vitals  /70   Pulse 64   Temp 98.5 °F (36.9 °C)   Resp 15   Ht 6' (1.829 m)   Wt 95.3 kg (210 lb 1.6 oz)   SpO2 92%   BMI 28.49 kg/m²     SpO2 Readings from Last 6 Encounters:   07/03/19 92%   04/08/19 95%   12/26/18 95%   12/24/18 94%   12/12/18 94%   03/12/13 93%        No intake or output data in the 24 hours ending 07/03/19 2132     Exam:     Physical Exam:    Gen:  Well-developed, well-nourished, in no acute distress  HEENT:  Pink conjunctivae, PERRL, hearing intact to voice, moist mucous membranes  Neck:  Supple, without masses, thyroid non-tender  Resp:  No accessory muscle use, clear breath sounds without wheezes rales or rhonchi  Card:  No murmurs, normal S1, S2 without thrills, bruits or peripheral edema  Abd:  Soft, non-tender, non-distended, normoactive bowel sounds are present, no mass  Lymph:  No cervical or inguinal adenopathy  Musc:  No cyanosis or clubbing  Skin:  No rashes or ulcers, skin turgor is good  Neuro:  Cranial nerves are grossly intact, no focal motor weakness, follows commands appropriately  Psych:  Good insight, oriented to person, place and time, alert     Labs:    Recent Labs     07/03/19 1949   WBC 8.9   HGB 16.6   HCT 48.5        Recent Labs     07/03/19 1949   *   K 4.3      CO2 26   *   BUN 21*   CREA 1.44*   CA 8.6   MG 2.2   ALB 3.5   TBILI 1.0   SGOT 27   ALT 31     Lab Results   Component Value Date/Time    Glucose (POC) 185 (H) 07/03/2019 07:17 PM     No results for input(s): PH, PCO2, PO2, HCO3, FIO2 in the last 72 hours. Recent Labs     07/03/19 1949   INR 1.1     All Micro Results     Procedure Component Value Units Date/Time    CULTURE, URINE [[id number]]     Order Status:  Sent Specimen:  Urine from Clean catch            Assessment and Plan:      Dizziness - POA, unclear etiology, likely dehydration or BPPV recurrance. Admit for Brain MRI, to rule out CVA. He has had other unremarkable recent TIA workup. Check A1c and lipids. Check alcohol and drug screen. Neurology consult. Pt/OT eval and orthostatics. No longer on apixaban. Addendum - Possible SWETHA CVA per MRI. Dehydration / Hyponatremia / Renal insufficiency - Indicated by labs. Likely the cause of his dizziness. Hydrate and follow. Benign hypertension / LBBB (left bundle branch block) - Hold hydralazine in setting of dizziness and symptoms, continue propranolol    Prostate enlargement / Testicular infection - Stop bactrim for now and consult urology to determine to continue, alter or stop Abx therapy. Nausea & vomiting / Right arm numbness / Blurry vision - These symptoms could be a constellation of CVA, being treated as above. Or they could reflect somatization related to untreated anxiety. Supportive care for now. Telemetry reviewed:   normal sinus rhythm    Risk of deterioration: high      Total time spent with patient: 79 Minutes I reviewed chart, notes, data and current medications in the medical record. I have examined and treated the patient at bedside during this period.                  Care Plan discussed with: Patient, Nursing Staff and >50% of time spent in counseling and coordination of care    Discussed:  Care Plan       ___________________________________________________    Attending Physician: Martha Burciaga MD

## 2019-07-04 NOTE — PROGRESS NOTES
Bedside and Verbal shift change report given to Shari RN  (oncoming nurse) by Rhianna Leung RN / Khang Wolf RN  (offgoing nurse). Report included the following information SBAR, Kardex and Recent Results.

## 2019-07-04 NOTE — ED NOTES
Pt Throughput: Charge Nurse on 5th floor  made aware of patient's bed assignment, room 519. Costa Perez RN  Emergency Dept Charge RN.

## 2019-07-04 NOTE — PROGRESS NOTES
[facility]: MED RECONCILIATION    Comments/Recommendations:   Patient was alert, oriented and seemed to be a good historian.  Patient's wife and son were at the bed side - were questionable historians.  Beta-blocker allergy denied. Patient has tolerated propranolol since February with not issue.  Confirmed with patient and patient's wife that patient is no longer taking Eliquis, Vitamin D3, Rhea and Hydralazine.  Confirmed with patient and patient's wife no new medications, recent changes in medications or any additional OTC medication use. Medications added:     · Propranolol 10 mg: one-half tablet (5 mg) BID     Medications removed:    · Eliquis 5 mg  · Vitamin D3 1,000 units  · [place] tablet  · Hydralazine 25 mg    Medications adjusted:    · None    Information obtained from: Rx Query, Patient's wife, Patient's son, Patient - patient was alert, oriented and seemed to be a good historian. Allergies: Beta blocker [beta-blockers (beta-adrenergic blocking agts)] and Pcn [penicillins] PCN allergy information confirmed with wife and patient, Beta-blocker allergy unable to be confirmed. Prior to Admission Medications:     Medication Documentation Review Audit       Reviewed by Sin Gaines (Pharmacy Student) on 07/03/19 at 2102      Medication Sig Documenting Provider Last Dose Status Taking?   acetaminophen (TYLENOL) 325 mg tablet Take 2 Tabs by mouth every six (6) hours as needed. Zhanna Parnell MD 7/2/2019 Active Yes   ascorbic acid, vitamin C, (VITAMIN C) 500 mg tablet Take 500 mg by mouth daily. Provider, Historical 7/2/2019 Active Yes   cyanocobalamin 1,000 mcg tablet Take 1,000 mcg by mouth daily. Provider, Historical 7/2/2019 Active Yes   magnesium oxide (MAG-OX) 400 mg tablet Take 400 mg by mouth daily. Provider, Historical 7/2/2019 PM Active Yes   propranolol (INDERAL) 10 mg tablet Take 5 mg by mouth two (2) times a day.  Provider, Historical 7/3/2019 AM Active Yes Thank you,     Mehnaz Farrar   PharmD Candidate [id number]  Reviewed and approved.     Alanna Villalpando, PharmD, BCPS

## 2019-07-04 NOTE — PROGRESS NOTES
Problem: Patient Education: Go to Patient Education Activity  Goal: Patient/Family Education  Outcome: Progressing Towards Goal     Problem: TIA/CVA Stroke: 0-24 hours  Goal: Off Pathway (Use only if patient is Off Pathway)  Outcome: Progressing Towards Goal  Goal: Activity/Safety  Outcome: Progressing Towards Goal  Goal: Diagnostic Test/Procedures  Outcome: Progressing Towards Goal  Goal: Nutrition/Diet  Outcome: Progressing Towards Goal  Goal: Medications  Outcome: Progressing Towards Goal  Goal: Treatments/Interventions/Procedures  Outcome: Progressing Towards Goal     Problem: Pressure Injury - Risk of  Goal: *Prevention of pressure injury  Description  Document Rosales Scale and appropriate interventions in the flowsheet.   Outcome: Progressing Towards Goal

## 2019-07-04 NOTE — ED NOTES
TRANSFER - OUT REPORT:    Verbal report given to Vinnie(name) on Isabel De La Cruz  being transferred to [facility](unit) for routine progression of care       Report consisted of patients Situation, Background, Assessment and   Recommendations(SBAR). Information from the following report(s) SBAR, Kardex, ED Summary, MAR, Recent Results and Cardiac Rhythm NSR was reviewed with the receiving nurse. Lines:   Peripheral IV 07/03/19 Right Antecubital (Active)   Site Assessment Clean, dry, & intact 7/3/2019  7:31 PM   Phlebitis Assessment 0 7/3/2019  7:31 PM   Infiltration Assessment 0 7/3/2019  7:31 PM   Dressing Status Clean, dry, & intact 7/3/2019  7:31 PM   Hub Color/Line Status Pink 7/3/2019  7:31 PM        Opportunity for questions and clarification was provided.       Patient transported with:   Monitor  O2 @ 2 liters  Registered Nurse

## 2019-07-04 NOTE — PROGRESS NOTES
TRANSFER - IN REPORT:    Verbal report received from [id number] (name) on Rodolph Schaumann  being received from ED(unit) for routine progression of care      Report consisted of patients Situation, Background, Assessment and   Recommendations(SBAR). Information from the following report(s) SBAR, Kardex and Recent Results was reviewed with the receiving nurse. Opportunity for questions and clarification was provided. Assessment completed upon patients arrival to unit and care assumed.

## 2019-07-04 NOTE — PROGRESS NOTES
Case d/w Dr. Joseluis Tse. Chart reviewed. ASA HI ordered and asked nursing to give, early AM.  Prelim CTA H&N and MRI brain results noted and reviewed with neurology. Case discussed with neurology and cards. May benefit from Eliquis. Neurology to see today.

## 2019-07-04 NOTE — PROGRESS NOTES
Problem: Dysphagia (Adult)  Goal: *Acute Goals and Plan of Care (Insert Text)  Description  Swallowing goals initiated 7-4-19:  1) tolerate regular diet, thins without increased s/s aspiration by 7-8-19  2) speech intelligibility to remain WNL by 7-8-19   Outcome: Progressing Towards Goal   SPEECH LANGUAGE PATHOLOGY BEDSIDE SWALLOW EVALUATION  Patient: Rod Claire (12 y.o. male)  Date: 7/4/2019  Primary Diagnosis: Dizziness [R42]        Precautions: aspiration  Fall, WBAT    ASSESSMENT :  Based on the objective data described below, the patient presents with functional speech and swallow. He has minimally effortful speech that he denies. He was admitted with balance issues, dizziness, flurred vision, R hand numbness. MRI:  possible infarct in L of midline in [place]. PMH: balance issues for months this year with BPPV. Treated by ENT. Testicle pain, HTN, CP, CHF, dyspnea, LBBB. .  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 Patient will benefit from skilled intervention to address the above impairments. Patient?s rehabilitation potential is considered to be Fair  Factors which may influence rehabilitation potential include:   ? None noted  ? Mental ability/status  ? Medical condition  ? Home/family situation and support systems  ? Safety awareness  ? Pain tolerance/management  ? Other:      PLAN :  Recommendations and Planned Interventions:  Ok for regular diet, thin liquids. Will need to watch closely for increased s/s aspiration due to pontine CVa to confirm neurological stability. Frequency/Duration: Patient will be followed by speech-language pathology 3 times a week to address goals. Discharge Recommendations: To Be Determined     SUBJECTIVE:   Patient c/o vision issues. No speech or swallow concerns.  ?.    OBJECTIVE:     Past Medical History:   Diagnosis Date    Benign hypertension Chest pain, unspecified     CHF (congestive heart failure), NYHA class I, chronic, diastolic (HCC)     Dyspnea     Hypertension     LBBB (left bundle branch block)     Palpitations     Vertigo      Past Surgical History:   Procedure Laterality Date    HX TONSIL AND ADENOIDECTOMY       Prior Level of Function/Home Situation:   Home Situation  Home Environment: Private residence  # Steps to Enter: 4  Rails to Enter: Yes  Hand Rails : Right  One/Two Story Residence: Two story  # of Interior Steps: 12  Interior Rails: Left  Lift Chair Available: No  Living Alone: No  Support Systems: Spouse/Significant Other/Partner, Friends \ neighbors  Patient Expects to be Discharged to[de-identified] Private residence  Current DME Used/Available at Home: None  Diet prior to admission: regular,l thins  Current Diet:  regular, thins. HE passed the RAMON. Cognitive and Communication Status:  Neurologic State: Alert  Orientation Level: Oriented to person, Oriented to place, Oriented to situation  Cognition: Memory loss  Perception: Appears intact  Perseveration: No perseveration noted  Safety/Judgement: Lack of insight into deficits  Oral Assessment:  Oral Assessment  Labial: No impairment  Dentition: Natural  Oral Hygiene: WFL  Lingual: No impairment  Mandible: No impairment  P.O. Trials:  Patient Position: upright in bed  Vocal quality prior to P.O.: No impairment  Consistency Presented: Thin liquid  How Presented: Straw;Successive swallows   ORAL PHASE:   Bolus Acceptance: No impairment  Bolus Formation/Control: No impairment     Propulsion: No impairment  Oral Residue: None  PHARYNGEAL PHASE:   Initiation of Swallow: No impairment  Laryngeal Elevation: Functional  Aspiration Signs/Symptoms: None  Pharyngeal Phase Characteristics: No impairment, issues, or problems       SPEECH:   DDK: minimally effort in speech, which patient denied.      No aphasia or dysarthria noted at this time               NOMS:   The NOMS functional outcome measure was used to quantify this patient's level of swallowing impairment. Based on the NOMS, the patient was determined to be at level 7 for swallow function       NOMS Swallowing Levels:  Level 1 (CN): NPO  Level 2 (CM): NPO but takes consistency in therapy  Level 3 (CL): Takes less than 50% of nutrition p.o. and continues with nonoral feedings; and/or safe with mod cues; and/or max diet restriction  Level 4 (CK): Safe swallow but needs mod cues; and/or mod diet restriction; and/or still requires some nonoral feeding/supplements  Level 5 (CJ): Safe swallow with min diet restriction; and/or needs min cues  Level 6 (CI): Independent with p.o.; rare cues; usually self cues; may need to avoid some foods or needs extra time  Level 7 ([address] [place] [address]): Independent for all p.o.  NELSON. (2003). National Outcomes Measurement System (NOMS): Adult Speech-Language Pathology User's Guide. Pain:  Pain Scale 1: Numeric (0 - 10)  Pain Intensity 1: 0     After treatment:   ?            Patient left in no apparent distress sitting up in chair  ? Patient left in no apparent distress in bed  ? Call bell left within reach  ? Nursing notified  ? Caregiver present  ? Bed alarm activated    COMMUNICATION/EDUCATION:   The patient?s plan of care including recommendations, planned interventions, and recommended diet changes were discussed with: Physical Therapist and Registered Nurse. Patient was educated regarding His deficit(s) of minimal dysarthria as this relates to His diagnosis of CVA. He demonstrated Good understanding as evidenced by discussion. Family present as well. BEFAST education given. Advised him to notifiy RN of any increased speech or swallowing isseus. .  ?            Posted safety precautions in patient's room. ? Patient/family have participated as able in goal setting and plan of care.   ?            Patient/family agree to work toward stated goals and plan of care.  ?            Patient understands intent and goals of therapy, but is neutral about his/her participation. ? Patient is unable to participate in goal setting and plan of care.     Thank you for this referral.  YAZ Laird  Time Calculation: 10 mins

## 2019-07-04 NOTE — PROGRESS NOTES
Problem: Mobility Impaired (Adult and Pediatric)  Goal: *Acute Goals and Plan of Care (Insert Text)  Description  Physical Therapy Goals  Initiated 7/4/2019  1. Patient will move from supine to sit and sit to supine  in bed with contact guard assist within 7 day(s). 2.  Patient will transfer from bed to chair and chair to bed with contact guard assist using the least restrictive device within 7 day(s). 3.  Patient will perform sit to stand with contact guard assist within 7 day(s). 4.  Patient will ambulate with contact guard assist for 150 feet with the least restrictive device within 7 day(s). 5.  Patient will ascend/descend 4 stairs with 2 handrail(s) with minimal assistance/contact guard assist within 7 day(s). 6.  Patient will improve Cruz Balance score by 7 points within 7 days. Outcome: Progressing Towards Goal  Note:   PHYSICAL THERAPY EVALUATION  Patient: Isabel De La Cruz (79 y.o. male)  Date: 7/4/2019  Primary Diagnosis: Dizziness [R42]        Precautions:   Fall, WBAT      ASSESSMENT  Based on the objective data described below, the patient presents with dizziness, nausea and vomiting, blurry vision and Right arm numbness and now with a MRI confirmed Left [place] infarct. Patient today with increased balance and coordination deficits, he has frequent loss of balance consistently to the Right with upright activity and without self-correction/balance reactions and requires MIN A for steadying. Patient is impulsive and requires multiple verbal cuing for safety throughout session. Patient is with continued vision deficits of diplopia, eye patch applied by OT- please see their documentation. Patient has strength equal bilaterally, no ROM deficits. Prior to admission the following medical complexities impact his overall mobility: history of BPPV- and balance disturbance since December, CN III palsy, CHF, HTN and Left BBB. Patient today tolerated session with vitals stable- see below.  Patient would most benefit from skilled PT to address the above listed deficits and to maximize their functional independence with treatment interventions focused on neuroplasticity with increased repetition, intensity, and specificity to deficits. At discharge for continuing carryover of these principles and for maximal recovery of deficits recommend inpatient rehab. Patient was educated on all signs and symptoms of CVA including BE FAST and to call for EMS if symptoms present. Current Level of Function Impacting Discharge (mobility/balance): impulsivity, unable to ambulate >50 feet without loss of balance and bumping into objects on Right, decreased coordination    Functional Outcome Measure: The patient scored 10/56 on the FARAH outcome measure which is indicative of HIGH FALL RISK. Other factors to consider for discharge: wife unable to provide physical assist, has a multiple month balance history,due to the location of the CVA patient could present with waxing and waning symptoms     Patient will benefit from skilled therapy intervention to address the above noted impairments. PLAN :  Recommendations and Planned Interventions: bed mobility training, transfer training, gait training, therapeutic exercises, neuromuscular re-education and therapeutic activities      Frequency/Duration: Patient will be followed by physical therapy:  5 times a week to address goals. Recommendation for discharge: (in order for the patient to meet his/her long term goals)  Therapy 3 hours per day 5-7 days per week    This discharge recommendation:  Has been made in collaboration with the attending provider and/or case management    Equipment recommendations for successful discharge (if) home: TBD at rehab          SUBJECTIVE:   Patient stated ?i can tell its different. ?    OBJECTIVE DATA SUMMARY:   HISTORY:    Past Medical History:   Diagnosis Date    Benign hypertension     Chest pain, unspecified     CHF (congestive heart failure), NYHA class I, chronic, diastolic (HCC)     Dyspnea     Hypertension     LBBB (left bundle branch block)     Palpitations     Vertigo      Past Surgical History:   Procedure Laterality Date    HX TONSIL AND ADENOIDECTOMY         Personal factors and/or comorbidities impacting plan of care: see below    Home Situation  Home Environment: Private residence  # Steps to Enter: 4  Rails to Enter: Yes  Hand Rails : Right  One/Two Story Residence: Two story  # of Interior Steps: 12  Interior Rails: Left  Lift Chair Available: No  Living Alone: No  Support Systems: Spouse/Significant Other/Partner, Friends \ neighbors  Patient Expects to be Discharged to[de-identified] Private residence  Current DME Used/Available at Home: [facility], straight, Walker, rolling  Tub or Shower Type: Tub/Shower combination    EXAMINATION/PRESENTATION/DECISION MAKING:   Vitals:    07/04/19 0817 07/04/19 1056 07/04/19 1107 07/04/19 1139   BP: 97/56 101/55 134/72 134/72   BP 1 Location: Right arm Right arm Left arm    BP Patient Position: At rest;Supine Pre-activity;Supine Sitting    Pulse: (!) 52 (!) 50 (!) 54    Resp: 17      Temp: 97.6 °F (36.4 °C)      SpO2: 92% 94%     Weight:    93.4 kg (206 lb)   Height:    6' (1.829 m)       Critical Behavior:  Neurologic State: Alert, Appropriate for age  Orientation Level: Oriented X4  Cognition: Follows commands  Safety/Judgement: Awareness of environment, Decreased awareness of need for assistance, Decreased insight into deficits, Decreased awareness of need for safety, Fall prevention  Hearing: Auditory  Auditory Impairment: None  Skin:  all exposed intact  Edema: none noted  Range Of Motion:  AROM: Within functional limits           PROM: Within functional limits           Strength:    Strength:  Within functional limits(BUEs 5/5)                    Tone & Sensation:   Tone: Normal              Sensation: Impaired(R hand numbness)               Coordination:  Coordination: Generally decreased, functional  Vision:   Tracking: Able to track stimulus in all quadrants w/o difficulty  Saccades: Within Defined Limits  Convergence: Normal (within 6 inches from nose)  Diplopia: Yes  Acuity: Able to read clock/calendar on wall without difficulty; Able to read employee name badge without difficulty  Corrective Lenses: Glasses  Functional Mobility:  Bed Mobility:  Rolling: Supervision  Supine to Sit: Stand-by assistance  Sit to Supine: Stand-by assistance  Scooting: Stand-by assistance  Transfers:  Sit to Stand: Minimum assistance(LOB to R)  Stand to Sit: Minimum assistance(LOB to R)        Bed to Chair: Minimum assistance(LOB to R)              Balance:   Sitting: Impaired  Sitting - Static: Good (unsupported)  Sitting - Dynamic: Fair (occasional)  Standing: Impaired; With support  Standing - Static: Fair;Constant support  Standing - Dynamic : Fair;Constant support  Ambulation/Gait Training:  Distance (ft): 30 Feet (ft)  Assistive Device: Gait belt  Ambulation - Level of Assistance: Minimal assistance     Gait Description (WDL): Exceptions to WDL  Gait Abnormalities: Trunk sway increased; Path deviations(loss of balance without self recovery)                                      Stairs:               Therapeutic Exercises:       Functional Measure:  Cruz Balance Test:    Sitting to Standin  Standing Unsupported: 3  Sitting with Back Unsupported: 4  Standing to Sittin  Transfers: 1  Standing Unsupported with Eyes Closed: 0  Standing Unsupported with Feet Together: 0  Reach Forward with Outstretched Arm: 0   Object: 0  Turn to Look Over Shoulders: 0  Turn 360 Degrees: 0  Alternate Foot on Step/Stool: 0  Standing Unsupported One Foot in Front: 0  Stand on One Le  Total: 10         56=Maximum possible score;   0-20=High fall risk  21-40=Moderate fall risk   41-56=Low fall risk               Physical Therapy Evaluation Charge Determination   History Examination Presentation Decision-Making   HIGH Complexity :3+ comorbidities / personal factors will impact the outcome/ POC  HIGH Complexity : 4+ Standardized tests and measures addressing body structure, function, activity limitation and / or participation in recreation  MEDIUM Complexity : Evolving with changing characteristics  Other outcome measures FARAH  HIGH       Based on the above components, the patient evaluation is determined to be of the following complexity level: MEDIUM      Activity Tolerance:   Good  Please refer to the flowsheet for vital signs taken during this treatment. After treatment patient left:   Supine in bed  Bed alarm/tab alert on  Caregiver at bedside     COMMUNICATION/EDUCATION:   The patient?s plan of care was discussed with: Registered Nurse. Fall prevention education was provided and the patient/caregiver indicated understanding., Patient/family have participated as able in goal setting and plan of care. and Patient/family agree to work toward stated goals and plan of care.     Thank you for this referral.  Humberto Cagle, PT, DPT   Time Calculation: 29 mins

## 2019-07-04 NOTE — CONSULTS
Chastity Sewell MD [facility] [address]  Office [phone]      Date of  Admission: 7/3/2019  7:25 PM       Assessment/Plan:   · Possible embolic event - ? PAF hx: would recommend loop monitor to eval for PAF. Previous OP event monitor failed to demonstrate a fib. Given previous splenic infarct and now ischemic CVA there is a higher suspicion for an embolic event. His CHADs 2 Vasc score is 5. He has a 7.2 % risk of embolic event with Afib and without anticoagulation. Would start Eliquis when ok with neurology. ECHO pending. · Ischemic stroke: MRI brain shows, restricted diffusion to the left of midline in the [place] suggesting ischemia. On ASA. Neurology to see. · Prior splenic infarcts: he was admitted in Dec 2018: Mr. Medardo Hurley was admitted for treatment of splenic vein thrombosis. He was evaluated by GI and Hem/Onc with recommendations for full dose anticoagulation in December. Hem/Onc wrote for Eliquis and provided the patient with a script. · HTN:   cont inderal   · Testicular pain, L: urology to see, recent tx for UTI. D/W attending. US ordered. Thank you for allowing us to participate in [facility]. We will be happy to follow along. Please call for any questions. CARDIOLOGY ATTENDING  Patient personally seen and examined. All the elements of history and examination were personally performed. Assessment and plan was discussed and agree as written above    Mr. Medardo Hurley in [facility] had \"Geographic areas of low attenuation in the spleen suggesting infarcts. \" and then now has \"Small left pontine and left midbrain acute infarctions\". There is suspicion for a cardioembolic process. For further evaluation will check a RICKY (reviewed risks (esophageal injury, breathing complications) and benefits and he agrees to proceed; has no contraindication).   Also can consider an ILR to further look for Afib (however if Hematology feels that patient needs lifelong [address] regardless if he has Afib, then an ILR would not be necessary). Silvia Baum MD, [address] requested by Woodrow Rosales MD for *Dizziness [R42]    Subjective:  Rod Claire is a 82 y.o.   male  with PMH of BPPV, ? A fib , LBBB,  HTN, BPH, splenic infarcts  who presented to the ED with chief complaint  Department complaining of dizziness. This is part of a constellation of symptoms including nausea/vomiting, R arm numbness and blurry vision. He has had similar symptoms from December through June, treated as BPPV by ENT. 3 days ago presented to urology for testicular swelling, placed on bactrim for UTI. He has had TIA workup, including unremarkable Carotid US a few years ago, ECHO with only mild dCHF a few months ago, and a normal for age MRI brain last year. He did not want to come to ER, his family made him come to evaluate for dehydration, due to his persistent vomiting all day. There was a ? Of a fib previously, (had been on eliquis for splenic infarcts) ,  then wore event monitor for 19 days there was no a fib so per patient and wife eliquis was stopped. Sees Dr. Sheree Trotter at [facility]. The patient denies chest pain, dependent edema, diaphoresis, OBREGON, orthopnea, palpitations, PND, shortness of breath, claudication or syncope. No bleeding. Cardiographics:   Telemetry:  SB  ECG: SB LBBB    Cardiac Testing/ Procedures:   12/12/18 ECHO:  LVEF 55-60%. Grade 1 DD.  Mild TR         Patient Active Problem List    Diagnosis Date Noted    Dizziness 07/03/2019    Nausea & vomiting 07/03/2019    Right arm numbness 07/03/2019    Blurry vision 07/03/2019    Prostate enlargement 12/11/2018    Benign hypertension     LBBB (left bundle branch block)       Past Medical History:   Diagnosis Date    Benign hypertension     Chest pain, unspecified     CHF (congestive heart failure), NYHA class I, chronic, diastolic (HCC)     Dyspnea     Hypertension     LBBB (left bundle branch block)     Palpitations     Vertigo       Past Surgical History:   Procedure Laterality Date    HX TONSIL AND ADENOIDECTOMY       Allergies   Allergen Reactions    Pcn [Penicillins] Unknown (comments)      Current Facility-Administered Medications   Medication Dose Route Frequency    sodium chloride (NS) flush 5-40 mL  5-40 mL IntraVENous Q8H    sodium chloride (NS) flush 5-40 mL  5-40 mL IntraVENous PRN    acetaminophen (TYLENOL) tablet 650 mg  650 mg Oral Q4H PRN    Or    acetaminophen (TYLENOL) solution 650 mg  650 mg Per NG tube Q4H PRN    Or    acetaminophen (TYLENOL) suppository 650 mg  650 mg Rectal Q4H PRN    aspirin (ASA) suppository 300 mg  300 mg Rectal DAILY    0.9% sodium chloride infusion  75 mL/hr IntraVENous CONTINUOUS    acetaminophen (TYLENOL) tablet 650 mg  650 mg Oral Q4H PRN    diphenhydrAMINE (BENADRYL) capsule 25 mg  25 mg Oral Q4H PRN    ondansetron (ZOFRAN) injection 4 mg  4 mg IntraVENous Q4H PRN    propranolol (INDERAL) tablet 5 mg  5 mg Oral BID          Review of Symptoms:  Constitutional: positive for fatigue  ENT: diplopia  Respiratory: negative for dyspnea on exertion  Gastrointestinal: positive for nausea and vomiting  Genitourinary: + L testicular pain  Musculoskeletal:negative for back pain  Neurological: positive for dizziness and vertigo  Other systems reviewed and negative except as above.   Social History     Socioeconomic History    Marital status:      Spouse name: Not on file    Number of children: Not on file    Years of education: Not on file    Highest education level: Not on file   Tobacco Use    Smoking status: Never Smoker    Smokeless tobacco: Never Used   Substance and Sexual Activity    Alcohol use: No    Drug use: Never     Family History   Problem Relation Age of Onset    Cancer Mother     Stroke Mother     Cancer Father          Physical Exam    Visit Vitals  BP 97/56 (BP 1 Location: Right arm, BP Patient Position: At rest;Supine)   Pulse (!) 52   Temp 97.6 °F (36.4 °C)   Resp 17   Ht 6' (1.829 m)   Wt 206 lb 12.7 oz (93.8 kg)   SpO2 92%   BMI 28.05 kg/m²     General: awake, alert, and oriented. MAEx4. Mild distress 2/2 diplopia  HEENT Exam:     Normocephalic, atraumatic. Sclera anicteric . Neck: supple,  Lung Exam:     Not  dyspneic. Respirations unlabored. O2 @  92 % room air Sat: . Lungs: No wheezes, crackles, rhonchi, or rubs heard on auscultation. Heart Exam:       PMI nondisplaced,  Rate: Rhythm: regular,no murmur     No JVD, no carotid bruits, No HJR      Abdomen Exam:      obese, soft, nontender. + Bowel sounds. No palpable masses; organomegaly. No bruits or pulsatile mass. : voiding     Extremities Exam:      Atraumatic. No clubbing, cyanosis, edema, ulcers, varicose veins, rash, swelling, erythema.     Vascular Exam:      radial, , dorsalis pedis, posterior tibial, are equal and strong bilaterally     Neuro exam:  No focal deficits   Psy Exam: Normal affect, does not appear anxious or agitated        Recent Results (from the past 12 hour(s))   ETHYL ALCOHOL    Collection Time: 07/03/19 10:06 PM   Result Value Ref Range    ALCOHOL(ETHYL),SERUM <10 <10 MG/DL   VITAMIN B12    Collection Time: 07/03/19 10:06 PM   Result Value Ref Range    Vitamin B12 630 193 - 986 pg/mL   HEMOGLOBIN A1C WITH EAG    Collection Time: 07/03/19 10:06 PM   Result Value Ref Range    Hemoglobin A1c 5.5 4.2 - 6.3 %    Est. average glucose 111 mg/dL   LIPID PANEL    Collection Time: 07/03/19 10:06 PM   Result Value Ref Range    LIPID PROFILE          Cholesterol, total 137 <200 MG/DL    Triglyceride 49 <150 MG/DL    HDL Cholesterol 51 MG/DL    LDL, calculated 76.2 0 - 100 MG/DL    VLDL, calculated 9.8 MG/DL    CHOL/HDL Ratio 2.7 0.0 - 5.0     URINALYSIS W/ REFLEX CULTURE    Collection Time: 07/03/19 11:52 PM   Result Value Ref Range    Color YELLOW/STRAW      Appearance CLEAR CLEAR      Specific gravity 1.010 1.003 - 1.030      pH (UA) 7.0 5.0 - 8.0      Protein NEGATIVE  NEG mg/dL    Glucose NEGATIVE  NEG mg/dL    Ketone NEGATIVE  NEG mg/dL    Bilirubin NEGATIVE  NEG      Blood NEGATIVE  NEG      Urobilinogen 1.0 0.2 - 1.0 EU/dL    Nitrites NEGATIVE  NEG      Leukocyte Esterase NEGATIVE  NEG      UA:UC IF INDICATED CULTURE NOT INDICATED BY UA RESULT CNI      WBC 0-4 0 - 4 /hpf    RBC 0-5 0 - 5 /hpf    Epithelial cells FEW FEW /lpf    Bacteria NEGATIVE  NEG /hpf    Hyaline cast 0-2 0 - 5 /lpf   DRUG SCREEN, URINE    Collection Time: 07/03/19 11:52 PM   Result Value Ref Range    AMPHETAMINES NEGATIVE  NEG      BARBITURATES NEGATIVE  NEG      BENZODIAZEPINES NEGATIVE  NEG      COCAINE NEGATIVE  NEG      METHADONE NEGATIVE  NEG      OPIATES NEGATIVE  NEG      PCP(PHENCYCLIDINE) NEGATIVE  NEG      THC (TH-CANNABINOL) NEGATIVE  NEG      Drug screen comment (NOTE)    MAGNESIUM    Collection Time: 07/04/19  7:32 AM   Result Value Ref Range    Magnesium 2.6 (H) 1.6 - 2.4 mg/dL   METABOLIC PANEL, COMPREHENSIVE    Collection Time: 07/04/19  7:32 AM   Result Value Ref Range    Sodium 139 136 - 145 mmol/L    Potassium 3.8 3.5 - 5.1 mmol/L    Chloride 107 97 - 108 mmol/L    CO2 25 21 - 32 mmol/L    Anion gap 7 5 - 15 mmol/L    Glucose 116 (H) 65 - 100 mg/dL    BUN 25 (H) 6 - 20 MG/DL    Creatinine 1.32 (H) 0.70 - 1.30 MG/DL    BUN/Creatinine ratio 19 12 - 20      GFR est AA >60 >60 ml/min/1.73m2    GFR est non-AA 52 (L) >60 ml/min/1.73m2    Calcium 8.6 8.5 - 10.1 MG/DL    Bilirubin, total 0.7 0.2 - 1.0 MG/DL    ALT (SGPT) 26 12 - 78 U/L    AST (SGOT) 23 15 - 37 U/L    Alk.  phosphatase 125 (H) 45 - 117 U/L    Protein, total 6.3 (L) 6.4 - 8.2 g/dL    Albumin 3.2 (L) 3.5 - 5.0 g/dL    Globulin 3.1 2.0 - 4.0 g/dL    A-G Ratio 1.0 (L) 1.1 - 2.2     SAMPLES BEING HELD    Collection Time: 07/04/19  7:32 AM   Result Value Ref Range    SAMPLES BEING HELD 1lav     COMMENT        Add-on orders for these samples will be processed based on acceptable specimen integrity and analyte stability, which may vary by analyte.        Latricia Nugent MS [facility]

## 2019-07-04 NOTE — PROGRESS NOTES
[facility], [place] [address]  [address] [facility], [address] [place] [address]  [phone]      Medical Progress Note      NAME: Lorelei Jacobson   :  1935  MRM:  [id number]    Date/Time: 2019        Assessment / Plan:       Acute CVA: Question embolic in etiology. Presenting symptoms of blurry vision, dizziness, nausea & vomiting, and right arm numbness. MRI showed small left pontine and left midbrain acute infarctions. CTA showed no evidence for acute large vessel arterial occlusion, but mild to moderate atherosclerosis in the right middle cerebral artery with focal eccentric noncalcified atheroma in the posterior M2 division causing moderate stenosis. Other intracranial and extracranial atherosclerosis as described above. Lengthy discussion held with cardiology and neurology, with the recommendation of starting on Eliquis today. R/B/A discussed with pt, including pros and cons of warfarin vs NOAC (eg. Easily reversibility of warfarin but need to follow INR and drug/food interactions vs NOAC's rapid onset/offset of action, few drug interactions however less easily reversed, etc). Pt and family agree to proceed with Eliquis, and he had been on it prior for splenic infarct / splenic vein thrombosis. TTE showed no shunting at baseline or with valsalva. Noted cardiology's plan for tomorrow for RICKY. No need for statin as lipids well controlled. LDL 58.  PT/OT, would benefit from inpatient rehab. Discharge planning. Benign hypertension (): hold propranolol. Bradycardic and BP low/normal.       Prostate enlargement: followed by urology. Recently seen by urology who started on Bactrim and took for 3 days. Had testicular pain but now resolved. Scrotal US negative for testicular mass, evidence of infection, torsion, hydrocele or varicocele. UA here is negative.  No indication for abx      LBBB (left bundle branch block) (): chronic    Total time spent: 50 minutes, d/w cards, neuro, family at bedside. All questions answered to their satisfaction  Time spent in the care of this patient including reviewing records, discussing with nursing and/or other providers on the treatment team, obtaining history and examining the patient, and discussing treatment plans. Care Plan discussed with: Patient, Nursing Staff and >50% of time spent in counseling and coordination of care    Discussed:  Care Plan and D/C Planning    Prophylaxis:  Eliquis    Disposition:  [facility]/Rehab         Subjective:     Chief Complaint:  Follow up CVA    Chart/notes/labs/studies reviewed, patient examined at bedside. Feels better. Less dizzy. Still with double vision. Objective:       Vitals:        Last 24hrs VS reviewed since prior progress note. Most recent are:    Visit Vitals  /57 (BP 1 Location: Left arm, BP Patient Position: At rest)   Pulse (!) 51   Temp 98.2 °F (36.8 °C)   Resp 16   Ht 6' (1.829 m)   Wt 93.4 kg (206 lb)   SpO2 93%   BMI 27.94 kg/m²     SpO2 Readings from Last 6 Encounters:   07/04/19 93%   04/08/19 95%   12/26/18 95%   12/24/18 94%   12/12/18 94%   03/12/13 93%            Intake/Output Summary (Last 24 hours) at 7/4/2019 1435  Last data filed at 7/4/2019 1222  Gross per 24 hour   Intake 1060 ml   Output 150 ml   Net 910 ml          Exam:     Physical Exam:    Gen:  Elderly, chronically ill-appearing. NAD  HEENT:  Sclerae nonicteric, hearing intact to voice, mucous membranes moist  Neck:  Supple, without masses. Resp:  No accessory muscle use, CTAB without wheezes, rales, or rhonchi  Card: RRR, without m/r/g. No LE edema. Abd:  +bowel sounds, soft, NTTP, nondistended. No HSM. Neuro: Face symmetric, CNs3-12 intact. No focal weakness with 5/5 strength BUE and BLEs, prox and distal. Speech fluent, follows commands appropriately  Psych:  Alert, oriented x 3.  Limited insight     Medications Reviewed: (see below)    Lab Data Reviewed: (see below)    ______________________________________________________________________    Medications:     Current Facility-Administered Medications   Medication Dose Route Frequency    sodium chloride (NS) flush 5-40 mL  5-40 mL IntraVENous Q8H    sodium chloride (NS) flush 5-40 mL  5-40 mL IntraVENous PRN    acetaminophen (TYLENOL) tablet 650 mg  650 mg Oral Q4H PRN    Or    acetaminophen (TYLENOL) solution 650 mg  650 mg Per NG tube Q4H PRN    Or    acetaminophen (TYLENOL) suppository 650 mg  650 mg Rectal Q4H PRN    apixaban (ELIQUIS) tablet 5 mg  5 mg Oral BID    0.9% sodium chloride infusion  75 mL/hr IntraVENous CONTINUOUS    acetaminophen (TYLENOL) tablet 650 mg  650 mg Oral Q4H PRN    diphenhydrAMINE (BENADRYL) capsule 25 mg  25 mg Oral Q4H PRN    ondansetron (ZOFRAN) injection 4 mg  4 mg IntraVENous Q4H PRN            Lab Review:     Recent Labs     07/03/19 1949   WBC 8.9   HGB 16.6   HCT 48.5        Recent Labs     07/04/19  0732 07/03/19 1949    133*   K 3.8 4.3    100   CO2 25 26   * 194*   BUN 25* 21*   CREA 1.32* 1.44*   CA 8.6 8.6   MG 2.6* 2.2   ALB 3.2* 3.5   SGOT 23 27   ALT 26 31   INR  --  1.1     No components found for: GLPOC  No results for input(s): PH, PCO2, PO2, HCO3, FIO2 in the last 72 hours.   Recent Labs     07/03/19 1949   INR 1.1     No results found for: SDES  No results found for: CULT           ___________________________________________________    Attending Physician: Mai Rouse MD

## 2019-07-04 NOTE — PROGRESS NOTES
Bedside and Verbal shift change report given to Griselda Perdomo (oncoming nurse) by ST [facility]ING (offgoing nurse).  Report included the following information SBAR, Kardex, Intake/Output, MAR, Recent Results and Cardiac Rhythm SB.

## 2019-07-04 NOTE — ED NOTES
Stroke Education provided to patient, spouse/SO and relative(s) and the following topics were discussed    1. Patients personal risk factors for stroke are hypertension    2. Warning signs of Stroke:        * Sudden numbness or weakness of the face, arm or leg, especially on one side of          The body            * Sudden confusion, trouble speaking or understanding        * Sudden trouble seeing in one or both eyes        * Sudden trouble walking, dizziness, loss of balance or coordination        * Sudden severe headache with no known cause      3. Importance of activation Emergency Medical Services ( 9-1-1 ) immediately if experience any warning signs of stroke. 4. Be sure and schedule a follow-up appointment with your primary care doctor or any specialists as instructed. 5. You must take medicine every day to treat your risk factors for stroke. Be sure to take your medicines exactly as your doctor tells you: no more, no less. Know what your medicines are for , what they do. Anti-thrombotics /anticoagulants can help prevent strokes. You are taking the following medicine(s)  see binder     6. Smoking and second-hand smoke greatly increase your risk of stroke, cardiovascular disease and death. Smoking history never    7. Information provided was [facility] Stroke Education Paula Gomez or Verbal Education    8. Documentation of teaching completed in Patient Education Activity and on Care Plan with teaching response noted?   yes

## 2019-07-04 NOTE — PROGRESS NOTES
Problem: Self Care Deficits Care Plan (Adult)  Goal: *Acute Goals and Plan of Care (Insert Text)  Description  Occupational Therapy Goals  Initiated 7/4/2019  1. Patient will perform grooming standing at sink with contact guard assist within 7 day(s). 2.  Patient will perform lower body dressing including gathering clothing with contact guard assist within 7 day(s). 3.  Patient will perform toilet transfers with contact guard assist within 7 day(s). 4.  Patient will perform all aspects of toileting with contact guard assist within 7 day(s). 5.  Patient will participate in upper extremity therapeutic exercise/activities with independence for 10 minutes within 7 day(s). 6.  Patient will utilize energy conservation techniques during functional activities with verbal and visual cues within 7 day(s). Outcome: Progressing Towards Goal     OCCUPATIONAL THERAPY EVALUATION  Patient: Dolores Garibay (62 y.o. male)  Date: 7/4/2019  Primary Diagnosis: Dizziness [R42]        Precautions:  Fall, WBAT    ASSESSMENT :  Based on the objective data described below, the patient presents with diplopia, R side sensory impairments, decreased endurance, balance with significant LOB to R without ability to self correct and safety following admission for dizziness with MRI positive for [place] CVA. He currently requires up to min A for LE ADLs, toileting and functional mobility. Eye patch provided with education to improve vision and decrease diplopia. Pt and family demonstrated good understanding. Pt is very motivated for increased independence. Recommend IP rehab at discharge. Pt can tolerate at least 3 hours of intense therapy a day and requires OT, PT and SLP services. Patient will benefit from skilled intervention to address the above impairments. Patient?s rehabilitation potential is considered to be Good  Factors which may influence rehabilitation potential include:   ? None noted  ? Mental ability/status  ? Medical condition  ? Home/family situation and support systems  ? Safety awareness  ? Pain tolerance/management  ? Other:      PLAN :  Recommendations and Planned Interventions:  ?                  Self Care Training                  ? Therapeutic Activities  ? Functional Mobility Training    ? Cognitive Retraining  ? Therapeutic Exercises           ? Endurance Activities  ? Balance Training                   ? Neuromuscular Re-Education  ? Visual/Perceptual Training     ? Home Safety Training  ? Patient Education                 ? Family Training/Education  ? Other (comment):    Frequency/Duration: Patient will be followed by occupational therapy 6 times a week to address goals. Discharge Recommendations: Inpatient Rehab  Further Equipment Recommendations for Discharge: TBD      SUBJECTIVE:   Patient stated ? My vision is bad.?    OBJECTIVE DATA SUMMARY:   HISTORY:   Past Medical History:   Diagnosis Date    Benign hypertension     Chest pain, unspecified     CHF (congestive heart failure), NYHA class I, chronic, diastolic (HCC)     Dyspnea     Hypertension     LBBB (left bundle branch block)     Palpitations     Vertigo      Past Surgical History:   Procedure Laterality Date    HX TONSIL AND ADENOIDECTOMY         Prior Level of Function/Environment/Context: Independent, active, drives.   H/o cranial nerve III palsy  Home Situation  Home Environment: Private residence  # Steps to Enter: 4  Rails to Enter: Yes  Hand Rails : Right  One/Two Story Residence: Two story  # of Interior Steps: 12  Interior Rails: Left  Lift Chair Available: No  Living Alone: No  Support Systems: Spouse/Significant Other/Partner, Friends \ neighbors  Patient Expects to be Discharged to[de-identified] Private residence  Current DME Used/Available at Home: [employer] [place], straight, Walker, rolling  Tub or Shower Type: Tub/Shower combination  Hand dominance: Right    EXAMINATION OF PERFORMANCE DEFICITS:  Cognitive/Behavioral Status:  Neurologic State: Alert; Appropriate for age  Orientation Level: Oriented X4  Cognition: Follows commands  Perception: Cues to maintain midline in sitting;Cues to maintain midline in standing; Tactile;Verbal;Visual  Perseveration: No perseveration noted  Safety/Judgement: Awareness of environment;Decreased awareness of need for assistance;Decreased insight into deficits; Decreased awareness of need for safety; Fall prevention    Hearing: Auditory  Auditory Impairment: None    Vision/Perceptual:    Tracking: Able to track stimulus in all quadrants w/o difficulty    Saccades: Within Defined Limits    Convergence: Normal (within 6 inches from nose)       Diplopia: Yes    Acuity: Able to read clock/calendar on wall without difficulty; Able to read employee name badge without difficulty    Corrective Lenses: Glasses    Range of Motion:  AROM: Within functional limits  PROM: Within functional limits                      Strength:  Strength: Within functional limits(BUEs 5/5)                Coordination:  Coordination: Generally decreased, functional  Fine Motor Skills-Upper: Left Impaired;Right Impaired    Gross Motor Skills-Upper: Left Intact; Right Intact    Tone & Sensation:  Tone: Normal  Sensation: Impaired(R hand numbness)                      Balance:  Sitting: Impaired  Sitting - Static: Good (unsupported)  Sitting - Dynamic: Fair (occasional)  Standing: Impaired; With support  Standing - Static: Fair;Constant support  Standing - Dynamic : Fair;Constant support    Functional Mobility and Transfers for ADLs:  Bed Mobility:  Rolling: Supervision  Supine to Sit: Stand-by assistance  Sit to Supine: Stand-by assistance  Scooting: Stand-by assistance    Transfers:  Sit to Stand: Minimum assistance(LOB to R)  Functional Transfers  Bathroom Mobility: Moderate assistance(major LOB to R)  Toilet Transfer : Minimum assistance   Bed to Chair: Minimum assistance(LOB to R)  ADL Assessment:  Feeding: Setup    Oral Facial Hygiene/Grooming: Minimum assistance; Additional time(standing at sink with LOB to R)    Bathing: Minimum assistance; Additional time;Assist x1(major LOB in standing)    Upper Body Dressing: Contact guard assistance; Additional time(seated with LOB posteriorly)    Lower Body Dressing: Additional time;Assist x1; Moderate assistance(A to reach feet and imp balance)    Toileting: Maximum assistance; Additional time;Assist x1(A for clothing due to imp balance)                ADL Intervention and task modifications:  Patient was educated on the benefits of maintaining activity tolerance, functional mobility, and independence with self care tasks during acute stay. Encouraged patient to be out of bed for all meals, perform daily ADLs (as approved by RN/MD regarding bathing etc), performing functional mobility to/from bathroom, and increasing time OOB daily with assist. Patient educated about the importance of maintaining activity tolerance to ensure safe return home and to baseline. Patient verbalized understanding of education. Cognitive Retraining  Safety/Judgement: Awareness of environment;Decreased awareness of need for assistance;Decreased insight into deficits; Decreased awareness of need for safety; Fall prevention      Functional Measure:   Fugl-[place] Assessment of Motor Recovery after Stroke: RUE    Reflex Activity  Flexors/Biceps/Fingers: Can be elicited  Extensors/Triceps: Can be elicited  Reflex Subtotal: 4    Volitional Movement Within Synergies  Shoulder Retraction: Full  Shoulder Elevation: Full  Shoulder Abduction (90 degrees): Full  Shoulder External Rotation: Full  Elbow Flexion: Full  Forearm Supination: Full  Shoulder Adduction/Internal Rotation: Full  Elbow Extension: Full  Forearm Pronation: Full  Subtotal: 18    Volitional Movement Mixing Synergies  Hand to Lumbar Spine: Full  Shoulder Flexion (0-90 degrees): Full  Pronation-Supination: Full  Subtotal: 6    Volitional Movement With Little or No Synergy  Shoulder Abduction (0-90 degrees): Full  Shoulder Flexion ( degrees): Full  Pronation/Supination: Full  Subtotal : 6    Normal Reflex Activity  Biceps, Triceps, Finger Flexors: Full  Subtotal : 2    Upper Extremity Total   Upper Extremity Total: 36    Wrist  Stability at 15 Degree Dorsiflexion: Full  Repeated Dorsiflexion/ Volar Flexion: Full  Stability at 15 Degree Dorsiflexion: Full  Repeated Dorsiflexion/ Volar Flexion: Full  Circumduction: Full  Wrist Total: 10    Hand  Mass Flexion: Full  Mass Extension: Full  Grasp A: Full  Grasp B: Full  Grasp C: Full  Grasp D: Full  Grasp E: Full  Hand Total: 14    Coordination/Speed  Tremor: Slight  Dysmetria: Slight  Time: <1s(BUEs 5 sec each)  Coordination/Speed Total : 4    Total A-D  Total A-D (Motor Function): 64/66     This is a reliable/valid measure of arm function after a neurological event. It has established value to characterize functional status and for measuring spontaneous and therapy-induced recovery; tests proximal and distal motor functions. Fugl-[place] Assessment - UE scores recorded between five and 30 days post neurologic event can be used to predict UE recovery at six months post neurologic event. Severe = 0-21 points   Moderately Severe = 22-33 points   Moderate = 34-47 points   Mild = 48-66 points  GUY Ahn, [place] Hua, & [place] Flores (1992). Measurement of motor recovery after stroke: Outcome assessment and sample size requirements.  Stroke, 23, pp. 2074-0279.   ------------------------------------------------------------------------------------------------------------------------------------------------------------------  MCID:  Stroke:   Anya Torres et al, 2001; n = 171; mean age 79 (6) years; assessed within 17 (12) days of stroke, Acute Stroke)  FMA Motor Scores from Admission to Discharge   10 point increase in FMA Upper Extremity = 1.5 change in discharge FIM   10 point increase in FMA Lower Extremity = 1.9 change in discharge FIM  MDC:   Stroke:   Argenis Leonard et al, 2008, n = 14, mean age = 59.9 (14.6) years, assessed on average 14 (6.5) months post stroke, Chronic Stroke)   FMA = 5.2 points for the Upper Extremity portion of the assessment     Occupational Therapy Evaluation Charge Determination   History Examination Decision-Making   LOW Complexity : Brief history review  HIGH Complexity : 5 or more performance deficits relating to physical, cognitive , or psychosocial skils that result in activity limitations and / or participation restrictions MEDIUM Complexity : Patient may present with comorbidities that affect occupational performnce. Miniml to moderate modification of tasks or assistance (eg, physical or verbal ) with assesment(s) is necessary to enable patient to complete evaluation       Based on the above components, the patient evaluation is determined to be of the following complexity level: LOW     Pain:  Pain Scale 1: Numeric (0 - 10)  Pain Intensity 1: 0              Activity Tolerance:   Fair  Please refer to the flowsheet for vital signs taken during this treatment. After treatment:   ?  Patient left in no apparent distress sitting up in chair  ? Patient left in no apparent distress in bed  ? Call bell left within reach  ? Nursing notified  ? Caregiver present- pt's wife and son  ? Bed alarm activated    COMMUNICATION/EDUCATION:   The patient?s plan of care was discussed with: Physical Therapist, Speech Therapist and Registered Nurse. Patient was educated regarding his deficit(s) of impaired balance, diplopia as this relates to his diagnosis of [place] CVA. He demonstrated Fair understanding as evidenced by awareness of situation.     Patient and/or family was verbally educated on the BE FAST acronym for signs/symptoms of CVA and TIA. Informed patient to refer to the Stroke Binder for further BE FAST information. All questions answered with patient indicating fair understanding. ? Home safety education was provided and the patient/caregiver indicated understanding. ? Patient/family have participated as able in goal setting and plan of care. ?    Patient/family agree to work toward stated goals and plan of care. ?    Patient understands intent and goals of therapy, but is neutral about his/her participation. ? Patient is unable to participate in goal setting and plan of care. This patient?s plan of care is appropriate for delegation to JENNI.     Thank you for this referral.  Luis Rodriges OT  Time Calculation: 29 mins

## 2019-07-04 NOTE — PROGRESS NOTES
Re-evaluated patient swallow. Was able to swallow water by cup as well as with a straw without any coughing.

## 2019-07-05 ENCOUNTER — APPOINTMENT (OUTPATIENT)
Dept: CARDIAC CATH/INVASIVE PROCEDURES | Age: 84
DRG: 065 | End: 2019-07-05
Attending: STUDENT IN AN ORGANIZED HEALTH CARE EDUCATION/TRAINING PROGRAM
Payer: MEDICARE

## 2019-07-05 ENCOUNTER — APPOINTMENT (OUTPATIENT)
Dept: NON INVASIVE DIAGNOSTICS | Age: 84
DRG: 065 | End: 2019-07-05
Attending: SPECIALIST
Payer: MEDICARE

## 2019-07-05 ENCOUNTER — APPOINTMENT (OUTPATIENT)
Dept: NON INVASIVE DIAGNOSTICS | Age: 84
DRG: 065 | End: 2019-07-05
Attending: NURSE PRACTITIONER
Payer: MEDICARE

## 2019-07-05 LAB
ALBUMIN SERPL-MCNC: 3 G/DL (ref 3.5–5)
ANION GAP SERPL CALC-SCNC: 5 MMOL/L (ref 5–15)
ATRIAL RATE: 66 BPM
BACTERIA SPEC CULT: NORMAL
BASOPHILS # BLD: 0 K/UL (ref 0–0.1)
BASOPHILS NFR BLD: 1 % (ref 0–1)
BUN SERPL-MCNC: 26 MG/DL (ref 6–20)
BUN/CREAT SERPL: 21 (ref 12–20)
CALCIUM SERPL-MCNC: 8.2 MG/DL (ref 8.5–10.1)
CALCULATED P AXIS, ECG09: 45 DEGREES
CALCULATED R AXIS, ECG10: 13 DEGREES
CALCULATED T AXIS, ECG11: 111 DEGREES
CC UR VC: NORMAL
CHLORIDE SERPL-SCNC: 110 MMOL/L (ref 97–108)
CO2 SERPL-SCNC: 25 MMOL/L (ref 21–32)
CREAT SERPL-MCNC: 1.21 MG/DL (ref 0.7–1.3)
DIAGNOSIS, 93000: NORMAL
DIFFERENTIAL METHOD BLD: NORMAL
EOSINOPHIL # BLD: 0.2 K/UL (ref 0–0.4)
EOSINOPHIL NFR BLD: 2 % (ref 0–7)
ERYTHROCYTE [DISTWIDTH] IN BLOOD BY AUTOMATED COUNT: 13.2 % (ref 11.5–14.5)
GLUCOSE SERPL-MCNC: 96 MG/DL (ref 65–100)
HCT VFR BLD AUTO: 42.1 % (ref 36.6–50.3)
HGB BLD-MCNC: 14.3 G/DL (ref 12.1–17)
IMM GRANULOCYTES # BLD AUTO: 0 K/UL (ref 0–0.04)
IMM GRANULOCYTES NFR BLD AUTO: 0 % (ref 0–0.5)
LYMPHOCYTES # BLD: 2.2 K/UL (ref 0.8–3.5)
LYMPHOCYTES NFR BLD: 25 % (ref 12–49)
MAGNESIUM SERPL-MCNC: 2.3 MG/DL (ref 1.6–2.4)
MCH RBC QN AUTO: 31.9 PG (ref 26–34)
MCHC RBC AUTO-ENTMCNC: 34 G/DL (ref 30–36.5)
MCV RBC AUTO: 94 FL (ref 80–99)
MONOCYTES # BLD: 0.8 K/UL (ref 0–1)
MONOCYTES NFR BLD: 10 % (ref 5–13)
NEUTS SEG # BLD: 5.4 K/UL (ref 1.8–8)
NEUTS SEG NFR BLD: 62 % (ref 32–75)
NRBC # BLD: 0 K/UL (ref 0–0.01)
NRBC BLD-RTO: 0 PER 100 WBC
P-R INTERVAL, ECG05: 236 MS
PHOSPHATE SERPL-MCNC: 2.6 MG/DL (ref 2.6–4.7)
PLATELET # BLD AUTO: 152 K/UL (ref 150–400)
PMV BLD AUTO: 9.7 FL (ref 8.9–12.9)
POTASSIUM SERPL-SCNC: 3.7 MMOL/L (ref 3.5–5.1)
Q-T INTERVAL, ECG07: 486 MS
QRS DURATION, ECG06: 150 MS
QTC CALCULATION (BEZET), ECG08: 509 MS
RBC # BLD AUTO: 4.48 M/UL (ref 4.1–5.7)
SERVICE CMNT-IMP: NORMAL
SODIUM SERPL-SCNC: 140 MMOL/L (ref 136–145)
VENTRICULAR RATE, ECG03: 66 BPM
WBC # BLD AUTO: 8.7 K/UL (ref 4.1–11.1)

## 2019-07-05 PROCEDURE — 74011250637 HC RX REV CODE- 250/637: Performed by: INTERNAL MEDICINE

## 2019-07-05 PROCEDURE — 80069 RENAL FUNCTION PANEL: CPT

## 2019-07-05 PROCEDURE — 93312 ECHO TRANSESOPHAGEAL: CPT

## 2019-07-05 PROCEDURE — 99152 MOD SED SAME PHYS/QHP 5/>YRS: CPT

## 2019-07-05 PROCEDURE — 97116 GAIT TRAINING THERAPY: CPT

## 2019-07-05 PROCEDURE — 74011000250 HC RX REV CODE- 250: Performed by: STUDENT IN AN ORGANIZED HEALTH CARE EDUCATION/TRAINING PROGRAM

## 2019-07-05 PROCEDURE — 74011250636 HC RX REV CODE- 250/636

## 2019-07-05 PROCEDURE — 85025 COMPLETE CBC W/AUTO DIFF WBC: CPT

## 2019-07-05 PROCEDURE — B246ZZ4 ULTRASONOGRAPHY OF RIGHT AND LEFT HEART, TRANSESOPHAGEAL: ICD-10-PCS | Performed by: STUDENT IN AN ORGANIZED HEALTH CARE EDUCATION/TRAINING PROGRAM

## 2019-07-05 PROCEDURE — 36415 COLL VENOUS BLD VENIPUNCTURE: CPT

## 2019-07-05 PROCEDURE — 65660000000 HC RM CCU STEPDOWN

## 2019-07-05 PROCEDURE — 83735 ASSAY OF MAGNESIUM: CPT

## 2019-07-05 PROCEDURE — 74011250636 HC RX REV CODE- 250/636: Performed by: INTERNAL MEDICINE

## 2019-07-05 PROCEDURE — 97530 THERAPEUTIC ACTIVITIES: CPT

## 2019-07-05 PROCEDURE — C8925 2D TEE W OR W/O FOL W/CON,IN: HCPCS

## 2019-07-05 RX ORDER — MIDAZOLAM HYDROCHLORIDE 1 MG/ML
.5-1 INJECTION, SOLUTION INTRAMUSCULAR; INTRAVENOUS
Status: DISCONTINUED | OUTPATIENT
Start: 2019-07-05 | End: 2019-07-05 | Stop reason: HOSPADM

## 2019-07-05 RX ORDER — FENTANYL CITRATE 50 UG/ML
12.5-1 INJECTION, SOLUTION INTRAMUSCULAR; INTRAVENOUS
Status: DISCONTINUED | OUTPATIENT
Start: 2019-07-05 | End: 2019-07-05 | Stop reason: HOSPADM

## 2019-07-05 RX ORDER — LIDOCAINE HYDROCHLORIDE 20 MG/ML
15 SOLUTION OROPHARYNGEAL AS NEEDED
Status: DISCONTINUED | OUTPATIENT
Start: 2019-07-05 | End: 2019-07-05 | Stop reason: HOSPADM

## 2019-07-05 RX ORDER — LIDOCAINE 50 MG/G
OINTMENT TOPICAL AS NEEDED
Status: DISCONTINUED | OUTPATIENT
Start: 2019-07-05 | End: 2019-07-05 | Stop reason: HOSPADM

## 2019-07-05 RX ORDER — LIDOCAINE HYDROCHLORIDE 20 MG/ML
JELLY TOPICAL
Status: DISCONTINUED | OUTPATIENT
Start: 2019-07-05 | End: 2019-07-05 | Stop reason: HOSPADM

## 2019-07-05 RX ADMIN — LIDOCAINE: 50 OINTMENT TOPICAL at 14:11

## 2019-07-05 RX ADMIN — LIDOCAINE HYDROCHLORIDE 15 ML: 20 SOLUTION ORAL; TOPICAL at 14:20

## 2019-07-05 RX ADMIN — FENTANYL CITRATE 25 MCG: 50 INJECTION, SOLUTION INTRAMUSCULAR; INTRAVENOUS at 14:25

## 2019-07-05 RX ADMIN — SODIUM CHLORIDE 75 ML/HR: 900 INJECTION, SOLUTION INTRAVENOUS at 20:41

## 2019-07-05 RX ADMIN — MIDAZOLAM HYDROCHLORIDE 1 MG: 1 INJECTION, SOLUTION INTRAMUSCULAR; INTRAVENOUS at 14:30

## 2019-07-05 RX ADMIN — SODIUM CHLORIDE 75 ML/HR: 900 INJECTION, SOLUTION INTRAVENOUS at 03:49

## 2019-07-05 RX ADMIN — APIXABAN 5 MG: 5 TABLET, FILM COATED ORAL at 17:43

## 2019-07-05 RX ADMIN — Medication 10 ML: at 06:16

## 2019-07-05 RX ADMIN — APIXABAN 5 MG: 5 TABLET, FILM COATED ORAL at 09:10

## 2019-07-05 RX ADMIN — MIDAZOLAM HYDROCHLORIDE 1 MG: 1 INJECTION, SOLUTION INTRAMUSCULAR; INTRAVENOUS at 14:26

## 2019-07-05 RX ADMIN — LIDOCAINE HYDROCHLORIDE: 20 JELLY TOPICAL at 14:40

## 2019-07-05 NOTE — PROGRESS NOTES
Bedside and Verbal shift change report given to RIVERA Amos (oncoming nurse) by Gera Love (offgoing nurse). Report included the following information SBAR and Kardex.

## 2019-07-05 NOTE — PROGRESS NOTES
Occupational Therapy    Attempted to see patient for OT services. Patient off the floor for RICKY. Will continue to follow for OT. Previous OT noted recommend IPR once medically stable.      Thank you,    Ada Urena, OT

## 2019-07-05 NOTE — PROGRESS NOTES
Cardiology Progress Note       5th floor   NAME:  Jonathan Ortiz   :   1935   MRN:   [id number]     Assessment/Plan:   1. 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 NPO. Discussed plan with pt and spouse and they agree to proceed. 2. HTN  3. Hx splenic infarcts:   4. ? PAF hx : consider ILR but if needs to remain on [address] for splenic infarcts then would not pursue. CARDIOLOGY ATTENDING  Assessment and plan was discussed and agree as written above    RICKY today with Dr. Mustapha Cope. OK for DC from cardiac standpoint and FU with his cardiologist as an outpatient to discuss an ILR. Yakov Cotto MD, [facility]          Subjective:   Jonathan Ortiz is a 82 y.o.   male  with PMH of BPPV, ? A fib , LBBB,  HTN, BPH, splenic infarcts  who presented to the ED with chief complaint  Department complaining of dizziness.  This is part of a constellation of symptoms including nausea/vomiting, R arm numbness and blurry vision.  He has had similar symptoms from December through , treated as BPPV by ENT.  3 days ago presented to urology for testicular swelling, placed on bactrim for UTI.  [facility] has had TIA workup, including unremarkable Carotid US a few years ago, ECHO with only mild dCHF a few months ago, and a normal for age MRI brain last year.         There was a ? Of a fib previously, (had been on eliquis for splenic infarcts) ,  then wore event monitor for 19 days there was no a fib so per patient and wife eliquis was stopped. Sees Dr. Jonh Metz at [facility].        Cardiac ROS: Patient denies any exertional chest pain, dyspnea, palpitations, syncope, orthopnea, edema or paroxysmal nocturnal dyspnea. Previous Cardiac Eval  19   ECHO ADULT COMPLETE 2019    Narrative · Interatrial Septum: Agitated saline contrast study was performed. There   was no shunting at baseline or with Valsalva.   · Left Ventricle: Normal cavity size, wall thickness and systolic function   (ejection fraction normal). Calculated left ventricular ejection fraction   is 55%. No regional wall motion abnormality noted. Signed by: Juan Francisco Elias MD         Review of Systems: No nausea, indigestion, vomiting, pain, cough, sputum. No bleeding. NPO for RICKY. Objective:     Visit Vitals  /50 (BP 1 Location: Left arm, BP Patient Position: At rest)   Pulse (!) 56   Temp 97.8 °F (36.6 °C)   Resp 16   Ht 6' (1.829 m)   Wt 206 lb (93.4 kg)   SpO2 94%   BMI 27.94 kg/m²      O2 Device: Room air    Temp (24hrs), Av °F (36.7 °C), Min:97.6 °F (36.4 °C), Max:98.4 °F (36.9 °C)      No intake/output data recorded.  1901 -  0700  In: 2005 [P.O.:180; I.V.:1825]  Out: 900 [Urine:900]  TELE: SB    General: AAOx3 cooperative, no acute distress. HEENT: Patch over OS. Atraumatic. Pink and moist.  Anicteric sclerae. Neck : Supple. Lungs: CTA bilaterally. No wheezing/rhonchi/rales. Heart: Regular rhythm, no murmur. No JVD. No carotid bruits. Abdomen: Soft, non-distended, non-tender. + Bowel sounds. Extremities: No edema. Neurologic: Grossly intact. Alert and oriented X 3. No acute neurological distress. Psych: Good insight. Not anxious or agitated. Care Plan discussed with:    Comments   Patient x    Family  x spouse   RN x    Care Manager                    Consultant:          Data Review:     No lab exists for component: ITNL   No results for input(s): CPK, CKMB, TROIQ in the last 72 hours.   Recent Labs     19  0343 19  0732 19  194    139 133*   K 3.7 3.8 4.3   * 107 100   CO2 25 25 26   BUN 26* 25* 21*   CREA 1.21 1.32* 1.44*   GLU 96 116* 194*   PHOS 2.6  --   --    MG 2.3 2.6* 2.2   ALB 3.0* 3.2* 3.5   WBC 8.7  --  8.9   HGB 14.3  --  16.6   HCT 42.1  --  48.5     --  156     Recent Labs     19   INR 1.1   PTP 11.4*   APTT 24.2       Medications reviewed  Current Facility-Administered Medications   Medication Dose Route Frequency    sodium chloride (NS) flush 5-40 mL  5-40 mL IntraVENous Q8H    sodium chloride (NS) flush 5-40 mL  5-40 mL IntraVENous PRN    acetaminophen (TYLENOL) tablet 650 mg  650 mg Oral Q4H PRN    Or    acetaminophen (TYLENOL) solution 650 mg  650 mg Per NG tube Q4H PRN    Or    acetaminophen (TYLENOL) suppository 650 mg  650 mg Rectal Q4H PRN    apixaban (ELIQUIS) tablet 5 mg  5 mg Oral BID    0.9% sodium chloride infusion  75 mL/hr IntraVENous CONTINUOUS    acetaminophen (TYLENOL) tablet 650 mg  650 mg Oral Q4H PRN    diphenhydrAMINE (BENADRYL) capsule 25 mg  25 mg Oral Q4H PRN    ondansetron (ZOFRAN) injection 4 mg  4 mg IntraVENous Q4H PRN         Jacklyn Kocher, NP

## 2019-07-05 NOTE — CONSULTS
Requesting Provider: Carin Paula MD - Reason for Consultation: \"testicle pain\"  Pre-existing [facility] Patient:   Jerardo Chisholm                  Patient: Lolita Bergman MRN: [id number]  SSN: [id number]    YOB: 1935  Age: 82 y.o. Sex: male      Location: [facility]       Code Status: Full Code   PCP: Gala Guillaume MD  - [phone]   Emergency Contact:  Primary Emergency Contact: Cesilia Schumacher, Home Phone: [phone]   Race/Tenriism/Language: [facility] / Ruma Deleonswatikarthik / Marques Bailey   Payor: Payor: Monda Ganser / Plan: [address] / Product Type: Medicare /    Prior Admission Data: 4/8/19 [facility] EMERGENCY DEPT     Hospitalized:  Hospital Day: 3 - Admitted 7/3/2019  7:25 PM   POD # * No surgery found *  by * Surgery not found * - Blood Loss: * No surgery found * * Surgery not found *      CONSULTANTS  IP CONSULT TO TELE-NEUROLOGY  IP CONSULT TO NEUROLOGY  IP CONSULT TO UROLOGY  IP CONSULT TO NEUROLOGY  IP CONSULT TO CARDIOLOGY    ADMISSION DIAGNOSES      ICD-10-CM ICD-9-CM   1. Dizziness R42 780.4   2. Cerebrovascular accident (CVA), unspecified mechanism ([facility] [address] I63.9 434.91          Assessment/Plan:      · Orchalgia   -Recommend additional 10 days of abx. If bactrim possible correlation to recent events can treat with doxycycline, fluoroquinolone, of keflex as alternative therapy. -RTC 1 month for symptom check      CC: Dizziness and Vomiting   HPI: He is a 82 y.o. male known to [facility], and Dr. Rosalio Ortiz with history of prostatitis and intermittent testicular pain. He called our office 7/1 with orchalgia c/w prior prostatitis events and was given Rx for Bactrim empirically over the phone. He has previously been treated with and tolerated Bactrim. Testicle pain has improved but . Admitted blurry vision, dizziness, nausea. Underwent transesophageal echo today. Concern for possible intolerance to Bactrim.      UA clean, Cx no significant growth - 2 orgs, WBC normal, and scrotal US normal. Abx on hold.     -------------------------------------------------------------------------__     .     Temp (24hrs), Av °F (36.7 °C), Min:97.8 °F (36.6 °C), Max:98.4 °F (36.9 °C)     Urinary Status: Voiding        Creatinine   Date/Time Value Ref Range Status   2019 03:43 AM 1.21 0.70 - 1.30 MG/DL Final   2019 07:32 AM 1.32 (H) 0.70 - 1.30 MG/DL Final   2019 07:49 PM 1.44 (H) 0.70 - 1.30 MG/DL Final   2019 09:32 PM 1.14 0.70 - 1.30 MG/DL Final   2018 03:29 PM 1.15 0.70 - 1.30 MG/DL Final          Current Antimicrobial Therapy (168h ago, onward)     None              Key Anti-Platelet Anticoagulant Meds      The patient is on no antiplatelet meds or anticoagulants.          Diet: DIET NPO -        Labs            Lab Results   Component Value Date/Time     Lactic acid 1.1 2018 10:58 PM     WBC 8.7 2019 03:43 AM     HCT 42.1 2019 03:43 AM     PLATELET 230  03:43 AM     Sodium 140 2019 03:43 AM     Potassium 3.7 2019 03:43 AM     Chloride 110 (H) 2019 03:43 AM     CO2 25 2019 03:43 AM     BUN 26 (H) 2019 03:43 AM     Creatinine 1.21 2019 03:43 AM     Glucose 96 2019 03:43 AM     Calcium 8.2 (L) 2019 03:43 AM     Magnesium 2.3 2019 03:43 AM     INR 1.1 2019 07:49 PM      UA:         Lab Results   Component Value Date/Time     Color YELLOW/STRAW 2019 11:52 PM     Appearance CLEAR 2019 11:52 PM     Specific gravity 1.010 2019 11:52 PM     Specific gravity 1.015 2019 09:32 PM     pH (UA) 7.0 2019 11:52 PM     Protein NEGATIVE  2019 11:52 PM     Glucose NEGATIVE  2019 11:52 PM     Ketone NEGATIVE  2019 11:52 PM     Bilirubin NEGATIVE  2019 11:52 PM     Urobilinogen 1.0 2019 11:52 PM     Nitrites NEGATIVE  2019 11:52 PM     Leukocyte Esterase NEGATIVE  2019 11:52 PM     Epithelial cells FEW 2019 11:52 PM     Bacteria NEGATIVE  07/03/2019 11:52 PM     WBC 0-4 07/03/2019 11:52 PM     RBC 0-5 07/03/2019 11:52 PM      Imaging           Results for orders placed during the hospital encounter of 07/03/19   CTA CODE NEURO HEAD AND NECK W CONT     Narrative *PRELIMINARY REPORT*  No emergent process. Significant stenosis at the origin of the A1 segment. Stenosis at the origin of the right P1 segment. .  Findings of this exam were discussed with Barbara Pace by Dr. Юлия Alvarez by  telephone at approximately, 7/3/2019 8:04 PM.  789  . Preliminary report was provided by Dr. Юлия Alvarez  Final report to follow. *END PRELIMINARY REPORT*  CLINICAL HISTORY: Dizziness and ataxia     EXAMINATION:  CT ANGIOGRAPHY HEAD AND NECK      COMPARISON: 1/11/2012, CT head 7/3/2019, MR brain 7/3/2019     TECHNIQUE:  Following the uneventful administration of iodinated contrast  material, axial CT angiography of the head and neck was performed. Delayed axial  images through the head were also obtained. Coronal and sagittal reconstructions  were obtained. Manual postprocessing of images was performed. 3-D  Sagittal  maximal intensity projection images were obtained. 3-D Coronal maximal  intensity projections were obtained. CT dose reduction was achieved through use  of a standardized protocol tailored for this examination and automatic exposure  control for dose modulation.      FINDINGS:     Delayed contrast-enhanced head CT:     The ventricles are midline without hydrocephalus. There is no acute intra or  extra-axial hemorrhage. Mild bilateral subcortical and periventricular areas of  patchy low attenuation is nonspecific but likely related to changes of chronic  small vessel ischemic disease. The basal cisterns are clear.   The paranasal  sinuses are clear.     CTA NECK:     Great vessels: Normal arch anatomy with the origins patent.     Right subclavian artery: Patent     Left subclavian artery: Patent     Right common carotid artery: Patent     Left common carotid artery: Patent     Cervical right internal carotid artery: Patent with proximal atherosclerosis and  less than 50% stenosis by NASCET criteria.     Cervical left internal carotid artery: Patent with no significant stenosis by  NASCET criteria.     Right vertebral artery: Patent     Left vertebral artery: Patent     Moderate cervical spondylosis     CTA HEAD:     Right cavernous internal carotid artery: Patent     Left cavernous internal carotid artery: Patent     Anterior cerebral arteries: Patent     Anterior communicating artery: Patent     Right middle cerebral artery: Patent with mild to moderate atherosclerosis and  focal eccentric noncalcified atheroma in the posterior M2 division causing  moderate stenosis.     Left middle cerebral artery: Patent with mild atherosclerosis     Posterior communicating arteries: Patent     Posterior cerebral arteries: Patent with mild to moderate atherosclerosis     Basilar artery: Patent with mild distal atherosclerosis     Distal vertebral arteries: Patent        Impression Impression:     No evidence for acute large vessel arterial occlusion.     Mild to moderate atherosclerosis in the right middle cerebral artery with focal  eccentric noncalcified atheroma in the posterior M2 division causing moderate  stenosis. Other intracranial and extracranial atherosclerosis as described above                                    US Results (most recent):       Results from Hospital Encounter encounter on 07/03/19   US SCROTUM/TESTICLES     Narrative EXAM: US SCROTUM/TESTICLES      INDICATION: Testicular pain.     COMPARISON: None.     TECHNIQUE:  Real-time sonography of the scrotum was performed with a high frequency linear  transducer. Multiple static images were obtained. Color Doppler evaluation was  also performed.     FINDINGS:  RIGHT TESTICLE: The right testicle measures 4.3 x 2.2 x 3.1 cm for a volume of  15.5 mL.  There is positive Doppler flow within the right testicle. There is an  incidental right testicular cyst measuring 4 mm. Vascularity of the right  testicle is normal.     RIGHT EPIDIDYMIS: The right epididymis is normal.     LEFT TESTICLE: The left testicle measures 3.8 x 2.6 x 3.4 cm for a volume of  17.6 mL. Normal vascularity and Doppler flow. Normal echogenicity of the left  testicle.     LEFT EPIDIDYMIS: The left epididymis is normal.        Impression IMPRESSION: No focal suspicious testicular mass, evidence of infection, torsion,  hydrocele or varicocele.            Cultures               All Micro Results      Procedure Component Value Units Date/Time     CULTURE, URINE [[id number]] Collected:  07/03/19 4253     Order Status:  Completed Specimen:  Urine from Clean catch Updated:  07/05/19 2893       Special Requests: NO SPECIAL REQUESTS           [place] Count --           [id number]  COLONIES/mL          Culture result:           >2 ORGANISMS - CONTAMINATED SPECIMEN. SUGGEST RECOLLECTION                      Past History: (Complete 2+/3 areas)           Allergies   Allergen Reactions    Pcn [Penicillins] Unknown (comments)             Current Facility-Administered Medications   Medication Dose Route Frequency    sodium chloride (NS) flush 5-40 mL  5-40 mL IntraVENous Q8H    sodium chloride (NS) flush 5-40 mL  5-40 mL IntraVENous PRN    acetaminophen (TYLENOL) tablet 650 mg  650 mg Oral Q4H PRN     Or    acetaminophen (TYLENOL) solution 650 mg  650 mg Per NG tube Q4H PRN     Or    acetaminophen (TYLENOL) suppository 650 mg  650 mg Rectal Q4H PRN    apixaban (ELIQUIS) tablet 5 mg  5 mg Oral BID    0.9% sodium chloride infusion  75 mL/hr IntraVENous CONTINUOUS    acetaminophen (TYLENOL) tablet 650 mg  650 mg Oral Q4H PRN    diphenhydrAMINE (BENADRYL) capsule 25 mg  25 mg Oral Q4H PRN    ondansetron (ZOFRAN) injection 4 mg  4 mg IntraVENous Q4H PRN             Prior to Admission medications    Medication Sig Start Date End Date Taking? Authorizing Provider   propranolol (INDERAL) 10 mg tablet Take 5 mg by mouth two (2) times a day.     Yes Provider, Historical   magnesium oxide (MAG-OX) 400 mg tablet Take 400 mg by mouth daily.     Yes Provider, Historical   cyanocobalamin 1,000 mcg tablet Take 1,000 mcg by mouth daily.     Yes Provider, Historical   ascorbic acid, vitamin C, (VITAMIN C) 500 mg tablet Take 500 mg by mouth daily.     Yes Provider, Historical   acetaminophen (TYLENOL) 325 mg tablet Take 2 Tabs by mouth every six (6) hours as needed. 12/12/18   Yes Fausto Chun MD          PMHx:  has a past medical history of Benign hypertension, Chest pain, unspecified, CHF (congestive heart failure), NYHA class I, chronic, diastolic ([address] Dyspnea, Hypertension, LBBB (left bundle branch block), Palpitations, and Vertigo. PSurgHx:  has a past surgical history that includes hx tonsil and adenoidectomy. PSocHx:  reports that he has never smoked. He has never used smokeless tobacco. He reports that he does not drink alcohol or use drugs.      Physical Exam: (Comprehesive - 8+ 1995 Systems)    NAD  nonlabored breathing  abd soft, nontender, nondistended  Normal uncirc phallus, no masses or tenderness of right testicle/epididymis, left testicle is tender to palpation, no mass.    (1) Constitutional:   FIO2:   on SpO2: O2 Sat (%): 94 %  O2 Device: Room air    Patient Vitals for the past 24 hrs:    BP Temp Pulse Resp SpO2 Height Weight   07/05/19 0841 135/67 97.8 °F (36.6 °C) (!) 51 16 94 %     07/05/19 0702   (!) 56       07/05/19 0316 106/50 97.8 °F (36.6 °C) (!) 53 16 94 %     07/04/19 2248 119/46 97.9 °F (36.6 °C) 62 16 95 %     07/04/19 2139   (!) 53       07/04/19 1905 109/50 98 °F (36.7 °C) (!) 54 16 93 %     07/04/19 1536 113/64 98.4 °F (36.9 °C) (!) 52 16 94 %     07/04/19 1500   (!) 55       07/04/19 1222 110/57 98.2 °F (36.8 °C) (!) 51 16 93 %     07/04/19 1139 134/72     6' (1.829 m) 93.4 kg (206 lb) 07/04/19 1107 134/72  (!) 54       07/04/19 1056 101/55  (!) 50  94 %                    Date 07/04/19 0700 - 07/05/19 0659 07/05/19 0700 - 07/06/19 0659   Shift 6887-5157 7236-7282 24 Hour Total 4803-1293 1866-0415 24 Hour Total   INTAKE   P.O. 180   180           P.O. 180   180         I. V.(mL/kg/hr)   825(0.7) 825(0.4)           I. V.   825 825         Shift Total(mL/kg) 180(1.9) 825(8.8) 1005(10.8)         OUTPUT   Urine(mL/kg/hr) 300(0.3) 500(0.4) 800(0.4)           Urine Voided 300 500 800         Shift Total(mL/kg) 300(3.2) 500(5.4) 800(8.6)         NET -120 325 205         Weight (kg) 93.4 93.4 93.4 93.4 93.4 93.4

## 2019-07-05 NOTE — PROGRESS NOTES
[facility] [address]  [address] [facility], [address] [place] [address]  [phone]      Medical Progress Note      NAME: Gladys Yuen   :  1935  MRM:  [id number]    Date/Time: 2019        Assessment / Plan:       Acute CVA: Question embolic in etiology. Presenting symptoms of blurry vision, dizziness, nausea & vomiting, and right arm numbness. MRI showed small left pontine and left midbrain acute infarctions. CTA showed no evidence for acute large vessel arterial occlusion, but mild to moderate atherosclerosis in the right middle cerebral artery with focal eccentric noncalcified atheroma in the posterior M2 division causing moderate stenosis, and other intracranial and extracranial atherosclerosis. Lengthy discussion held with cardiology and neurology, and with family, with the recommendation of starting on Eliquis. 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 TTE showed no shunting at baseline or with valsalva. RICKY pending today. If he is stable after RICKY today, barring any abnormalities, he can be discharged from the hospital, pending inpatient rehab placement. I did tell his son that is to not drive for 6 months post CVA per DMV rules. Benign hypertension (): hold propranolol due to bradycardia. BP controlled. If he needs an antihypertensive for BP control, would probably not use a BB      Prostate enlargement: followed by urology. Recently seen by urology who started on Bactrim and took for 3 days. Had testicular pain but now resolved. Scrotal US negative for testicular mass, evidence of infection, torsion, hydrocele or varicocele. UA here is negative. No indication for abx      LBBB (left bundle branch block) (): chronic    Total time spent: 35 minutes, d/w wife at bedside. Later called son to discuss everything in detail.  All questions answered to their satisfaction  Time spent in the care of this patient including reviewing records, discussing with nursing and/or other providers on the treatment team, obtaining history and examining the patient, and discussing treatment plans. Care Plan discussed with: Patient, Nursing Staff and >50% of time spent in counseling and coordination of care    Discussed:  Care Plan and D/C Planning    Prophylaxis:  Eliquis    Disposition:  [facility]/Rehab         Subjective:     Chief Complaint:  Follow up CVA    Chart/notes/labs/studies reviewed, patient examined at bedside. Feels fine. Not much dizziness at rest. +double vision. No CP or SOB         Objective:       Vitals:        Last 24hrs VS reviewed since prior progress note. Most recent are:    Visit Vitals  /74 (BP 1 Location: Left arm, BP Patient Position: At rest)   Pulse (!) 50   Temp 97.4 °F (36.3 °C)   Resp 16   Ht 6' (1.829 m)   Wt 93.4 kg (206 lb)   SpO2 94%   BMI 27.94 kg/m²     SpO2 Readings from Last 6 Encounters:   07/05/19 94%   04/08/19 95%   12/26/18 95%   12/24/18 94%   12/12/18 94%   03/12/13 93%            Intake/Output Summary (Last 24 hours) at 7/5/2019 1446  Last data filed at 7/5/2019 0617  Gross per 24 hour   Intake 945 ml   Output 750 ml   Net 195 ml          Exam:     Physical Exam:    Gen:  Elderly, chronically ill-appearing. NAD  HEENT:  Sclerae nonicteric, hearing intact to voice, mucous membranes moist  Neck:  Supple, without masses. Resp:  No accessory muscle use, CTAB without wheezes, rales, or rhonchi  Card: HR 50s, without m/r/g. No LE edema. Abd:  +bowel sounds, soft, NTTP, nondistended. No HSM. Neuro: Face symmetric, CN intact. No focal weaknessl. Speech fluent, follows commands appropriately  Psych:  Alert, oriented x 3.  Fair insight     Medications Reviewed: (see below)    Lab Data Reviewed: (see below)    ______________________________________________________________________    Medications:     Current Facility-Administered Medications   Medication Dose Route Frequency    lidocaine (XYLOCAINE) 2 % viscous solution 15 mL  15 mL Mouth/Throat PRN    fentaNYL citrate (PF) injection 12.5-100 mcg  12.5-100 mcg IntraVENous Multiple    midazolam (VERSED) injection 0.5-10 mg  0.5-10 mg IntraVENous Multiple    benzocaine (HURRICANE) 20 % spray   Mucous Membrane PRN    lidocaine (XYLOCAINE) 5 % ointment   Topical PRN    lidocaine (URO-JET/ GLYDO) 2 % jelly   Other Multiple    sodium chloride (NS) flush 5-40 mL  5-40 mL IntraVENous Q8H    sodium chloride (NS) flush 5-40 mL  5-40 mL IntraVENous PRN    acetaminophen (TYLENOL) tablet 650 mg  650 mg Oral Q4H PRN    Or    acetaminophen (TYLENOL) solution 650 mg  650 mg Per NG tube Q4H PRN    Or    acetaminophen (TYLENOL) suppository 650 mg  650 mg Rectal Q4H PRN    apixaban (ELIQUIS) tablet 5 mg  5 mg Oral BID    0.9% sodium chloride infusion  75 mL/hr IntraVENous CONTINUOUS    acetaminophen (TYLENOL) tablet 650 mg  650 mg Oral Q4H PRN    diphenhydrAMINE (BENADRYL) capsule 25 mg  25 mg Oral Q4H PRN    ondansetron (ZOFRAN) injection 4 mg  4 mg IntraVENous Q4H PRN            Lab Review:     Recent Labs     07/05/19 0343 07/03/19 1949   WBC 8.7 8.9   HGB 14.3 16.6   HCT 42.1 48.5    156     Recent Labs     07/05/19  0343 07/04/19  0732 07/03/19 1949    139 133*   K 3.7 3.8 4.3   * 107 100   CO2 25 25 26   GLU 96 116* 194*   BUN 26* 25* 21*   CREA 1.21 1.32* 1.44*   CA 8.2* 8.6 8.6   MG 2.3 2.6* 2.2   PHOS 2.6  --   --    ALB 3.0* 3.2* 3.5   SGOT  --  23 27   ALT  --  26 31   INR  --   --  1.1     No components found for: GLPOC  No results for input(s): PH, PCO2, PO2, HCO3, FIO2 in the last 72 hours. Recent Labs     07/03/19 1949   INR 1.1     No results found for: SDES  Lab Results   Component Value Date/Time    Culture result:  07/03/2019 11:52 PM     >2 ORGANISMS - CONTAMINATED SPECIMEN.  SUGGEST RECOLLECTION              ___________________________________________________    Attending Physician: Lindy Escobedo MD

## 2019-07-05 NOTE — PROGRESS NOTES
Speech pathology note  Reviewed chart and note patient NPO for RICKY today at 1345. Will continue to follow for dysphagia treatment as patient cleared for PO intake. Thank you.     Cindi Trivedi, Elsie Chambers., CCC-SLP

## 2019-07-05 NOTE — PROGRESS NOTES
1450    TRANSFER - IN REPORT:    Verbal report received from Melani Snyder RN (name) on Melania Rubalcava  being received from [facility] (unit) for routine progression of care      Report consisted of patients Situation, Background, Assessment and   Recommendations(SBAR). Information from the following report(s) Procedure Summary was reviewed with the receiving nurse. Opportunity for questions and clarification was provided. Assessment completed upon patients arrival to unit and care assumed. 3:49 PM    TRANSFER - OUT REPORT:    Verbal report given to [facility] floor RN (name) on Melania Rubalcava  being transferred to [facility] (unit) for routine progression of care       Report consisted of patients Situation, Background, Assessment and   Recommendations(SBAR). Information from the following report(s) SBAR and Procedure Summary was reviewed with the receiving nurse. Lines:   Peripheral IV 07/05/19 Left Antecubital (Active)   Site Assessment Clean, dry, & intact 7/5/2019  3:01 PM   Phlebitis Assessment 0 7/5/2019  3:01 PM   Infiltration Assessment 0 7/5/2019  3:01 PM   Dressing Status Clean 7/5/2019  3:01 PM   Dressing Type Transparent 7/5/2019  3:01 PM   Hub Color/Line Status Blue 7/5/2019  3:01 PM        Opportunity for questions and clarification was provided.       Patient transported with:   Monitor  Registered Nurse

## 2019-07-05 NOTE — PROGRESS NOTES
7/5/2019  Reason for Admission:   CVA                  RRAT Score:     20             Do you (patient/family) have any concerns for transition/discharge? None voiced                 Plan for utilizing home health:   No    Current Advanced Directive/Advance Care Plan:  Full code; Adv. Care Plan not on file. Wife, Teddy Morfin (uses pt's cell [phone])  [employer] [place] ([phone])  [employer] ([phone])  2 daughters; one near [place] and another in [facility]      7/5/2019  CARE MANAGEMENT NOTE:  CM is following pt for initial discharge planning. EMR reviewed. CM met with pt's wife, Teddy Morfin (uses pt's cell [phone]) who provided hx for this needs assessment as pt was resting. Reportedly, pt resides with his wife in a two story home with bedroom on the second floor of the home. There are four front steps and 12 stairs between levels. PTA, pt was ambulatory without any assistive device and he has been indepn with ADLs. Pt also provides his own transportation. He has RX drug coverage and he uses [address] in [place]. Pt does not have home healthcare currently. DME in the home includes a cane, rolling walker, and oxygen concentrator. PCP is Dr. Henry Infante. Transition of Care Plan:    1. CM discussed rehab options to include inpt, SNF and HH. A choice letter was signed and placed into pt's chart. [facility] is first choice followed by [facility] then [email or web address]. 2  CM made a referral to [facility] rehab and await recs and determination re: acceptance. 3.  CM called admissions liaison at 4:10 p.m pt is still being reviewed. Will continue to follow for definitive discharge plan.   Aries

## 2019-07-05 NOTE — PROGRESS NOTES
Problem: Mobility Impaired (Adult and Pediatric)  Goal: *Acute Goals and Plan of Care (Insert Text)  Description  Physical Therapy Goals  Initiated 7/4/2019  1. Patient will move from supine to sit and sit to supine  in bed with contact guard assist within 7 day(s). 2.  Patient will transfer from bed to chair and chair to bed with contact guard assist using the least restrictive device within 7 day(s). 3.  Patient will perform sit to stand with contact guard assist within 7 day(s). 4.  Patient will ambulate with contact guard assist for 150 feet with the least restrictive device within 7 day(s). 5.  Patient will ascend/descend 4 stairs with 2 handrail(s) with minimal assistance/contact guard assist within 7 day(s). 6.  Patient will improve Cruz Balance score by 7 points within 7 days. Note:   PHYSICAL THERAPY TREATMENT  Patient: Ofe Smart (39 y.o. male)  Date: 7/5/2019  Diagnosis: Dizziness [R42]  Acute CVA (cerebrovascular accident) [facility]) [I63.9] Dizziness       Precautions: Fall, WBAT  Chart, physical therapy assessment, plan of care and goals were reviewed. ASSESSMENT:  Pt comes to sit with CGA eye patch on right eye. Pt CGA to stand. Pt ambulated 130ft with a cane CGA to min assist  and cues to decrease sofie. Pt Pt was unsteady at times and is at increased risk for falls. Pt CGA back to bed. Continue goals. Progression toward goals:  ?    Improving appropriately and progressing toward goals  ? Improving slowly and progressing toward goals  ? Not making progress toward goals and plan of care will be adjusted     PLAN:  Patient continues to benefit from skilled intervention to address the above impairments. Continue treatment per established plan of care.   Discharge Recommendations: Rehab  Further Equipment Recommendations for Discharge:  Rehab      SUBJECTIVE:       OBJECTIVE DATA SUMMARY:   Critical Behavior:  Neurologic State: Alert  Orientation Level: Oriented X4  Cognition: Follows commands, Appropriate for age attention/concentration, Appropriate decision making, Appropriate safety awareness  Safety/Judgement: Awareness of environment, Decreased awareness of need for assistance, Decreased insight into deficits, Decreased awareness of need for safety, Fall prevention  Functional Mobility Training:  Bed Mobility:     Supine to Sit: Contact guard assistance  Sit to Supine: Contact guard assistance           Transfers:  Sit to Stand: Contact guard assistance  Stand to Sit: Contact guard assistance                             Balance:  Sitting: Intact  Ambulation/Gait Training:  Distance (ft): 130 Feet (ft)  Assistive Device: Cane, straight  Ambulation - Level of Assistance: Contact guard assistance        Gait Abnormalities: Decreased step clearance;Trunk sway increased; Path deviations                                  Pain:  Pain Scale 1: Numeric (0 - 10)  Pain Intensity 1: 0              Activity Tolerance:   Pt tolerated treatment well. Please refer to the flowsheet for vital signs taken during this treatment. After treatment:   ?    Patient left in no apparent distress sitting up in chair  ? Patient left in no apparent distress in bed  ? Call bell left within reach  ? Nursing notified  ? Caregiver present  ?     Bed alarm activated    COMMUNICATION/COLLABORATION:   The patients plan of care was discussed with: Physical Therapist    Stacia Barnes PTA   Time Calculation: 23 mins

## 2019-07-05 NOTE — PROGRESS NOTES
TRANSFER - OUT REPORT:    Verbal report given to [address], RN on [employer] being transferred to [facility]) for routine progression of care       Report consisted of patient's Situation, Background, Assessment and   Recommendations(SBAR). Information from the following report(s) Procedure Summary was reviewed with the receiving nurse. Opportunity for questions and clarification was provided.       Patient transported with:   Registered Nurse  Tech

## 2019-07-05 NOTE — PROGRESS NOTES
Patient having RICKY today. RN reports no dysarthria and took meds without issues this am. SLP will follow PRN.

## 2019-07-05 NOTE — PROGRESS NOTES
Stable for discharge pending inpatient rehab placement / insurance authorization, if he remains stable post RICKY. Lengthy discussion with pt/wife, and later son on phone. Will need Rx for Eliquis.

## 2019-07-05 NOTE — PROGRESS NOTES
Neurology Hospital Progress Note    Patient ID:  Corie Ness  [id number]  82 y.o.  1935      Subjective:   History:  Corie Ness is a 82 y.o. 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 who yesterday developed nausea/vomiting/dizziness and R arm numbness prompting admission to the hospital. 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 Patient is doing better today with no new event. Started on Eliquis. Scheduled for RICKY this afternoon. ROS:  Per HPI-  Otherwise the remainder of the review of system was negative      Social Hx:  Social History     Socioeconomic History    Marital status:      Spouse name: Not on file    Number of children: Not on file    Years of education: Not on file    Highest education level: Not on file   Tobacco Use    Smoking status: Never Smoker    Smokeless tobacco: Never Used   Substance and Sexual Activity    Alcohol use: No    Drug use: Never       Meds:  No current facility-administered medications on file prior to encounter. Current Outpatient Medications on File Prior to Encounter   Medication Sig Dispense Refill    propranolol (INDERAL) 10 mg tablet Take 5 mg by mouth two (2) times a day.  magnesium oxide (MAG-OX) 400 mg tablet Take 400 mg by mouth daily.  cyanocobalamin 1,000 mcg tablet Take 1,000 mcg by mouth daily.  ascorbic acid, vitamin C, (VITAMIN C) 500 mg tablet Take 500 mg by mouth daily.  acetaminophen (TYLENOL) 325 mg tablet Take 2 Tabs by mouth every six (6) hours as needed. Imaging:    CT Results (recent):  Results from Hospital Encounter encounter on 07/03/19   CTA CODE NEURO HEAD AND NECK W CONT    Narrative *PRELIMINARY REPORT*  No emergent process.  Significant stenosis at the origin of the A1 segment. Stenosis at the origin of the right P1 segment. .  Findings of this exam were discussed with Reno Mccabe by Dr. Fela Martinez by  telephone at approximately, 7/3/2019 8:04 PM.  [phone]  . Preliminary report was provided by Dr. Fela Martinez  Final report to follow. *END PRELIMINARY REPORT*  CLINICAL HISTORY: Dizziness and ataxia    EXAMINATION:  CT ANGIOGRAPHY HEAD AND NECK     COMPARISON: 1/11/2012, CT head 7/3/2019, MR brain 7/3/2019    TECHNIQUE:  Following the uneventful administration of iodinated contrast  material, axial CT angiography of the head and neck was performed. Delayed axial  images through the head were also obtained. Coronal and sagittal reconstructions  were obtained. Manual postprocessing of images was performed. 3-D  Sagittal  maximal intensity projection images were obtained. 3-D Coronal maximal  intensity projections were obtained. CT dose reduction was achieved through use  of a standardized protocol tailored for this examination and automatic exposure  control for dose modulation. FINDINGS:    Delayed contrast-enhanced head CT:    The ventricles are midline without hydrocephalus. There is no acute intra or  extra-axial hemorrhage. Mild bilateral subcortical and periventricular areas of  patchy low attenuation is nonspecific but likely related to changes of chronic  small vessel ischemic disease. The basal cisterns are clear. The paranasal  sinuses are clear. CTA NECK:    Great vessels: Normal arch anatomy with the origins patent. Right subclavian artery: Patent    Left subclavian artery: Patent    Right common carotid artery: Patent    Left common carotid artery: Patent    Cervical right internal carotid artery: Patent with proximal atherosclerosis and  less than 50% stenosis by NASCET criteria. Cervical left internal carotid artery: Patent with no significant stenosis by  NASCET criteria.     Right vertebral artery: Patent    Left vertebral artery: Patent    Moderate cervical spondylosis    CTA HEAD:    Right cavernous internal carotid artery: Patent    Left cavernous internal carotid artery: Patent    Anterior cerebral arteries: Patent    Anterior communicating artery: Patent    Right middle cerebral artery: Patent with mild to moderate atherosclerosis and  focal eccentric noncalcified atheroma in the posterior M2 division causing  moderate stenosis. Left middle cerebral artery: Patent with mild atherosclerosis    Posterior communicating arteries: Patent    Posterior cerebral arteries: Patent with mild to moderate atherosclerosis    Basilar artery: Patent with mild distal atherosclerosis    Distal vertebral arteries: Patent      Impression Impression:    No evidence for acute large vessel arterial occlusion. Mild to moderate atherosclerosis in the right middle cerebral artery with focal  eccentric noncalcified atheroma in the posterior M2 division causing moderate  stenosis. Other intracranial and extracranial atherosclerosis as described above                         MRI Results (recent):  Results from Hospital Encounter encounter on 07/03/19   MRI BRAIN WO CONT    Narrative *PRELIMINARY REPORT*  Restricted diffusion to the left of midline in the [place] suggesting ischemia. Preliminary report was provided by Dr. Idania Mccord  Final report to follow. *END PRELIMINARY REPORT*  INDICATION:  Dizziness, ataxia     COMPARISON:  CT head parts from yesterday, MR brain 1/4/2019    TECHNIQUE:  MR imaging of the brain was performed with sagittal T1, axial T1,  T2, FLAIR, GRE, DWI/ADC;     FINDINGS:      The ventricles are midline without hydrocephalus. There is no acute intra or extra-axial fluid collection. Mild focus of  restricted diffusion in the left [place] and small focus of diffusion signal in the  left midbrain compatible with acute infarctions.  Mild to moderate  periventricular and scattered bilateral subcortical increased T2/flair signal  abnormalities are nonspecific but likely represent changes of chronic small  vessel ischemic disease. The major intracranial vascular flow-voids are patent. No abnormal signal in the mastoid air cells or visualized paranasal sinuses. Visualized soft tissues unremarkable. Impression IMPRESSION:    Small left pontine and left midbrain acute infarctions. Mild to moderate white matter signal abnormalities likely represent changes of  chronic small vessel ischemic disease      Findings discussed with patient's nurse Shari at 12:34 PM 7/4/2019                IR Results (recent):  No results found for this or any previous visit. VAS/US Results (recent):  Results from Hospital Encounter encounter on 04/06/15   DUPLEX CAROTID BILATERAL       Reviewed records in [facility] and media tab today    Lab Review     Admission on 07/03/2019   No results displayed because visit has over 200 results. Admission on 04/08/2019, Discharged on 04/08/2019   Component Date Value Ref Range Status    WBC 04/08/2019 8.3  4.1 - 11.1 K/uL Final    RBC 04/08/2019 5.19  4. 10 - 5.70 M/uL Final    HGB 04/08/2019 16.7  12.1 - 17.0 g/dL Final    HCT 04/08/2019 49.1  36.6 - 50.3 % Final    MCV 04/08/2019 94.6  80.0 - 99.0 FL Final    MCH 04/08/2019 32.2  26.0 - 34.0 PG Final    MCHC 04/08/2019 34.0  30.0 - 36.5 g/dL Final    RDW 04/08/2019 12.7  11.5 - 14.5 % Final    PLATELET 30/41/0483 336  150 - 400 K/uL Final    MPV 04/08/2019 9.7  8.9 - 12.9 FL Final    NRBC 04/08/2019 0.0  0  WBC Final    ABSOLUTE NRBC 04/08/2019 0.00  0.00 - 0.01 K/uL Final    NEUTROPHILS 04/08/2019 49  32 - 75 % Final    LYMPHOCYTES 04/08/2019 39  12 - 49 % Final    MONOCYTES 04/08/2019 9  5 - 13 % Final    EOSINOPHILS 04/08/2019 2  0 - 7 % Final    BASOPHILS 04/08/2019 1  0 - 1 % Final    IMMATURE GRANULOCYTES 04/08/2019 0  0.0 - 0.5 % Final    ABS. NEUTROPHILS 04/08/2019 4.0  1.8 - 8.0 K/UL Final    ABS.  LYMPHOCYTES 04/08/2019 3.2  0.8 - 3.5 K/UL Final    ABS. MONOCYTES 04/08/2019 0.8  0.0 - 1.0 K/UL Final    ABS. EOSINOPHILS 04/08/2019 0.2  0.0 - 0.4 K/UL Final    ABS. BASOPHILS 04/08/2019 0.1  0.0 - 0.1 K/UL Final    ABS. IMM. GRANS. 04/08/2019 0.0  0.00 - 0.04 K/UL Final    DF 04/08/2019 AUTOMATED    Final    Sodium 04/08/2019 138  136 - 145 mmol/L Final    Potassium 04/08/2019 3.7  3.5 - 5.1 mmol/L Final    Chloride 04/08/2019 105  97 - 108 mmol/L Final    CO2 04/08/2019 26  21 - 32 mmol/L Final    Anion gap 04/08/2019 7  5 - 15 mmol/L Final    Glucose 04/08/2019 88  65 - 100 mg/dL Final    BUN 04/08/2019 24* 6 - 20 MG/DL Final    Creatinine 04/08/2019 1.14  0.70 - 1.30 MG/DL Final    BUN/Creatinine ratio 04/08/2019 21* 12 - 20   Final    GFR est AA 04/08/2019 >60  >60 ml/min/1.73m2 Final    GFR est non-AA 04/08/2019 >60  >60 ml/min/1.73m2 Final    Comment: Estimated GFR is calculated using the IDMS-traceable Modification of Diet in Renal Disease (MDRD) Study equation, reported for both  Americans (GFRAA) and non- Americans (GFRNA), and normalized to 1.73m2 body surface area. The physician must decide which value applies to the patient. The MDRD study equation should only be used in individuals age 25 or older. It has not been validated for the following: pregnant women, patients with serious comorbid conditions, or on certain medications, or persons with extremes of body size, muscle mass, or nutritional status.  Calcium 04/08/2019 9.1  8.5 - 10.1 MG/DL Final    Bilirubin, total 04/08/2019 0.8  0.2 - 1.0 MG/DL Final    ALT (SGPT) 04/08/2019 24  12 - 78 U/L Final    AST (SGOT) 04/08/2019 21  15 - 37 U/L Final    Alk.  phosphatase 04/08/2019 123* 45 - 117 U/L Final    Protein, total 04/08/2019 7.2  6.4 - 8.2 g/dL Final    Albumin 04/08/2019 3.8  3.5 - 5.0 g/dL Final    Globulin 04/08/2019 3.4  2.0 - 4.0 g/dL Final    A-G Ratio 04/08/2019 1.1  1.1 - 2.2   Final    Lipase 04/08/2019 138  73 - 393 U/L Final  Color 04/08/2019 YELLOW/STRAW    Final    Color Reference Range: Straw, Yellow or Dark Yellow    Appearance 04/08/2019 CLEAR  CLEAR   Final    Specific gravity 04/08/2019 1.015  1.003 - 1.030   Final    pH (UA) 04/08/2019 6.0  5.0 - 8.0   Final    Protein 04/08/2019 NEGATIVE   NEG mg/dL Final    Glucose 04/08/2019 NEGATIVE   NEG mg/dL Final    Ketone 04/08/2019 NEGATIVE   NEG mg/dL Final    Bilirubin 04/08/2019 NEGATIVE   NEG   Final    Blood 04/08/2019 NEGATIVE   NEG   Final    Urobilinogen 04/08/2019 0.2  0.2 - 1.0 EU/dL Final    Nitrites 04/08/2019 NEGATIVE   NEG   Final    Leukocyte Esterase 04/08/2019 NEGATIVE   NEG   Final    WBC 04/08/2019 0-4  0 - 4 /hpf Final    RBC 04/08/2019 0-5  0 - 5 /hpf Final    Epithelial cells 04/08/2019 FEW  FEW /lpf Final    Epithelial cell category consists of squamous cells and /or transitional urothelial cells. Renal tubular cells, if present, are separately identified as such.  Bacteria 04/08/2019 NEGATIVE   NEG /hpf Final    Hyaline cast 04/08/2019 0-2  0 - 5 /lpf Final    Urine culture hold 04/08/2019 URINE ON HOLD IN MICROBIOLOGY DEPT FOR 3 DAYS. IF UNPRESERVED URINE IS SUBMITTED, IT CANNOT BE USED FOR ADDITIONAL TESTING AFTER 24 HRS, RECOLLECTION WILL BE REQUIRED.     Final    Ventricular Rate 04/08/2019 55  BPM Final    Atrial Rate 04/08/2019 55  BPM Final    P-R Interval 04/08/2019 216  ms Final    QRS Duration 04/08/2019 140  ms Final    Q-T Interval 04/08/2019 472  ms Final    QTC Calculation (Bezet) 04/08/2019 451  ms Final    Calculated P Axis 04/08/2019 56  degrees Final    Calculated R Axis 04/08/2019 0  degrees Final    Calculated T Axis 04/08/2019 82  degrees Final    Diagnosis 04/08/2019    Final                    Value:Sinus bradycardia with sinus arrhythmia with 1st degree AV block  Left bundle branch block  Abnormal ECG  When compared with ECG of 26-DEC-2018 15:39,  No significant change was found  Confirmed by Shania Walls MD., Denise ([id number]) on 4/9/2019 2:30:40 PM      SAMPLES BEING HELD 04/08/2019 RED,BLUE,GOLD,GRN   Final    COMMENT 04/08/2019 Add-on orders for these samples will be processed based on acceptable specimen integrity and analyte stability, which may vary by analyte. Final         Objective:       Exam:  Visit Vitals  /67 (BP 1 Location: Left arm, BP Patient Position: At rest)   Pulse (!) 51   Temp 97.8 °F (36.6 °C)   Resp 16   Ht 6' (1.829 m)   Wt 93.4 kg (206 lb)   SpO2 94%   BMI 27.94 kg/m²     Gen: Awake, alert, follows commands  Appropriate appearance, normal speech. Oriented to all spheres. No visual field defect on confrontation exam.  (+) eye patch  Normal gag and swallow. All remaining cranial nerves were normal  Motor function: 5/5 in all extremities  Sensory: intact to LT, PP and JPS  Good FTN and HTS   Gait: Deferred    Assessment:     1. Dizziness    2. Cerebrovascular accident (CVA), unspecified mechanism ([address]        L pontine stroke        Plan:   1. Cardiology seen by patient and is doing a RICKY  2. Continue Eliquis 5 mg BID  3. LDL 57.8  4. Keep SBP < 140  5. Physical therapy  6. Needs to follow up with ophthalmologist regarding his eye issues    Please call for questions    35 mins of time spent, 50% of which was spent on counseling and coordination of care.       Carmelita Gerber MD  Diplomate, American Board of Psychiatry and Neurology  Diplomate, Neuromuscular Medicine  Diplomate, American Board of Electrodiagnostic Medicine

## 2019-07-05 NOTE — PROGRESS NOTES
Bedside shift change report given to Cristina Oleary RN (oncoming nurse) by Clif Mg RN (offgoing nurse). Report included the following information SBAR, Kardex, Procedure Summary, Intake/Output, MAR and Recent Results.

## 2019-07-05 NOTE — PROCEDURES
BRIEF PROCEDURE NOTE    Date of Procedure: 7/5/2019   Preoperative Diagnosis: embolic cva  Postoperative Diagnosis: same    Procedure:RICKY  Cardiologist: Bernie Redd DO  Anesthesia: local (benzocaine spray, Viscous lidocaine gargle) + IV sedation (Midazolam , Fentanyl )  Estimated Blood Loss: Minimal    Informed consent obtained prior to procedure. Appropriate time out performed. Findings:     MV: normal, mild-moderate MR  TV:  Normal, trace TR  AV:  Normal, trace AI  PV:  Not well visualized    LA:  Normal DILLON - no thrombus  RA: Normal  RV: Normal rv function  LV:  Normal LV function  LVEF: 55-60%    Aorta : Atheroma- no significant    Complications:    None, no oropharyngeal trauma or bleeding, vital signs stable at end of procedure.

## 2019-07-05 NOTE — CONSULTS
COURTESY NOTE, NO CHARGE SUBMITTED AS PATIENT WAS NOT SEEN. Requesting Provider: Jackie Pereira MD - Reason for Consultation: \"testicle pain\"  Pre-existing [facility] Patient:   Andre Aquino                Patient: Rosemary Leventhal MRN: [id number]  SSN: [id number]    YOB: 1935  Age: 82 y.o. Sex: male     Location: [facility]       Code Status: Full Code   PCP: Jenni Buckner MD  - [phone]   Emergency Contact:  Primary Emergency Contact: Rod Salcedo, Home Phone: [phone]   Race/Faith/Language: [facility] / Felix Kavin / Patty Encinas   Payor: Payor: Javier Vences / Plan: [address] / Product Type: Medicare /    Prior Admission Data: 4/8/19 [facility] EMERGENCY DEPT     Hospitalized:  Hospital Day: 3 - Admitted 7/3/2019  7:25 PM   POD # * No surgery found *  by * Surgery not found * - Blood Loss: * No surgery found * * Surgery not found *     CONSULTANTS  IP CONSULT TO TELE-NEUROLOGY  IP CONSULT TO NEUROLOGY  IP CONSULT TO UROLOGY  IP CONSULT TO NEUROLOGY  IP CONSULT TO CARDIOLOGY   ADMISSION DIAGNOSES    ICD-10-CM ICD-9-CM   1. Dizziness R42 780.4   2. Cerebrovascular accident (CVA), unspecified mechanism ([facility] [address] I63.9 434.91         Assessment/Plan:     · Orchalgia - will stop by for exam and decision on additional abx or not. CC: Dizziness and Vomiting   HPI: He is a 82 y.o. male known to [facility], and Dr. Holly Sommers with history of prostatitis. He called our office 7/1 with orchalgia c/w prior prostatitis events and was given Rx for Bactrim empirically over the phone. Admitted blurry vision, dizziness, nausea suspecting intolerance of Bactrim. UA clean, Cx no significant growth - 2 orgs, WBC normal, and scrotal US normal. Abx on hold.    -------------------------------------------------------------------------  Last office note: 6/21/2019  Kitty Bella is an 82year old male who presents today for \"Elevated PSA\". He returns for follow-up.  Mr. Brenton Schaumann returns for a six month followup. He is an 44-year-old gentleman with a history of elevated PSAs thought to be due to prostatitis. He had negative prostate MRI in 2017. His PSA is lower than it was last year, but still hovers in the 13 range. Given his age and negative prostate MRI he will need no further prostate cancer screening. URINALYSIS from Voided specimen  Urine Dip: pH: 6.5, Bld: Neg, LE: Neg, Nit: Neg, Prot: Neg, Ket: Neg, Gluc: Neg  Urine Micro: WBC: 0, RBC: 0, Bacteria: 0    PSA HISTORY  13.54 ng/ml on 2019  13.53 ng/ml on 2018  15.1 ng/ml on 2018    IMPRESSION:    1.  Stable, albeit elevated PSA. 2.  History of prostatitis. PLAN: No further PSAs. Followup as needed. ________________________________________________________________________    . Temp (24hrs), Av °F (36.7 °C), Min:97.8 °F (36.6 °C), Max:98.4 °F (36.9 °C)    Urinary Status: Voiding  Creatinine   Date/Time Value Ref Range Status   2019 03:43 AM 1.21 0.70 - 1.30 MG/DL Final   2019 07:32 AM 1.32 (H) 0.70 - 1.30 MG/DL Final   2019 07:49 PM 1.44 (H) 0.70 - 1.30 MG/DL Final   2019 09:32 PM 1.14 0.70 - 1.30 MG/DL Final   2018 03:29 PM 1.15 0.70 - 1.30 MG/DL Final     Current Antimicrobial Therapy (168h ago, onward)    None        Key Anti-Platelet Anticoagulant Meds     The patient is on no antiplatelet meds or anticoagulants.         Diet: DIET NPO -       Labs     Lab Results   Component Value Date/Time    Lactic acid 1.1 2018 10:58 PM    WBC 8.7 2019 03:43 AM    HCT 42.1 2019 03:43 AM    PLATELET 502  03:43 AM    Sodium 140 2019 03:43 AM    Potassium 3.7 2019 03:43 AM    Chloride 110 (H) 2019 03:43 AM    CO2 25 2019 03:43 AM    BUN 26 (H) 2019 03:43 AM    Creatinine 1.21 2019 03:43 AM    Glucose 96 2019 03:43 AM    Calcium 8.2 (L) 2019 03:43 AM    Magnesium 2.3 2019 03:43 AM    INR 1.1 2019 07:49 PM     UA: Lab Results   Component Value Date/Time    Color YELLOW/STRAW 07/03/2019 11:52 PM    Appearance CLEAR 07/03/2019 11:52 PM    Specific gravity 1.010 07/03/2019 11:52 PM    Specific gravity 1.015 04/08/2019 09:32 PM    pH (UA) 7.0 07/03/2019 11:52 PM    Protein NEGATIVE  07/03/2019 11:52 PM    Glucose NEGATIVE  07/03/2019 11:52 PM    Ketone NEGATIVE  07/03/2019 11:52 PM    Bilirubin NEGATIVE  07/03/2019 11:52 PM    Urobilinogen 1.0 07/03/2019 11:52 PM    Nitrites NEGATIVE  07/03/2019 11:52 PM    Leukocyte Esterase NEGATIVE  07/03/2019 11:52 PM    Epithelial cells FEW 07/03/2019 11:52 PM    Bacteria NEGATIVE  07/03/2019 11:52 PM    WBC 0-4 07/03/2019 11:52 PM    RBC 0-5 07/03/2019 11:52 PM     Imaging     Results for orders placed during the hospital encounter of 07/03/19   CTA CODE NEURO HEAD AND NECK W CONT    Narrative *PRELIMINARY REPORT*  No emergent process. Significant stenosis at the origin of the A1 segment. Stenosis at the origin of the right P1 segment. .  Findings of this exam were discussed with Madonna Holman by Dr. Negra Yousif by  telephone at approximately, 7/3/2019 8:04 PM.  [phone]  . Preliminary report was provided by Dr. Negra Yousif  Final report to follow. *END PRELIMINARY REPORT*  CLINICAL HISTORY: Dizziness and ataxia    EXAMINATION:  CT ANGIOGRAPHY HEAD AND NECK     COMPARISON: 1/11/2012, CT head 7/3/2019, MR brain 7/3/2019    TECHNIQUE:  Following the uneventful administration of iodinated contrast  material, axial CT angiography of the head and neck was performed. Delayed axial  images through the head were also obtained. Coronal and sagittal reconstructions  were obtained. Manual postprocessing of images was performed. 3-D  Sagittal  maximal intensity projection images were obtained. 3-D Coronal maximal  intensity projections were obtained. CT dose reduction was achieved through use  of a standardized protocol tailored for this examination and automatic exposure  control for dose modulation. FINDINGS:    Delayed contrast-enhanced head CT:    The ventricles are midline without hydrocephalus. There is no acute intra or  extra-axial hemorrhage. Mild bilateral subcortical and periventricular areas of  patchy low attenuation is nonspecific but likely related to changes of chronic  small vessel ischemic disease. The basal cisterns are clear. The paranasal  sinuses are clear. CTA NECK:    Great vessels: Normal arch anatomy with the origins patent. Right subclavian artery: Patent    Left subclavian artery: Patent    Right common carotid artery: Patent    Left common carotid artery: Patent    Cervical right internal carotid artery: Patent with proximal atherosclerosis and  less than 50% stenosis by NASCET criteria. Cervical left internal carotid artery: Patent with no significant stenosis by  NASCET criteria. Right vertebral artery: Patent    Left vertebral artery: Patent    Moderate cervical spondylosis    CTA HEAD:    Right cavernous internal carotid artery: Patent    Left cavernous internal carotid artery: Patent    Anterior cerebral arteries: Patent    Anterior communicating artery: Patent    Right middle cerebral artery: Patent with mild to moderate atherosclerosis and  focal eccentric noncalcified atheroma in the posterior M2 division causing  moderate stenosis. Left middle cerebral artery: Patent with mild atherosclerosis    Posterior communicating arteries: Patent    Posterior cerebral arteries: Patent with mild to moderate atherosclerosis    Basilar artery: Patent with mild distal atherosclerosis    Distal vertebral arteries: Patent      Impression Impression:    No evidence for acute large vessel arterial occlusion. Mild to moderate atherosclerosis in the right middle cerebral artery with focal  eccentric noncalcified atheroma in the posterior M2 division causing moderate  stenosis.  Other intracranial and extracranial atherosclerosis as described above                         US Results (most recent):  [facility]   US SCROTUM/TESTICLES    Narrative EXAM: US SCROTUM/TESTICLES     INDICATION: Testicular pain. COMPARISON: None. TECHNIQUE:  Real-time sonography of the scrotum was performed with a high frequency linear  transducer. Multiple static images were obtained. Color Doppler evaluation was  also performed. FINDINGS:  RIGHT TESTICLE: The right testicle measures 4.3 x 2.2 x 3.1 cm for a volume of  15.5 mL. There is positive Doppler flow within the right testicle. There is an  incidental right testicular cyst measuring 4 mm. Vascularity of the right  testicle is normal.    RIGHT EPIDIDYMIS: The right epididymis is normal.    LEFT TESTICLE: The left testicle measures 3.8 x 2.6 x 3.4 cm for a volume of  17.6 mL. Normal vascularity and Doppler flow. Normal echogenicity of the left  testicle. LEFT EPIDIDYMIS: The left epididymis is normal.      Impression IMPRESSION: No focal suspicious testicular mass, evidence of infection, torsion,  hydrocele or varicocele. Cultures     All Micro Results     Procedure Component Value Units Date/Time    CULTURE, URINE [[id number]] Collected:  07/03/19 3702    Order Status:  Completed Specimen:  Urine from Clean catch Updated:  07/05/19 0905     Special Requests: NO SPECIAL REQUESTS        [place] Count --        41876  COLONIES/mL       Culture result:       >2 ORGANISMS - CONTAMINATED SPECIMEN.  SUGGEST RECOLLECTION                 Past History: (Complete 2+/3 areas)     Allergies   Allergen Reactions    Pcn [Penicillins] Unknown (comments)      Current Facility-Administered Medications   Medication Dose Route Frequency    sodium chloride (NS) flush 5-40 mL  5-40 mL IntraVENous Q8H    sodium chloride (NS) flush 5-40 mL  5-40 mL IntraVENous PRN    acetaminophen (TYLENOL) tablet 650 mg  650 mg Oral Q4H PRN    Or    acetaminophen (TYLENOL) solution 650 mg  650 mg Per NG tube Q4H PRN    Or    acetaminophen (TYLENOL) suppository 650 mg  650 mg Rectal Q4H PRN    apixaban (ELIQUIS) tablet 5 mg  5 mg Oral BID    0.9% sodium chloride infusion  75 mL/hr IntraVENous CONTINUOUS    acetaminophen (TYLENOL) tablet 650 mg  650 mg Oral Q4H PRN    diphenhydrAMINE (BENADRYL) capsule 25 mg  25 mg Oral Q4H PRN    ondansetron (ZOFRAN) injection 4 mg  4 mg IntraVENous Q4H PRN    Prior to Admission medications    Medication Sig Start Date End Date Taking? Authorizing Provider   propranolol (INDERAL) 10 mg tablet Take 5 mg by mouth two (2) times a day. Yes Provider, Historical   magnesium oxide (MAG-OX) 400 mg tablet Take 400 mg by mouth daily. Yes Provider, Historical   cyanocobalamin 1,000 mcg tablet Take 1,000 mcg by mouth daily. Yes Provider, Historical   ascorbic acid, vitamin C, (VITAMIN C) 500 mg tablet Take 500 mg by mouth daily. Yes Provider, Historical   acetaminophen (TYLENOL) 325 mg tablet Take 2 Tabs by mouth every six (6) hours as needed. 12/12/18  Yes Jacki Serrano MD        PMHx:  has a past medical history of Benign hypertension, Chest pain, unspecified, CHF (congestive heart failure), NYHA class I, chronic, diastolic ([address] Dyspnea, Hypertension, LBBB (left bundle branch block), Palpitations, and Vertigo. PSurgHx:  has a past surgical history that includes hx tonsil and adenoidectomy. PSocHx:  reports that he has never smoked. He has never used smokeless tobacco. He reports that he does not drink alcohol or use drugs.      Physical Exam: (Comprehesive - 8+ 1995 Systems)     (1) Constitutional:  FIO2:   on SpO2: O2 Sat (%): 94 %  O2 Device: Room air    Patient Vitals for the past 24 hrs:   BP Temp Pulse Resp SpO2 Height Weight   07/05/19 0841 135/67 97.8 °F (36.6 °C) (!) 51 16 94 %     07/05/19 0702   (!) 56       07/05/19 0316 106/50 97.8 °F (36.6 °C) (!) 53 16 94 %     07/04/19 2248 119/46 97.9 °F (36.6 °C) 62 16 95 %     07/04/19 2139   (!) 53       07/04/19 1905 109/50 98 °F (36.7 °C) (!) 54 16 93 %     07/04/19 1536 113/64 98.4 °F (36.9 °C) (!) 52 16 94 %     07/04/19 1500   (!) 55       07/04/19 1222 110/57 98.2 °F (36.8 °C) (!) 51 16 93 %     07/04/19 1139 134/72     6' (1.829 m) 93.4 kg (206 lb)   07/04/19 1107 134/72  (!) 54       07/04/19 1056 101/55  (!) 50  94 %         Date 07/04/19 0700 - 07/05/19 0659 07/05/19 0700 - 07/06/19 0659   Shift 7809-0598 9633-4332 24 Hour Total 4970-6646 1354-4046 24 Hour Total   INTAKE   P.O. 180  180        P. O. 180  180      I. V.(mL/kg/hr)  825(0.7) 825(0.4)        I.V.  825 825      Shift Total(mL/kg) 180(1.9) 825(8.8) 1005(10.8)      OUTPUT   Urine(mL/kg/hr) 300(0.3) 500(0.4) 800(0.4)        Urine Voided 300 500 800      Shift Total(mL/kg) 300(3.2) 500(5.4) 800(8.6)      NET -120 325 205      Weight (kg) 93.4 93.4 93.4 93.4 93.4 93.4        Signed By: Ja Cunningham MD  - July 5, 2019

## 2019-07-06 VITALS
SYSTOLIC BLOOD PRESSURE: 141 MMHG | RESPIRATION RATE: 16 BRPM | HEART RATE: 51 BPM | TEMPERATURE: 97.7 F | BODY MASS INDEX: 27.9 KG/M2 | HEIGHT: 72 IN | DIASTOLIC BLOOD PRESSURE: 85 MMHG | OXYGEN SATURATION: 93 % | WEIGHT: 206 LBS

## 2019-07-06 PROCEDURE — 74011250637 HC RX REV CODE- 250/637: Performed by: INTERNAL MEDICINE

## 2019-07-06 RX ORDER — CEPHALEXIN 250 MG/1
500 CAPSULE ORAL EVERY 6 HOURS
Status: DISCONTINUED | OUTPATIENT
Start: 2019-07-06 | End: 2019-07-06 | Stop reason: HOSPADM

## 2019-07-06 RX ORDER — ATORVASTATIN CALCIUM 20 MG/1
20 TABLET, FILM COATED ORAL DAILY
Qty: 30 TAB | Refills: 0 | Status: SHIPPED | OUTPATIENT
Start: 2019-07-06 | End: 2019-08-05

## 2019-07-06 RX ORDER — CEPHALEXIN 500 MG/1
500 CAPSULE ORAL EVERY 6 HOURS
Qty: 28 CAP | Refills: 0 | Status: SHIPPED | OUTPATIENT
Start: 2019-07-06 | End: 2019-07-13

## 2019-07-06 RX ADMIN — APIXABAN 5 MG: 5 TABLET, FILM COATED ORAL at 09:07

## 2019-07-06 RX ADMIN — CEPHALEXIN 500 MG: 250 CAPSULE ORAL at 12:33

## 2019-07-06 NOTE — PROGRESS NOTES
Home Care Face to Face Encounter    Patients Name: Lorelei Jacobson    YOB: 1935    Primary Diagnosis: Acute pontine stroke    Date of Face to Face:   July 6, 2019                                 Face to Face Encounter findings are related to primary reason for home care:   yes. 1. I certify that the patient needs intermittent care as follows: physical therapy: strengthening, stretching/ROM, transfer training, gait/stair training, balance training and pt/caregiver education  occupational therapy:  ADL safety (ie. cooking, bathing, dressing), ROM and pt/caregiver education    2. I certify that this patient is homebound, that is: 1) patient requires the use of a walker device, special transportation, or assistance of another to leave the home; or 2) patient's condition makes leaving the home medically contraindicated; and 3) patient has a normal inability to leave the home and leaving the home requires considerable and taxing effort. Patient may leave the home for infrequent and short duration for medical reasons, and occasional absences for non-medical reasons. Homebound status is due to the following functional limitations: Patient with strength deficits limiting the performance of all ADL's without caregiver assistance or the use of an assistive device. Patient with poor safety awareness and is at risk for falls without assistance of another person and the use of an assistive device. Patient with poor ambulation endurance limiting their safe ability to ascend/descend the required number of steps to leave the home. 3. I certify that this patient is under my care and that I, or a nurse practitioner or  [id number], or clinical nurse specialist, or certified nurse midwife, working with me, had a Face-to-Face Encounter that meets the physician Face-to-Face Encounter requirements.   The following are the clinical findings from the [address] [place] [address] encounter that support the need for skilled services and is a summary of the encounter: per PT note 'Pt comes to sit with CGA eye patch on right eye. Pt CGA to stand. Pt ambulated 130ft with a cane CGA to min assist  and cues to decrease sofie. Pt Pt was unsteady at times and is at increased risk for falls. Pt CGA back to bed. Continue goals\"    See discharge summary      Kasey Glynn MD  7/6/2019

## 2019-07-06 NOTE — PROGRESS NOTES
Bedside and Verbal shift change report given to RIVERA Amos (oncoming nurse) by Tesha Markham (offgoing nurse). Report included the following information SBAR and Kardex.

## 2019-07-06 NOTE — DISCHARGE SUMMARY
Physician Discharge Summary     Patient ID:  Dolores Garibay  [id number]  82 y.o.  1935    Admit date: 7/3/2019    Discharge date and time: 7/6/2019    Admission Diagnoses: Dizziness [R42]  Acute CVA (cerebrovascular accident) [facility]) [I63.9]    Discharge Diagnoses:    Principal Diagnosis   Left pontine CVA ([address]                                             Other Diagnoses    Benign hypertension ()    LBBB (left bundle branch block) ()    Prostate enlargement (12/11/2018)    Dizziness (7/3/2019)    Nausea & vomiting (7/3/2019)    Right arm numbness (7/3/2019)    Blurry vision (7/3/2019)       Hospital Course:   Left pontine CVA / Dizziness / Right arm numbness / Blurry vision - POA. LDL 58. Negative alcohol and drug screen.  Neurology consulted.  PT/OT eval recommend PT. Resumed apixaban. Start statin.     Dehydration / Hyponatremia / Renal insufficiency/ Nausea and vomiting - POA, as indicated by labs. Billy Gotti exacerbated his dizziness. Now resolved with hydration and supportive care     Benign hypertension / LBBB (left bundle branch block) / Bradycardia - Stop propranolol in setting of dizziness, bradycardia and symptoms. Cards consulted. RICKY unremarkable. Outpatient follow up.     Prostate enlargement / Testicular infection - Stop bactrim. Consulted urology. Follow up as outpatient. Complete Keflex. PCP: Sada Chaudhari MD    Consults: Cardiology, Neurology and Urology    Significant Diagnostic Studies: See Hospital Course    Discharged home in improved condition.     Discharge Exam:  /85 (BP 1 Location: Right arm, BP Patient Position: At rest)   Pulse (!) 51   Temp 97.7 °F (36.5 °C)   Resp 16   Ht 6' (1.829 m)   Wt 93.4 kg (206 lb)   SpO2 93%   BMI 27.94 kg/m²      Gen:  Well-developed, well-nourished, in no acute distress  HEENT:  Pink conjunctivae, L eye lower, hearing intact to voice, moist mucous membranes  Neck:  Supple, without masses, thyroid non-tender  Resp:  No accessory muscle use, clear breath sounds without wheezes rales or rhonchi  Card:  No murmurs, bradycardic S1, S2 without thrills, bruits or peripheral edema  Abd:  Soft, non-tender, non-distended, normoactive bowel sounds are present, no   Lymph:  No cervical or inguinal adenopathy  Musc:  No cyanosis or clubbing  Skin:  No rashes or ulcers, skin turgor is good  Neuro:  Cranial nerves are grossly intact, mild motor weakness, follows commands appropriately  Psych:  Good insight, oriented to person, place and time, alert    Patient Instructions:   Current Discharge Medication List      START taking these medications    Details   cephALEXin (KEFLEX) 500 mg capsule Take 1 Cap by mouth every six (6) hours for 7 days. Qty: 28 Cap, Refills: 0      atorvastatin (LIPITOR) 20 mg tablet Take 1 Tab by mouth daily for 30 days. Qty: 30 Tab, Refills: 0      apixaban (ELIQUIS) 5 mg tablet Take 1 Tab by mouth two (2) times a day. Indications: CVA, ?PAF, recommended by cardiology and neurology  Qty: 60 Tab, Refills: 0         CONTINUE these medications which have NOT CHANGED    Details   ascorbic acid, vitamin C, (VITAMIN C) 500 mg tablet Take 500 mg by mouth daily. acetaminophen (TYLENOL) 325 mg tablet Take 2 Tabs by mouth every six (6) hours as needed. STOP taking these medications       propranolol (INDERAL) 10 mg tablet Comments:   Reason for Stopping:         magnesium oxide (MAG-OX) 400 mg tablet Comments:   Reason for Stopping:         cyanocobalamin 1,000 mcg tablet Comments:   Reason for Stopping:             Activity: Activity as tolerated and PT/OT per Home Health  Diet: Cardiac Diet and Low fat, Low cholesterol  Wound Care: None needed    Follow-up with your PCP, neurology, urology and cardiology in a few weeks.   Follow-up tests/labs - none    Signed:  Elesa Peabody, MD  7/6/2019  8:09 AM

## 2019-07-06 NOTE — PROGRESS NOTES
7/6/2019     11:22 AM  PREETI advised pt has been accepted by [facility] for home health services with start date of 7/7/2019, agency information added to discharge summary and nursing and patient notified. ANDREW Encinas    11:04 AM  PREETI met with pt to discuss [place] options and educate on 2nd IMM. Pt selected [facility] for home health services, pt signed choice letter and letter placed in hard chart, allscripts referral sent to [facility] requesting services. CM contacted [facility] to advise referral sent, no answer, left vmail message requesting return call. CM educated on 2nd IMM, pt signed doc, copies provided to pt and placed in hard chart.   CM following for [place] approval.  ANDREW Encinas

## 2019-07-06 NOTE — PROGRESS NOTES
Neurology Hospital Progress Note    Patient ID:  Alex Álvarez  [id number]  33 y.o.  1935      Subjective:   History:  Brittney Peralta a 82 y. o. 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 who yesterday developed nausea/vomiting/dizziness and R arm numbness prompting admission to the hospital. 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     Patient had RICKY which was unremrakable. Patient doing okay with no new event overnight. On Eliquis. ROS:  Per HPI-  Otherwise the remainder of the review of system was negative      Social Hx:  Social History     Socioeconomic History    Marital status:      Spouse name: Not on file    Number of children: Not on file    Years of education: Not on file    Highest education level: Not on file   Tobacco Use    Smoking status: Never Smoker    Smokeless tobacco: Never Used   Substance and Sexual Activity    Alcohol use: No    Drug use: Never       Meds:  No current facility-administered medications on file prior to encounter. Current Outpatient Medications on File Prior to Encounter   Medication Sig Dispense Refill    propranolol (INDERAL) 10 mg tablet Take 5 mg by mouth two (2) times a day.  magnesium oxide (MAG-OX) 400 mg tablet Take 400 mg by mouth daily.  cyanocobalamin 1,000 mcg tablet Take 1,000 mcg by mouth daily.  ascorbic acid, vitamin C, (VITAMIN C) 500 mg tablet Take 500 mg by mouth daily.  acetaminophen (TYLENOL) 325 mg tablet Take 2 Tabs by mouth every six (6) hours as needed. Imaging:    CT Results (recent):  Results from Hospital Encounter encounter on 07/03/19   CTA CODE NEURO HEAD AND NECK W CONT    Narrative *PRELIMINARY REPORT*  No emergent process.  Significant stenosis at the origin of the A1 segment. Stenosis at the origin of the right P1 segment. .  Findings of this exam were discussed with Talib Keenan by Dr. Kaya Urrutia by  telephone at approximately, 7/3/2019 8:04 PM.  [id number]  . Preliminary report was provided by Dr. Kaya Urrutia  Final report to follow. *END PRELIMINARY REPORT*  CLINICAL HISTORY: Dizziness and ataxia    EXAMINATION:  CT ANGIOGRAPHY HEAD AND NECK     COMPARISON: 1/11/2012, CT head 7/3/2019, MR brain 7/3/2019    TECHNIQUE:  Following the uneventful administration of iodinated contrast  material, axial CT angiography of the head and neck was performed. Delayed axial  images through the head were also obtained. Coronal and sagittal reconstructions  were obtained. Manual postprocessing of images was performed. 3-D  Sagittal  maximal intensity projection images were obtained. 3-D Coronal maximal  intensity projections were obtained. CT dose reduction was achieved through use  of a standardized protocol tailored for this examination and automatic exposure  control for dose modulation. FINDINGS:    Delayed contrast-enhanced head CT:    The ventricles are midline without hydrocephalus. There is no acute intra or  extra-axial hemorrhage. Mild bilateral subcortical and periventricular areas of  patchy low attenuation is nonspecific but likely related to changes of chronic  small vessel ischemic disease. The basal cisterns are clear. The paranasal  sinuses are clear. CTA NECK:    Great vessels: Normal arch anatomy with the origins patent. Right subclavian artery: Patent    Left subclavian artery: Patent    Right common carotid artery: Patent    Left common carotid artery: Patent    Cervical right internal carotid artery: Patent with proximal atherosclerosis and  less than 50% stenosis by NASCET criteria. Cervical left internal carotid artery: Patent with no significant stenosis by  NASCET criteria.     Right vertebral artery: Patent    Left vertebral artery: Patent    Moderate cervical spondylosis    CTA HEAD:    Right cavernous internal carotid artery: Patent    Left cavernous internal carotid artery: Patent    Anterior cerebral arteries: Patent    Anterior communicating artery: Patent    Right middle cerebral artery: Patent with mild to moderate atherosclerosis and  focal eccentric noncalcified atheroma in the posterior M2 division causing  moderate stenosis. Left middle cerebral artery: Patent with mild atherosclerosis    Posterior communicating arteries: Patent    Posterior cerebral arteries: Patent with mild to moderate atherosclerosis    Basilar artery: Patent with mild distal atherosclerosis    Distal vertebral arteries: Patent      Impression Impression:    No evidence for acute large vessel arterial occlusion. Mild to moderate atherosclerosis in the right middle cerebral artery with focal  eccentric noncalcified atheroma in the posterior M2 division causing moderate  stenosis. Other intracranial and extracranial atherosclerosis as described above                         MRI Results (recent):  Results from Hospital Encounter encounter on 07/03/19   MRI BRAIN WO CONT    Narrative *PRELIMINARY REPORT*  Restricted diffusion to the left of midline in the [place] suggesting ischemia. Preliminary report was provided by Dr. Godfrey Dietz  Final report to follow. *END PRELIMINARY REPORTINDICATION:  Dizziness, ataxia     COMPARISON:  CT head parts from yesterday, MR brain 1/4/2019    TECHNIQUE:  MR imaging of the brain was performed with sagittal T1, axial T1,  T2, FLAIR, GRE, DWI/ADC;     FINDINGS:      The ventricles are midline without hydrocephalus. There is no acute intra or extra-axial fluid collection. Mild focus of  restricted diffusion in the left [place] and small focus of diffusion signal in the  left midbrain compatible with acute infarctions.  Mild to moderate  periventricular and scattered bilateral subcortical increased T2/flair signal  abnormalities are nonspecific but likely represent changes of chronic small  vessel ischemic disease. The major intracranial vascular flow-voids are patent. No abnormal signal in the mastoid air cells or visualized paranasal sinuses. Visualized soft tissues unremarkable. Impression IMPRESSION:    Small left pontine and left midbrain acute infarctions. Mild to moderate white matter signal abnormalities likely represent changes of  chronic small vessel ischemic disease      Findings discussed with patient's nurse Shari at 12:34 PM 7/4/2019                IR Results (recent):  No results found for this or any previous visit. VAS/US Results (recent):  Results from Hospital Encounter encounter on 04/06/15   DUPLEX CAROTID BILATERAL       Reviewed records in [facility] media tab today    Lab Review     Admission on 07/03/2019   No results displayed because visit has over 200 results. Admission on 04/08/2019, Discharged on 04/08/2019   Component Date Value Ref Range Status    WBC 04/08/2019 8.3  4.1 - 11.1 K/uL Final    RBC 04/08/2019 5.19  4. 10 - 5.70 M/uL Final    HGB 04/08/2019 16.7  12.1 - 17.0 g/dL Final    HCT 04/08/2019 49.1  36.6 - 50.3 % Final    MCV 04/08/2019 94.6  80.0 - 99.0 FL Final    MCH 04/08/2019 32.2  26.0 - 34.0 PG Final    MCHC 04/08/2019 34.0  30.0 - 36.5 g/dL Final    RDW 04/08/2019 12.7  11.5 - 14.5 % Final    PLATELET 80/74/4476 562  150 - 400 K/uL Final    MPV 04/08/2019 9.7  8.9 - 12.9 FL Final    NRBC 04/08/2019 0.0  0  WBC Final    ABSOLUTE NRBC 04/08/2019 0.00  0.00 - 0.01 K/uL Final    NEUTROPHILS 04/08/2019 49  32 - 75 % Final    LYMPHOCYTES 04/08/2019 39  12 - 49 % Final    MONOCYTES 04/08/2019 9  5 - 13 % Final    EOSINOPHILS 04/08/2019 2  0 - 7 % Final    BASOPHILS 04/08/2019 1  0 - 1 % Final    IMMATURE GRANULOCYTES 04/08/2019 0  0.0 - 0.5 % Final    ABS. NEUTROPHILS 04/08/2019 4.0  1.8 - 8.0 K/UL Final    ABS.  LYMPHOCYTES 04/08/2019 3.2  0.8 - 3.5 K/UL Final    ABS. MONOCYTES 04/08/2019 0.8  0.0 - 1.0 K/UL Final    ABS. EOSINOPHILS 04/08/2019 0.2  0.0 - 0.4 K/UL Final    ABS. BASOPHILS 04/08/2019 0.1  0.0 - 0.1 K/UL Final    ABS. IMM. GRANS. 04/08/2019 0.0  0.00 - 0.04 K/UL Final    DF 04/08/2019 AUTOMATED    Final    Sodium 04/08/2019 138  136 - 145 mmol/L Final    Potassium 04/08/2019 3.7  3.5 - 5.1 mmol/L Final    Chloride 04/08/2019 105  97 - 108 mmol/L Final    CO2 04/08/2019 26  21 - 32 mmol/L Final    Anion gap 04/08/2019 7  5 - 15 mmol/L Final    Glucose 04/08/2019 88  65 - 100 mg/dL Final    BUN 04/08/2019 24* 6 - 20 MG/DL Final    Creatinine 04/08/2019 1.14  0.70 - 1.30 MG/DL Final    BUN/Creatinine ratio 04/08/2019 21* 12 - 20   Final    GFR est AA 04/08/2019 >60  >60 ml/min/1.73m2 Final    GFR est non-AA 04/08/2019 >60  >60 ml/min/1.73m2 Final    Comment: Estimated GFR is calculated using the IDMS-traceable Modification of Diet in Renal Disease (MDRD) Study equation, reported for both  Americans (GFRAA) and non- Americans (GFRNA), and normalized to 1.73m2 body surface area. The physician must decide which value applies to the patient. The MDRD study equation should only be used in individuals age 25 or older. It has not been validated for the following: pregnant women, patients with serious comorbid conditions, or on certain medications, or persons with extremes of body size, muscle mass, or nutritional status.  Calcium 04/08/2019 9.1  8.5 - 10.1 MG/DL Final    Bilirubin, total 04/08/2019 0.8  0.2 - 1.0 MG/DL Final    ALT (SGPT) 04/08/2019 24  12 - 78 U/L Final    AST (SGOT) 04/08/2019 21  15 - 37 U/L Final    Alk.  phosphatase 04/08/2019 123* 45 - 117 U/L Final    Protein, total 04/08/2019 7.2  6.4 - 8.2 g/dL Final    Albumin 04/08/2019 3.8  3.5 - 5.0 g/dL Final    Globulin 04/08/2019 3.4  2.0 - 4.0 g/dL Final    A-G Ratio 04/08/2019 1.1  1.1 - 2.2   Final    Lipase 04/08/2019 138  73 - 393 U/L Final  Color 04/08/2019 YELLOW/STRAW    Final    Color Reference Range: Straw, Yellow or Dark Yellow    Appearance 04/08/2019 CLEAR  CLEAR   Final    Specific gravity 04/08/2019 1.015  1.003 - 1.030   Final    pH (UA) 04/08/2019 6.0  5.0 - 8.0   Final    Protein 04/08/2019 NEGATIVE   NEG mg/dL Final    Glucose 04/08/2019 NEGATIVE   NEG mg/dL Final    Ketone 04/08/2019 NEGATIVE   NEG mg/dL Final    Bilirubin 04/08/2019 NEGATIVE   NEG   Final    Blood 04/08/2019 NEGATIVE   NEG   Final    Urobilinogen 04/08/2019 0.2  0.2 - 1.0 EU/dL Final    Nitrites 04/08/2019 NEGATIVE   NEG   Final    Leukocyte Esterase 04/08/2019 NEGATIVE   NEG   Final    WBC 04/08/2019 0-4  0 - 4 /hpf Final    RBC 04/08/2019 0-5  0 - 5 /hpf Final    Epithelial cells 04/08/2019 FEW  FEW /lpf Final    Epithelial cell category consists of squamous cells and /or transitional urothelial cells. Renal tubular cells, if present, are separately identified as such.  Bacteria 04/08/2019 NEGATIVE   NEG /hpf Final    Hyaline cast 04/08/2019 0-2  0 - 5 /lpf Final    Urine culture hold 04/08/2019 URINE ON HOLD IN MICROBIOLOGY DEPT FOR 3 DAYS. IF UNPRESERVED URINE IS SUBMITTED, IT CANNOT BE USED FOR ADDITIONAL TESTING AFTER 24 HRS, RECOLLECTION WILL BE REQUIRED.     Final    Ventricular Rate 04/08/2019 55  BPM Final    Atrial Rate 04/08/2019 55  BPM Final    P-R Interval 04/08/2019 216  ms Final    QRS Duration 04/08/2019 140  ms Final    Q-T Interval 04/08/2019 472  ms Final    QTC Calculation (Bezet) 04/08/2019 451  ms Final    Calculated P Axis 04/08/2019 56  degrees Final    Calculated R Axis 04/08/2019 0  degrees Final    Calculated T Axis 04/08/2019 82  degrees Final    Diagnosis 04/08/2019    Final                    Value:Sinus bradycardia with sinus arrhythmia with 1st degree AV block  Left bundle branch block  Abnormal ECG  When compared with ECG of 26-DEC-2018 15:39,  No significant change was found  Confirmed by Janiya Penn MD., Denise ([id number]) on 4/9/2019 2:30:40 PM      SAMPLES BEING HELD 04/08/2019 RED,BLUE,GOLD,GRN   Final    COMMENT 04/08/2019 Add-on orders for these samples will be processed based on acceptable specimen integrity and analyte stability, which may vary by analyte. Final         Objective:       Exam:  Visit Vitals  /85 (BP 1 Location: Right arm, BP Patient Position: At rest)   Pulse (!) 51   Temp 97.7 °F (36.5 °C)   Resp 16   Ht 6' (1.829 m)   Wt 93.4 kg (206 lb)   SpO2 93%   BMI 27.94 kg/m²     Gen: Awake, alert, follows commands  Appropriate appearance, normal speech. Oriented to all spheres. No visual field defect on confrontation exam.  (+) eye patch  Normal gag and swallow. All remaining cranial nerves were normal  Motor function: 5/5 in all extremities  Sensory: intact to LT, PP and JPS  Good FTN and HTS   Gait: Deferred        Assessment:     1. Dizziness    2. Cerebrovascular accident (CVA), unspecified mechanism ([address]      L pontine stroke    Plan:   1. Follow up with Cardiology regarding work up for paroxysmal afib  2. Continue Eliquis 5 mg BID  3. LDL 57.8  4. Keep SBP < 140  5. Physical therapy  6. Needs to follow up with ophthalmologist regarding his eye issues         Please call for questions    35 mins of time spent, 50% of which was spent on counseling and coordination of care.       Blair Zuluaga MD  Diplomate, American Board of Psychiatry and Neurology  Diplomate, Neuromuscular Medicine  Diplomate, American Board of Electrodiagnostic Medicine

## 2019-07-06 NOTE — DISCHARGE INSTRUCTIONS
HOSPITALIST DISCHARGE INSTRUCTIONS  NAME: Cassie Jones   :  1935   MRN:  [id number]     Date/Time:  2019 12:18 PM    ADMIT DATE: 7/3/2019     DISCHARGE DATE: 2019     PRINCIPAL DISCHARGE DIAGNOSES:  Stroke  Dizziness  Double vision    MEDICATIONS:  · It is important that you take the medication exactly as they are prescribed. Note the changes and additions to your medications. Be sure you understand these changes before you are discharged today. · Keep your medication in the bottles provided by the pharmacist and keep a list of the medication names, dosages, and times to be taken in your wallet. · Do not take other medications without consulting your doctor. Pain Management: per above medications    What to do at Home  No driving for 6 months after stroke    Recommended diet:  Cardiac Diet    Recommended activity: PT/OT Eval and Treat    If you experience any of the following symptoms then please call your primary care physician or return to the emergency room if you cannot get hold of your doctor:  severe chest pain, shortness of breath, dizziness, weakness, numbness, slurred vision, facial droop, vision changes, confusion, severe headache, bleeding, or other severe concerning symptoms that brought you to the hospital in the first place    Follow Up:   Follow-up Information     Follow up With Specialties Details Why Yahir Moya MD General Practice   [address] [place] [address] [id number]  In 2 weeks post-stroke follow up [address] [place] [address]  [phone]    Mel Peralta MD Cardiology  Follow up with Dr. Daniel Sellers or your cardiologist after discharge [address] [place] [address]   Follow up with your physician at [facility] after discharge     [facility]   Follow up with your urologist              Information obtained by :  I understand that if any problems occur once I am at home I am to contact my physician. I understand and acknowledge receipt of the instructions indicated above.                                                                                                                                            Physician's or R.N.'s Signature                                                                  Date/Time                                                                                                                                              Patient or Representative Signature                                                          Date/Time

## 2019-07-06 NOTE — PROGRESS NOTES
[facility]    Medical Progress Note      NAME: Naseem Davis   :  1935  MRM:  [id number]    Date/Time: 2019  8:03 AM          Assessment and Plan:     Left pontine CVA / Dizziness / Right arm numbness / Blurry vision - POA. LDL 58. Negative alcohol and drug screen. Neurology consulted. PT/OT eval recommend PT. Resumed apixaban.     Dehydration / Hyponatremia / Renal insufficiency/ Nausea and vomiting - POA, as indicated by labs. Likely exacerbated his dizziness. Now resolved with hydration and supportive care     Benign hypertension / LBBB (left bundle branch block) - Hold hydralazine in setting of dizziness and symptoms, continue propranolol. Cards consulted. RICKY unremarkable.     Prostate enlargement / Testicular infection - Stop bactrim for now and consult urology to determine to continue, alter or stop Abx therapy. Subjective:     Chief Complaint:  Still a bit unsteady by otherwise doing okay    ROS:  (bold if positive, if negative)    Tolerating PT  Tolerating Diet        Objective:     Last 24hrs VS reviewed since prior progress note.  Most recent are:    Visit Vitals  /85 (BP 1 Location: Right arm, BP Patient Position: At rest)   Pulse (!) 51   Temp 97.7 °F (36.5 °C)   Resp 16   Ht 6' (1.829 m)   Wt 93.4 kg (206 lb)   SpO2 93%   BMI 27.94 kg/m²     SpO2 Readings from Last 6 Encounters:   19 93%   19 95%   18 95%   18 94%   18 94%   13 93%    O2 Flow Rate (L/min): 2 l/min       Intake/Output Summary (Last 24 hours) at 2019 0803  Last data filed at 2019 4661  Gross per 24 hour   Intake 1065 ml   Output 700 ml   Net 365 ml        Physical Exam:    Gen:  Well-developed, well-nourished, in no acute distress  HEENT:  Pink conjunctivae, L eye lower, hearing intact to voice, moist mucous membranes  Neck:  Supple, without masses, thyroid non-tender  Resp:  No accessory muscle use, clear breath sounds without wheezes rales or rhonchi  Card:  No murmurs, bradycardic S1, S2 without thrills, bruits or peripheral edema  Abd:  Soft, non-tender, non-distended, normoactive bowel sounds are present, no   Lymph:  No cervical or inguinal adenopathy  Musc:  No cyanosis or clubbing  Skin:  No rashes or ulcers, skin turgor is good  Neuro:  Cranial nerves are grossly intact, mild motor weakness, follows commands appropriately  Psych:  Good insight, oriented to person, place and time, alert    Telemetry reviewed:   normal sinus rhythm  __________________________________________________________________  Medications Reviewed: (see below)  Medications:     Current Facility-Administered Medications   Medication Dose Route Frequency    cephALEXin (KEFLEX) capsule 500 mg  500 mg Oral Q6H    sodium chloride (NS) flush 5-40 mL  5-40 mL IntraVENous Q8H    sodium chloride (NS) flush 5-40 mL  5-40 mL IntraVENous PRN    apixaban (ELIQUIS) tablet 5 mg  5 mg Oral BID    acetaminophen (TYLENOL) tablet 650 mg  650 mg Oral Q4H PRN    ondansetron (ZOFRAN) injection 4 mg  4 mg IntraVENous Q4H PRN        Lab Data Reviewed: (see below)  Lab Review:     Recent Labs     07/05/19  0343 07/03/19 1949   WBC 8.7 8.9   HGB 14.3 16.6   HCT 42.1 48.5    156     Recent Labs     07/05/19  0343 07/04/19  0732 07/03/19 1949    139 133*   K 3.7 3.8 4.3   * 107 100   CO2 25 25 26   GLU 96 116* 194*   BUN 26* 25* 21*   CREA 1.21 1.32* 1.44*   CA 8.2* 8.6 8.6   MG 2.3 2.6* 2.2   PHOS 2.6  --   --    ALB 3.0* 3.2* 3.5   TBILI  --  0.7 1.0   SGOT  --  23 27   ALT  --  26 31   INR  --   --  1.1     Lab Results   Component Value Date/Time    Glucose (POC) 185 (H) 07/03/2019 07:17 PM     No results for input(s): PH, PCO2, PO2, HCO3, FIO2 in the last 72 hours.   Recent Labs     07/03/19 1949   INR 1.1     All Micro Results     Procedure Component Value Units Date/Time    CULTURE, URINE [[id number]] Collected:  07/03/19 8003    Order Status:  Completed Specimen:  Urine from Clean catch Updated:  07/05/19 8063     Special Requests: NO SPECIAL REQUESTS        [place] Count --        [id number]  COLONIES/mL       Culture result:       >2 ORGANISMS - CONTAMINATED SPECIMEN. SUGGEST RECOLLECTION                Other pertinent lab: none    Total time spent with patient: 39 Minutes I reviewed chart, notes, data and current medications in the medical record. I have examined and treated the patient at bedside during this period.                  Care Plan discussed with: Patient, Family, Care Manager, Nursing Staff and >50% of time spent in counseling and coordination of care    Discussed:  Care Plan and D/C Planning    Prophylaxis:  H2B/PPI and Eliqus    Disposition:   PT, OT, RN           ___________________________________________________    Attending Physician: Wendi Cheney MD

## 2019-07-06 NOTE — PROGRESS NOTES
Discharge instructions and STROKE education and risk factors explained and given to patient and wife at bedside. Prescriptions given to patient. Awaiting ride. IV line removed. Opportunity for questions presented. No further needs at this time.

## 2019-07-08 ENCOUNTER — PATIENT OUTREACH (OUTPATIENT)
Dept: FAMILY MEDICINE CLINIC | Age: 84
End: 2019-07-08

## 2019-07-08 NOTE — PROGRESS NOTES
Hospital Discharge Follow-Up      Date/Time:  2019 3:12 PM    Patient was admitted to [facility] on 7/3/19 and discharged on 19 for acute CVA. The physician discharge summary was available at the time of outreach. Patient was contacted within 1 business days of discharge. Top Challenges reviewed with the provider   · 19 echo with 55% EF         Method of communication with provider :none    Inpatient RRAT score: 21  Was this a readmission? no   Patient stated reason for the readmission: n/a    Nurse Navigator (NN) contacted the patient by telephone to perform post hospital discharge assessment. Verified name and  with patient as identifiers. Provided introduction to self, and explanation of the Nurse Navigator role. Reviewed discharge instructions and red flags with patient who verbalized understanding. Patient given an opportunity to ask questions and does not have any further questions or concerns at this time. The patient agrees to contact the PCP office for questions related to their healthcare. NN provided contact information for future reference. Disease Specific:   CVA    Summary of patient's top problems:  1. Left pontine CVA / Dizziness / Right arm numbness / Blurry vision - POA.  LDL 58. Negative alcohol and drug screen.  Neurology consulted.  PT/OT eval recommend PT. Resumed apixaban. Start statin. 2. Dehydration / Hyponatremia / Renal insufficiency/ Nausea and vomiting - POA, as indicated by labs.  Likely exacerbated his dizziness. Now resolved with hydration and supportive care    3. Benign hypertension / LBBB (left bundle branch block) / Bradycardia - Stop propranolol in setting of dizziness, bradycardia and symptoms. Cards consulted.  RICKY unremarkable. Outpatient follow up.       Home Health orders at discharge: PT, OT, Sami [facility]: [facility]  Date of initial visit: 19    Durable Medical Equipment ordered/company: [employer] Medical Equipment received: n/a    Barriers to care? medication management    Advance Care Planning:   Does patient have an Advance Directive:  not on file     Medication(s):   New Medications at Discharge: Keflex, Lipitor, Eliquis  Changed Medications at Discharge: none  Discontinued Medications at Discharge: Inderal, Mag-Ox, Cyanocobalamin    Medication reconciliation was performed with patient, who verbalizes understanding of administration of home medications. There were no barriers to obtaining medications identified at this time. Referral to Pharm D needed: no     Current Outpatient Medications   Medication Sig    cephALEXin (KEFLEX) 500 mg capsule Take 1 Cap by mouth every six (6) hours for 7 days.  apixaban (ELIQUIS) 5 mg tablet Take 1 Tab by mouth two (2) times a day. Indications: CVA, ?PAF, recommended by cardiology and neurology    ascorbic acid, vitamin C, (VITAMIN C) 500 mg tablet Take 500 mg by mouth daily.  acetaminophen (TYLENOL) 325 mg tablet Take 2 Tabs by mouth every six (6) hours as needed.  atorvastatin (LIPITOR) 20 mg tablet Take 1 Tab by mouth daily for 30 days. No current facility-administered medications for this visit. There are no discontinued medications. [facility] follow up appointment(s): No future appointments. Non-[facility] follow up appointment(s): PCP, Cards, Neuro, Uro  [facility]:  n/a       Goals      Attends follow-up appointments as directed. 7/8/19:  PCP 7/16/19  Neurology TBD  Urology TBD  Cardiology TBD  Patient states that his son is scheduling all of his follow up appointments. Agrees to update NN on next call.  Knowledge and adherence of prescribed medication (ie. action, side effects, missed dose, etc.).      7/8/19:  Patient reports only picking up two prescriptions at discharge (Keflex and Eliquis). States he did not know about the Lipitor. Per d/c summary, all three prescriptions sent to same pharmacy.   Patient will call pharmacy now and notify NN if there is a problem getting it. NN will follow up again in one week otherwise.  Supportive resources in place to maintain patient in the community (ie.  Home Health, DME equipment, refer to, medication assistant plan, etc.)      7/8/19:  Darryl Nuno in place with [facility]

## 2019-07-16 ENCOUNTER — PATIENT OUTREACH (OUTPATIENT)
Dept: FAMILY MEDICINE CLINIC | Age: 84
End: 2019-07-16

## 2019-07-16 NOTE — PROGRESS NOTES
Goals      Attends follow-up appointments as directed. 7/16/19:  Patient saw Cardiology today. PCP 7/17/19  Neurology - will call and schedule f/u for next week  States he had just seen Urology prior to admission. 7/8/19:  PCP 7/16/19  Neurology TBD  Urology TBD  Cardiology TBD  Patient states that his son is scheduling all of his follow up appointments. Agrees to update NN on next call.  Knowledge and adherence of prescribed medication (ie. action, side effects, missed dose, etc.).      7/16/19:  Patient admits he did not  Lipitor because \"My wife doesn't really like that medicine, but I saw Cardiology yesterday, who told me to get it\". Patient will see PCP tomorrow and discuss Lipitor further. NN will f/u again in one week. 7/8/19:  Patient reports only picking up two prescriptions at discharge (Keflex and Eliquis). States he did not know about the Lipitor. Per d/c summary, all three prescriptions sent to same pharmacy. Patient will call pharmacy now and notify NN if there is a problem getting it. NN will follow up again in one week otherwise.  Supportive resources in place to maintain patient in the community (ie. Home Health, DME equipment, refer to, medication assistant plan, etc.)      7/16/19:  [facility] remains in place with [facility] who visit twice weekly. Patient reports no deficits other than \"eyes being crossed\".       7/8/19:  HH in place with [facility]

## 2019-07-26 ENCOUNTER — PATIENT OUTREACH (OUTPATIENT)
Dept: FAMILY MEDICINE CLINIC | Age: 84
End: 2019-07-26

## 2019-07-26 NOTE — PROGRESS NOTES
Goals      Attends follow-up appointments as directed. 7/26/19:  Patient saw PCP on 7/17/19 and will see again in two weeks. Has not called and scheduled neurology yet. Contact information given to patient for Neurology Clinic at [facility]. Patient agrees to call and schedule now. NN will f/u again in one week. 7/16/19:  Patient saw Cardiology today. PCP 7/17/19  Neurology - will call and schedule f/u for next week  States he had just seen Urology prior to admission. 7/8/19:  PCP 7/16/19  Neurology TBD  Urology TBD  Cardiology TBD  Patient states that his son is scheduling all of his follow up appointments. Agrees to update NN on next call.  Knowledge and adherence of prescribed medication (ie. action, side effects, missed dose, etc.).      7/26/19:  Patient has started Lipitor as agreed. Reports he was also restarted on Eliquis by his PCP.    7/16/19:  Patient admits he did not  Lipitor because \"My wife doesn't really like that medicine, but I saw Cardiology yesterday, who told me to get it\". Patient will see PCP tomorrow and discuss Lipitor further. NN will f/u again in one week. 7/8/19:  Patient reports only picking up two prescriptions at discharge (Keflex and Eliquis). States he did not know about the Lipitor. Per d/c summary, all three prescriptions sent to same pharmacy. Patient will call pharmacy now and notify NN if there is a problem getting it. NN will follow up again in one week otherwise.  Supportive resources in place to maintain patient in the community (ie. Home Health, DME equipment, refer to, medication assistant plan, etc.)      7/26/19:  HH remains in place. Per patient, one more week with them. \"eyes are 98% better\". 7/16/19:  HH remains in place with [facility] who visit twice weekly. Patient reports no deficits other than \"eyes being crossed\".       7/8/19:  HH in place with [facility]

## 2019-08-05 ENCOUNTER — PATIENT OUTREACH (OUTPATIENT)
Dept: FAMILY MEDICINE CLINIC | Age: 84
End: 2019-08-05

## 2019-08-12 ENCOUNTER — PATIENT OUTREACH (OUTPATIENT)
Dept: FAMILY MEDICINE CLINIC | Age: 84
End: 2019-08-12

## 2019-08-16 ENCOUNTER — PATIENT OUTREACH (OUTPATIENT)
Dept: FAMILY MEDICINE CLINIC | Age: 84
End: 2019-08-16

## 2019-08-16 NOTE — PROGRESS NOTES
Goals      Attends follow-up appointments as directed. 8/16/19:  Still no follow up appt made with Neurology. Patient reports \"doing just fine\". 8/5/19:  Patient reports he has not scheduled follow up with Neurology. He agrees again that he will schedule. NN explained again the importance of f/u with them after having a stroke. Patient verbalized understanding. 7/26/19:  Patient saw PCP on 7/17/19 and will see again in two weeks. Has not called and scheduled neurology yet. Contact information given to patient for Neurology Clinic at [facility]. Patient agrees to call and schedule now. NN will f/u again in one week. 7/16/19:  Patient saw Cardiology today. PCP 7/17/19  Neurology - will call and schedule f/u for next week  States he had just seen Urology prior to admission. 7/8/19:  PCP 7/16/19  Neurology TBD  Urology TBD  Cardiology TBD  Patient states that his son is scheduling all of his follow up appointments. Agrees to update NN on next call.  Knowledge and adherence of prescribed medication (ie. action, side effects, missed dose, etc.).      7/26/19:  Patient has started Lipitor as agreed. Reports he was also restarted on Eliquis by his PCP.    7/16/19:  Patient admits he did not  Lipitor because \"My wife doesn't really like that medicine, but I saw Cardiology yesterday, who told me to get it\". Patient will see PCP tomorrow and discuss Lipitor further. NN will f/u again in one week. 7/8/19:  Patient reports only picking up two prescriptions at discharge (Keflex and Eliquis). States he did not know about the Lipitor. Per d/c summary, all three prescriptions sent to same pharmacy. Patient will call pharmacy now and notify NN if there is a problem getting it. NN will follow up again in one week otherwise.  Supportive resources in place to maintain patient in the community (ie.  Home Health, DME equipment, refer to, medication assistant plan, etc.) 8/5/19:  Patient reports [place] \"turned me loose\" and are no longer coming. 7/26/19:  HH remains in place. Per patient, one more week with them. \"eyes are 98% better\". 7/16/19:  HH remains in place with [facility] who visit twice weekly. Patient reports no deficits other than \"eyes being crossed\". 7/8/19:  HH in place with [facility]          This episode closed. Patient had no readmissions during 30 day JAE.

## 2019-11-06 ENCOUNTER — HOSPITAL ENCOUNTER (OUTPATIENT)
Dept: CT IMAGING | Age: 84
Discharge: HOME | End: 2019-11-06
Attending: NURSE PRACTITIONER
Payer: MEDICARE

## 2019-11-06 DIAGNOSIS — R07.9 CHEST PAIN: ICD-10-CM

## 2019-11-06 DIAGNOSIS — R94.30 ABNORMAL RESULT OF CARDIOVASCULAR FUNCTION STUDY: ICD-10-CM

## 2019-11-06 LAB — CREAT BLD-MCNC: 1.1 MG/DL (ref 0.6–1.3)

## 2019-11-06 PROCEDURE — 82565 ASSAY OF CREATININE: CPT

## 2019-11-06 PROCEDURE — 74011636320 HC RX REV CODE- 636/320: Performed by: RADIOLOGY

## 2019-11-06 PROCEDURE — 75574 CT ANGIO HRT W/3D IMAGE: CPT

## 2019-11-06 RX ORDER — IODIXANOL 320 MG/ML
150 INJECTION, SOLUTION INTRAVASCULAR
Status: COMPLETED | OUTPATIENT
Start: 2019-11-06 | End: 2019-11-06

## 2019-11-06 RX ADMIN — IODIXANOL 150 ML: 320 INJECTION, SOLUTION INTRAVASCULAR at 09:19

## 2019-11-27 ENCOUNTER — OFFICE VISIT (OUTPATIENT)
Dept: CARDIOLOGY CLINIC | Age: 84
End: 2019-11-27

## 2019-11-27 VITALS
BODY MASS INDEX: 28.85 KG/M2 | DIASTOLIC BLOOD PRESSURE: 68 MMHG | OXYGEN SATURATION: 95 % | HEART RATE: 55 BPM | HEIGHT: 72 IN | WEIGHT: 213 LBS | SYSTOLIC BLOOD PRESSURE: 147 MMHG

## 2019-11-27 DIAGNOSIS — I63.9 CEREBROVASCULAR ACCIDENT (CVA), UNSPECIFIED MECHANISM (HCC): Primary | ICD-10-CM

## 2019-11-27 DIAGNOSIS — I10 ESSENTIAL HYPERTENSION: ICD-10-CM

## 2019-11-27 DIAGNOSIS — I82.890 SPLENIC VEIN THROMBOSIS: ICD-10-CM

## 2019-11-27 RX ORDER — AMLODIPINE BESYLATE 2.5 MG/1
2.5 TABLET ORAL DAILY
Qty: 90 TAB | Refills: 3 | Status: SHIPPED | OUTPATIENT
Start: 2019-11-27 | End: 2020-08-06

## 2019-11-27 RX ORDER — LOSARTAN POTASSIUM 25 MG/1
TABLET ORAL DAILY
COMMUNITY
End: 2020-08-06

## 2019-11-27 RX ORDER — CHOLECALCIFEROL (VITAMIN D3) 125 MCG
100 CAPSULE ORAL DAILY
COMMUNITY

## 2019-11-27 RX ORDER — ROSUVASTATIN CALCIUM 10 MG/1
10 TABLET, COATED ORAL
COMMUNITY

## 2019-11-27 NOTE — PROGRESS NOTES
Chief Complaint   Patient presents with    New Patient    Coronary Artery Disease    Abnormal Cardiac Test    Other     Hx CVA     Visit Vitals  /68 (BP 1 Location: Left arm, BP Patient Position: Sitting)   Pulse (!) 55   Ht 6' (1.829 m)   Wt 213 lb (96.6 kg)   SpO2 95%   BMI 28.89 kg/m²     Chest pain all my life; not my heart though.   When I was 20, I pulled a mx from weight lifting  Balance problems since last Dec  Recent hospital visit July 3 had a small stroke; last Nov 2018 blood clot in spleen

## 2019-11-27 NOTE — PROGRESS NOTES
Negra Darden MD. [facility]              Patient: Darral Leyden  : 1935      Today's Date: 2019            HISTORY OF PRESENT ILLNESS:     History of Present Illness:  Here to establish care since he gets care at [facility]. He has seen Dr. Mely Geiegr in past for LBBB and recently seen Dr. Tao Vuong. Denies history of MI, CHF, or Afib. Had a CVA in July. Doing OK lately other than poor balance after splenic infarcts in . No CP or SOB. PAST MEDICAL HISTORY:     Past Medical History:   Diagnosis Date    Benign hypertension     Chest pain, unspecified     Dyspnea     Hypertension     LBBB (left bundle branch block)     Palpitations     Splenic infarct     splenic infarcts     Splenic vein thrombosis         Stroke ([facility] [address]     Brain MRI 7/3/19 - Small left pontine and left midbrain acute infarctions.  Vertigo        Past Surgical History:   Procedure Laterality Date    HX TONSIL AND ADENOIDECTOMY             MEDICATIONS:     Current Outpatient Medications   Medication Sig Dispense Refill    rosuvastatin (CRESTOR) 10 mg tablet Take 10 mg by mouth nightly.  losartan (COZAAR) 25 mg tablet Take  by mouth daily.  co-enzyme Q-10 (CO Q-10) 100 mg capsule Take 100 mg by mouth daily.  amLODIPine (NORVASC) 2.5 mg tablet Take 1 Tab by mouth daily. 90 Tab 3    apixaban (ELIQUIS) 5 mg tablet Take 1 Tab by mouth two (2) times a day. Indications: CVA, ?PAF, recommended by cardiology and neurology 60 Tab 0    ascorbic acid, vitamin C, (VITAMIN C) 500 mg tablet Take 500 mg by mouth daily.  acetaminophen (TYLENOL) 325 mg tablet Take 2 Tabs by mouth every six (6) hours as needed.          Allergies   Allergen Reactions    Pcn [Penicillins] Unknown (comments)             SOCIAL HISTORY:     Social History     Tobacco Use    Smoking status: Never Smoker    Smokeless tobacco: Never Used   Substance Use Topics    Alcohol use: No    Drug use: Never         FAMILY HISTORY:     Family History   Problem Relation Age of Onset   [facility] Cancer Mother     Stroke Mother     Cancer Father              REVIEW OF SYMPTOMS:     Review of Symptoms:  Constitutional: Negative for fever, chills  HEENT: Negative for nosebleeds, tinnitus, and vision changes. Respiratory: Negative for cough, wheezing  Cardiovascular: Negative for orthopnea, claudication, syncope, and PND. Gastrointestinal: Negative for abdominal pain, diarrhea, melena. Genitourinary: Negative for dysuria  Musculoskeletal: Negative for myalgias. Skin: Negative for rash  Heme: No problems bleeding. Neurological: Negative for speech change and focal weakness. + poor balance       PHYSICAL EXAM:     Physical Exam:  Visit Vitals  /68 (BP 1 Location: Left arm, BP Patient Position: Sitting)   Pulse (!) 55   Ht 6' (1.829 m)   Wt 213 lb (96.6 kg)   SpO2 95%   BMI 28.89 kg/m²     Patient appears generally well, mood and affect are appropriate and pleasant. HEENT:  Hearing intact, non-icteric, normocephalic, atraumatic. Neck Exam: Supple, No JVD or carotid bruits. Lung Exam: Clear to auscultation, even breath sounds. Cardiac Exam: Regular rate and rhythm with no murmur or rub  Abdomen: Soft, non-tender, normal bowel sounds. No bruits or masses. Extremities: Moves all ext well. No lower extremity edema. Vascular: 2+ dorsalis pedis pulses bilaterally.   Psych: Appropriate affect  Neuro - Grossly intact      LABS / OTHER STUDIES:     Lab Results   Component Value Date/Time    Sodium 140 07/05/2019 03:43 AM    Potassium 3.7 07/05/2019 03:43 AM    Chloride 110 (H) 07/05/2019 03:43 AM    CO2 25 07/05/2019 03:43 AM    Anion gap 5 07/05/2019 03:43 AM    Glucose 96 07/05/2019 03:43 AM    BUN 26 (H) 07/05/2019 03:43 AM    Creatinine 1.21 07/05/2019 03:43 AM    BUN/Creatinine ratio 21 (H) 07/05/2019 03:43 AM    GFR est AA >60 07/05/2019 03:43 AM    GFR est non-AA 57 (L) 07/05/2019 03:43 AM    Calcium 8.2 (L) 07/05/2019 03:43 AM    Bilirubin, total 0.7 07/04/2019 07:32 AM    AST (SGOT) 23 07/04/2019 07:32 AM    Alk. phosphatase 125 (H) 07/04/2019 07:32 AM    Protein, total 6.3 (L) 07/04/2019 07:32 AM    Albumin 3.0 (L) 07/05/2019 03:43 AM    Globulin 3.1 07/04/2019 07:32 AM    A-G Ratio 1.0 (L) 07/04/2019 07:32 AM    ALT (SGPT) 26 07/04/2019 07:32 AM     Lab Results   Component Value Date/Time    WBC 8.7 07/05/2019 03:43 AM    HGB 14.3 07/05/2019 03:43 AM    HCT 42.1 07/05/2019 03:43 AM    PLATELET 454 63/19/0225 03:43 AM    MCV 94.0 07/05/2019 03:43 AM       Lab Results   Component Value Date/Time    Cholesterol, total 118 07/04/2019 07:32 AM    HDL Cholesterol 45 07/04/2019 07:32 AM    LDL, calculated 57.8 07/04/2019 07:32 AM    VLDL, calculated 15.2 07/04/2019 07:32 AM    Triglyceride 76 07/04/2019 07:32 AM    CHOL/HDL Ratio 2.6 07/04/2019 07:32 AM               CARDIAC DIAGNOSTICS:     Cardiac Evaluation Includes:    CTA Abd 12/11/18 - Geographic areas of low attenuation in the spleen suggesting infarcts. Echo 12/18 - LVEF 55-60%    CTA 4/8/19 - Areas of scarring are noted in the spleen compatible with healing infarcts. Evidence  of chronic splenic vein thrombosis. Marked prostate enlargement with nodule. Echo 7/4/19 - LVEF 55%, negative bubble study   RICKY 7/5/19 - LVEF 60%    Brain MRI 7/3/19 - Small left pontine and left midbrain acute infarctions. Coronary CTA 11/6/19 - CAC Score 70. Mild disease.         EKG 12/11/18 - NSR, LBBB        ASSESSMENT AND PLAN:     Assessment and Plan:  1) CVA  - Acute CVA 7/19 - Small left pontine and left midbrain acute infarctions  - Given his prior splenic infarcts, worry about a hypercoagulable condition  - Have emailed Dr. Meenakshi Peguero to see if he has hematological reason for long term [address]  - Will consider ILR to look for afib if no other reason for [address]  - cont statin   - Will continue Eliquis (worry about an embolic event)     2) Splenic Vein Thrombosis 12/18  - saw Dr. Meenakshi Peguero     3) Mild atherosclerosis on Coronary CTA  - cont statin   - Will get prior records from Dr. Sam Gutierrez     4) HTN  - SBP > 140 at home - add amlodipine to losartan and follow BP at home - ideal goal < 130/80     5) Vertigo   - he has had poor balance since 12/18 (splenic infarcts) - has seen ENT for this and treated for BPPV but problems persisted. 6) See me in 6 months.   Patient expressed understanding of the plan - questions were answered. Retired . Has 4 kids. Jimena Luna MD, [address]  [address]  Ph: [phone]   Ph [phone]        ADDENDUM   12/2/2019  Dr. Yesenia Morgan emailed \"Looks like he cancelled his follow up appt with me. Xin Amanda looked through his chart just now, and his thrombophilia labs all looked fine.  I don't see anything that would warrant long-term anticoagulation.  Generally for splenic vein thrombosis, we'll just do 6 months of anticoagulation. Though the CVA certainly complicates things. \"    Will continue [facility] for now - MRI showed Small left pontine and left midbrain acute infarctions -  still worried about an embolic event. ADDENDUM   1/5/2020  [facility] records obtained.      Echo 4/9/15 - LVEF 40% (EKG showed a LBBB)   Echo 6/22/16 - LVEF 50%  Holter 12/28/18 - normal study   Echo 12/31/18 - LVEF 45%

## 2020-08-06 ENCOUNTER — OFFICE VISIT (OUTPATIENT)
Dept: CARDIOLOGY CLINIC | Age: 85
End: 2020-08-06
Payer: MEDICARE

## 2020-08-06 VITALS
DIASTOLIC BLOOD PRESSURE: 68 MMHG | WEIGHT: 207 LBS | HEART RATE: 57 BPM | OXYGEN SATURATION: 97 % | HEIGHT: 72 IN | BODY MASS INDEX: 28.04 KG/M2 | SYSTOLIC BLOOD PRESSURE: 132 MMHG

## 2020-08-06 DIAGNOSIS — I10 ESSENTIAL HYPERTENSION: ICD-10-CM

## 2020-08-06 DIAGNOSIS — I44.7 LBBB (LEFT BUNDLE BRANCH BLOCK): ICD-10-CM

## 2020-08-06 DIAGNOSIS — I63.9 CEREBROVASCULAR ACCIDENT (CVA), UNSPECIFIED MECHANISM (HCC): Primary | ICD-10-CM

## 2020-08-06 PROCEDURE — G8427 DOCREV CUR MEDS BY ELIG CLIN: HCPCS | Performed by: SPECIALIST

## 2020-08-06 PROCEDURE — 99214 OFFICE O/P EST MOD 30 MIN: CPT | Performed by: SPECIALIST

## 2020-08-06 PROCEDURE — G8752 SYS BP LESS 140: HCPCS | Performed by: SPECIALIST

## 2020-08-06 PROCEDURE — G8432 DEP SCR NOT DOC, RNG: HCPCS | Performed by: SPECIALIST

## 2020-08-06 PROCEDURE — G8536 NO DOC ELDER MAL SCRN: HCPCS | Performed by: SPECIALIST

## 2020-08-06 PROCEDURE — 93000 ELECTROCARDIOGRAM COMPLETE: CPT | Performed by: SPECIALIST

## 2020-08-06 PROCEDURE — G8419 CALC BMI OUT NRM PARAM NOF/U: HCPCS | Performed by: SPECIALIST

## 2020-08-06 PROCEDURE — G8754 DIAS BP LESS 90: HCPCS | Performed by: SPECIALIST

## 2020-08-06 PROCEDURE — 1101F PT FALLS ASSESS-DOCD LE1/YR: CPT | Performed by: SPECIALIST

## 2020-08-06 RX ORDER — VALSARTAN AND HYDROCHLOROTHIAZIDE 320; 25 MG/1; MG/1
0.5 TABLET, FILM COATED ORAL DAILY
Qty: 30 TAB | Refills: 0
Start: 2020-08-06 | End: 2020-09-30

## 2020-08-06 RX ORDER — VALSARTAN AND HYDROCHLOROTHIAZIDE 320; 25 MG/1; MG/1
1 TABLET, FILM COATED ORAL DAILY
COMMUNITY
End: 2020-08-06 | Stop reason: SDUPTHER

## 2020-08-06 NOTE — PROGRESS NOTES
Jonny Tee is a 82 y.o. male    Visit Vitals  /68 (BP 1 Location: Left arm, BP Patient Position: Sitting)   Pulse (!) 57   Ht 6' (1.829 m)   Wt 207 lb (93.9 kg)   SpO2 97%   BMI 28.07 kg/m²       Chief Complaint   Patient presents with    Other     CVA    Hypertension    Other     VEIN THROMBOSIS       Chest pain NO  SOB NO  Dizziness NO  Swelling NO  Recent hospital visit NO  Refills NO

## 2020-08-06 NOTE — PROGRESS NOTES
Katy Sommer MD. [facility]              Patient: Divine Christensen  : 1935      Today's Date: 2020          HISTORY OF PRESENT ILLNESS:     History of Present Illness:  Here for follow-up. Doing well. No complaints. Just aging complaints. A little unstable on his feet - at risk of falling - since his stroke. Stays active. No CP or SOB. PAST MEDICAL HISTORY:     Past Medical History:   Diagnosis Date    Benign hypertension     Chest pain, unspecified     Dyspnea     Hypertension     LBBB (left bundle branch block)     Palpitations     Splenic infarct     splenic infarcts     Splenic vein thrombosis         Stroke ([facility] [address]     Brain MRI 7/3/19 - Small left pontine and left midbrain acute infarctions.  Vertigo        Past Surgical History:   Procedure Laterality Date    HX TONSIL AND ADENOIDECTOMY             MEDICATIONS:     Current Outpatient Medications   Medication Sig Dispense Refill    valsartan-hydroCHLOROthiazide (DIOVAN-HCT) 320-25 mg per tablet Take 0.5 Tabs by mouth daily. 30 Tab 0    rosuvastatin (CRESTOR) 10 mg tablet Take 10 mg by mouth nightly.  co-enzyme Q-10 (CO Q-10) 100 mg capsule Take 100 mg by mouth daily.  apixaban (ELIQUIS) 5 mg tablet Take 1 Tab by mouth two (2) times a day. Indications: CVA, ?PAF, recommended by cardiology and neurology 60 Tab 0    ascorbic acid, vitamin C, (VITAMIN C) 500 mg tablet Take 500 mg by mouth daily.  acetaminophen (TYLENOL) 325 mg tablet Take 2 Tabs by mouth every six (6) hours as needed.          Allergies   Allergen Reactions    Pcn [Penicillins] Unknown (comments)           SOCIAL HISTORY:     Social History     Tobacco Use    Smoking status: Never Smoker    Smokeless tobacco: Never Used   Substance Use Topics    Alcohol use: No    Drug use: Never         FAMILY HISTORY:     Family History   Problem Relation Age of Onset    Cancer Mother     Stroke Mother     Cancer Father    REVIEW OF SYMPTOMS:      Review of Symptoms:  Constitutional: Negative for fever, chills  HEENT: Negative for nosebleeds, tinnitus, and vision changes. Respiratory: Negative for cough, wheezing  Cardiovascular: Negative for orthopnea, claudication, syncope, and PND. Gastrointestinal: Negative for abdominal pain, diarrhea, melena. Genitourinary: Negative for dysuria  Musculoskeletal: Negative for myalgias. Skin: Negative for rash  Heme: No problems bleeding. Neurological: Negative for speech change and focal weakness. + poor balance         PHYSICAL EXAM:      Physical Exam:  Visit Vitals  /68 (BP 1 Location: Left arm, BP Patient Position: Sitting)   Pulse (!) 57   Ht 6' (1.829 m)   Wt 207 lb (93.9 kg)   SpO2 97%   BMI 28.07 kg/m²       Patient appears generally well, mood and affect are appropriate and pleasant. HEENT:  Hearing intact, non-icteric, normocephalic, atraumatic. Neck Exam: Supple, No JVD or carotid bruits. Lung Exam: Clear to auscultation, even breath sounds. Cardiac Exam: Regular rate and rhythm with no murmur or rub  Abdomen: Soft, non-tender, normal bowel sounds. No bruits or masses. Extremities: Moves all ext well. No lower extremity edema. Vascular: 2+ dorsalis pedis pulses bilaterally. Psych: Appropriate affect  Neuro - Grossly intact        LABS / OTHER STUDIES:      Lab Results   Component Value Date/Time    Sodium 140 07/05/2019 03:43 AM    Potassium 3.7 07/05/2019 03:43 AM    Chloride 110 (H) 07/05/2019 03:43 AM    CO2 25 07/05/2019 03:43 AM    Anion gap 5 07/05/2019 03:43 AM    Glucose 96 07/05/2019 03:43 AM    BUN 26 (H) 07/05/2019 03:43 AM    Creatinine 1.21 07/05/2019 03:43 AM    BUN/Creatinine ratio 21 (H) 07/05/2019 03:43 AM    GFR est AA >60 07/05/2019 03:43 AM    GFR est non-AA 57 (L) 07/05/2019 03:43 AM    Calcium 8.2 (L) 07/05/2019 03:43 AM    Bilirubin, total 0.7 07/04/2019 07:32 AM    Alk.  phosphatase 125 (H) 07/04/2019 07:32 AM    Protein, total 6.3 (L) 07/04/2019 07:32 AM    Albumin 3.0 (L) 07/05/2019 03:43 AM    Globulin 3.1 07/04/2019 07:32 AM    A-G Ratio 1.0 (L) 07/04/2019 07:32 AM    ALT (SGPT) 26 07/04/2019 07:32 AM    AST (SGOT) 23 07/04/2019 07:32 AM     Lab Results   Component Value Date/Time    WBC 8.7 07/05/2019 03:43 AM    HGB 14.3 07/05/2019 03:43 AM    HCT 42.1 07/05/2019 03:43 AM    PLATELET 667 33/14/5075 03:43 AM    MCV 94.0 07/05/2019 03:43 AM       Lab Results   Component Value Date/Time    Cholesterol, total 118 07/04/2019 07:32 AM    HDL Cholesterol 45 07/04/2019 07:32 AM    LDL, calculated 57.8 07/04/2019 07:32 AM    VLDL, calculated 15.2 07/04/2019 07:32 AM    Triglyceride 76 07/04/2019 07:32 AM    CHOL/HDL Ratio 2.6 07/04/2019 07:32 AM                CARDIAC DIAGNOSTICS:      Cardiac Evaluation Includes:       Echo 4/9/15 - LVEF 40% (EKG showed a LBBB)   Echo 6/22/16 - LVEF 50%  Holter 12/28/18 - normal study   Echo 12/31/18 - LVEF 45%      CTA Abd 12/11/18 - Geographic areas of low attenuation in the spleen suggesting infarcts. Echo 12/18 - LVEF 55-60%     CTA 4/8/19 - Areas of scarring are noted in the spleen compatible with healing infarcts. Evidence  of chronic splenic vein thrombosis. Marked prostate enlargement with nodule.     Echo 7/4/19 - LVEF 55%, negative bubble study   RICKY 7/5/19 - LVEF 60%     Brain MRI 7/3/19 - Small left pontine and left midbrain acute infarctions.     Coronary CTA 11/6/19 - CAC Score 70.   Mild disease.            EKG 12/11/18 - NSR, LBBB  EKG 8/6/20 - NSR, LBBB           ASSESSMENT AND PLAN:      Assessment and Plan:  1) CVA  - Acute CVA 7/19 - Small left pontine and left midbrain acute infarctions  - Has poor balance since his stroke but still able to stay active   - Given his prior splenic infarcts, worry about a hypercoagulable condition  - Dr. Robb PrintSegopotso emailed \"Looks like he cancelled his follow up appt with me. Lina Gunn looked through his chart just now, and his thrombophilia labs all looked fine.  I don't see anything that would warrant long-term anticoagulation.  Generally for splenic vein thrombosis, we'll just do 6 months of anticoagulation. Though the CVA certainly complicates things. 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 - I talked to him about ILR to document afib but he prefers to just continue [address] and not bother with ILT  - cont statin      2) Splenic Vein Thrombosis 12/18  - saw Dr. Kecia Diehl      3) Mild atherosclerosis on Coronary CTA and dyslipidemia   - cont statin   - lipids followed      4) HTN  - BP looks good at home - SBP < 130/85   - continue meds      5) Vertigo   - he has had poor balance since 12/18 (splenic infarcts) - has seen ENT for this and treated for BPPV but problems persisted.      6) See me in 12 months.   Patient expressed understanding of the plan - questions were answered. Retired . Has 4 kids.         Mike Banda MD, [address] [place] [address]     [address]  [address], [address]  Ph: [phone]                               Ph [phone]

## 2020-09-30 ENCOUNTER — APPOINTMENT (OUTPATIENT)
Dept: CT IMAGING | Age: 85
DRG: 417 | End: 2020-09-30
Attending: STUDENT IN AN ORGANIZED HEALTH CARE EDUCATION/TRAINING PROGRAM
Payer: MEDICARE

## 2020-09-30 ENCOUNTER — APPOINTMENT (OUTPATIENT)
Dept: ULTRASOUND IMAGING | Age: 85
DRG: 417 | End: 2020-09-30
Attending: STUDENT IN AN ORGANIZED HEALTH CARE EDUCATION/TRAINING PROGRAM
Payer: MEDICARE

## 2020-09-30 ENCOUNTER — HOSPITAL ENCOUNTER (INPATIENT)
Age: 85
LOS: 4 days | Discharge: HOME HEALTH | DRG: 417 | End: 2020-10-05
Attending: STUDENT IN AN ORGANIZED HEALTH CARE EDUCATION/TRAINING PROGRAM | Admitting: SURGERY
Payer: MEDICARE

## 2020-09-30 DIAGNOSIS — Z90.49 S/P LAPAROSCOPIC CHOLECYSTECTOMY: ICD-10-CM

## 2020-09-30 DIAGNOSIS — K80.50 BILIARY COLIC: Primary | ICD-10-CM

## 2020-09-30 DIAGNOSIS — K80.00 ACUTE CALCULOUS CHOLECYSTITIS: ICD-10-CM

## 2020-09-30 DIAGNOSIS — R10.826 EPIGASTRIC ABDOMINAL TENDERNESS WITH REBOUND TENDERNESS: ICD-10-CM

## 2020-09-30 DIAGNOSIS — I63.9 CEREBROVASCULAR ACCIDENT (CVA), UNSPECIFIED MECHANISM (HCC): ICD-10-CM

## 2020-09-30 DIAGNOSIS — Z79.01 ANTICOAGULANT LONG-TERM USE: ICD-10-CM

## 2020-09-30 LAB
ALBUMIN SERPL-MCNC: 4.3 G/DL (ref 3.5–5)
ALBUMIN/GLOB SERPL: 1.1 {RATIO} (ref 1.1–2.2)
ALP SERPL-CCNC: 113 U/L (ref 45–117)
ALT SERPL-CCNC: 30 U/L (ref 12–78)
ANION GAP SERPL CALC-SCNC: 5 MMOL/L (ref 5–15)
ANION GAP SERPL CALC-SCNC: 7 MMOL/L (ref 5–15)
APPEARANCE UR: CLEAR
AST SERPL-CCNC: 17 U/L (ref 15–37)
ATRIAL RATE: 56 BPM
BACTERIA URNS QL MICRO: NEGATIVE /HPF
BASOPHILS # BLD: 0.1 K/UL (ref 0–0.1)
BASOPHILS NFR BLD: 1 % (ref 0–1)
BILIRUB SERPL-MCNC: 0.8 MG/DL (ref 0.2–1)
BILIRUB UR QL: NEGATIVE
BUN SERPL-MCNC: 25 MG/DL (ref 6–20)
BUN SERPL-MCNC: 26 MG/DL (ref 6–20)
BUN/CREAT SERPL: 18 (ref 12–20)
BUN/CREAT SERPL: 19 (ref 12–20)
CALCIUM SERPL-MCNC: 8.8 MG/DL (ref 8.5–10.1)
CALCIUM SERPL-MCNC: 9.2 MG/DL (ref 8.5–10.1)
CALCULATED P AXIS, ECG09: 70 DEGREES
CALCULATED R AXIS, ECG10: 14 DEGREES
CALCULATED T AXIS, ECG11: 110 DEGREES
CHLORIDE SERPL-SCNC: 103 MMOL/L (ref 97–108)
CHLORIDE SERPL-SCNC: 104 MMOL/L (ref 97–108)
CO2 SERPL-SCNC: 27 MMOL/L (ref 21–32)
CO2 SERPL-SCNC: 30 MMOL/L (ref 21–32)
COLOR UR: ABNORMAL
COMMENT, HOLDF: NORMAL
CREAT SERPL-MCNC: 1.31 MG/DL (ref 0.7–1.3)
CREAT SERPL-MCNC: 1.48 MG/DL (ref 0.7–1.3)
DIAGNOSIS, 93000: NORMAL
DIFFERENTIAL METHOD BLD: ABNORMAL
EOSINOPHIL # BLD: 0 K/UL (ref 0–0.4)
EOSINOPHIL NFR BLD: 0 % (ref 0–7)
EPITH CASTS URNS QL MICRO: ABNORMAL /LPF
ERYTHROCYTE [DISTWIDTH] IN BLOOD BY AUTOMATED COUNT: 12.6 % (ref 11.5–14.5)
GLOBULIN SER CALC-MCNC: 4 G/DL (ref 2–4)
GLUCOSE SERPL-MCNC: 141 MG/DL (ref 65–100)
GLUCOSE SERPL-MCNC: 148 MG/DL (ref 65–100)
GLUCOSE UR STRIP.AUTO-MCNC: NEGATIVE MG/DL
HCT VFR BLD AUTO: 53.7 % (ref 36.6–50.3)
HGB BLD-MCNC: 17.9 G/DL (ref 12.1–17)
HGB UR QL STRIP: NEGATIVE
IMM GRANULOCYTES # BLD AUTO: 0 K/UL (ref 0–0.04)
IMM GRANULOCYTES NFR BLD AUTO: 1 % (ref 0–0.5)
KETONES UR QL STRIP.AUTO: NEGATIVE MG/DL
LACTATE BLD-SCNC: 1.47 MMOL/L (ref 0.4–2)
LEUKOCYTE ESTERASE UR QL STRIP.AUTO: ABNORMAL
LIPASE SERPL-CCNC: 179 U/L (ref 73–393)
LYMPHOCYTES # BLD: 1.8 K/UL (ref 0.8–3.5)
LYMPHOCYTES NFR BLD: 21 % (ref 12–49)
MCH RBC QN AUTO: 32.5 PG (ref 26–34)
MCHC RBC AUTO-ENTMCNC: 33.3 G/DL (ref 30–36.5)
MCV RBC AUTO: 97.5 FL (ref 80–99)
MONOCYTES # BLD: 0.5 K/UL (ref 0–1)
MONOCYTES NFR BLD: 6 % (ref 5–13)
NEUTS SEG # BLD: 6.2 K/UL (ref 1.8–8)
NEUTS SEG NFR BLD: 71 % (ref 32–75)
NITRITE UR QL STRIP.AUTO: POSITIVE
NRBC # BLD: 0 K/UL (ref 0–0.01)
NRBC BLD-RTO: 0 PER 100 WBC
P-R INTERVAL, ECG05: 232 MS
PH UR STRIP: 6.5 [PH] (ref 5–8)
PLATELET # BLD AUTO: 169 K/UL (ref 150–400)
PMV BLD AUTO: 9.6 FL (ref 8.9–12.9)
POTASSIUM SERPL-SCNC: 3.8 MMOL/L (ref 3.5–5.1)
POTASSIUM SERPL-SCNC: 4 MMOL/L (ref 3.5–5.1)
PROT SERPL-MCNC: 8.3 G/DL (ref 6.4–8.2)
PROT UR STRIP-MCNC: 100 MG/DL
Q-T INTERVAL, ECG07: 482 MS
QRS DURATION, ECG06: 150 MS
QTC CALCULATION (BEZET), ECG08: 493 MS
RBC # BLD AUTO: 5.51 M/UL (ref 4.1–5.7)
RBC #/AREA URNS HPF: ABNORMAL /HPF (ref 0–5)
SAMPLES BEING HELD,HOLD: NORMAL
SODIUM SERPL-SCNC: 138 MMOL/L (ref 136–145)
SODIUM SERPL-SCNC: 138 MMOL/L (ref 136–145)
SP GR UR REFRACTOMETRY: 1.02 (ref 1–1.03)
TROPONIN I SERPL-MCNC: <0.05 NG/ML
UR CULT HOLD, URHOLD: NORMAL
UROBILINOGEN UR QL STRIP.AUTO: 1 EU/DL (ref 0.2–1)
VENTRICULAR RATE, ECG03: 63 BPM
WBC # BLD AUTO: 8.5 K/UL (ref 4.1–11.1)
WBC URNS QL MICRO: ABNORMAL /HPF (ref 0–4)

## 2020-09-30 PROCEDURE — 74011250636 HC RX REV CODE- 250/636: Performed by: STUDENT IN AN ORGANIZED HEALTH CARE EDUCATION/TRAINING PROGRAM

## 2020-09-30 PROCEDURE — 74011250637 HC RX REV CODE- 250/637: Performed by: EMERGENCY MEDICINE

## 2020-09-30 PROCEDURE — 99285 EMERGENCY DEPT VISIT HI MDM: CPT

## 2020-09-30 PROCEDURE — 96361 HYDRATE IV INFUSION ADD-ON: CPT

## 2020-09-30 PROCEDURE — 83605 ASSAY OF LACTIC ACID: CPT

## 2020-09-30 PROCEDURE — 96375 TX/PRO/DX INJ NEW DRUG ADDON: CPT

## 2020-09-30 PROCEDURE — 84484 ASSAY OF TROPONIN QUANT: CPT

## 2020-09-30 PROCEDURE — 74011250636 HC RX REV CODE- 250/636: Performed by: SURGERY

## 2020-09-30 PROCEDURE — 76705 ECHO EXAM OF ABDOMEN: CPT

## 2020-09-30 PROCEDURE — 74177 CT ABD & PELVIS W/CONTRAST: CPT

## 2020-09-30 PROCEDURE — 99284 EMERGENCY DEPT VISIT MOD MDM: CPT | Performed by: SURGERY

## 2020-09-30 PROCEDURE — 99218 HC RM OBSERVATION: CPT

## 2020-09-30 PROCEDURE — 74011000250 HC RX REV CODE- 250: Performed by: STUDENT IN AN ORGANIZED HEALTH CARE EDUCATION/TRAINING PROGRAM

## 2020-09-30 PROCEDURE — 96374 THER/PROPH/DIAG INJ IV PUSH: CPT

## 2020-09-30 PROCEDURE — 77030027138 HC INCENT SPIROMETER -A

## 2020-09-30 PROCEDURE — 74176 CT ABD & PELVIS W/O CONTRAST: CPT

## 2020-09-30 PROCEDURE — 80053 COMPREHEN METABOLIC PANEL: CPT

## 2020-09-30 PROCEDURE — 99222 1ST HOSP IP/OBS MODERATE 55: CPT | Performed by: STUDENT IN AN ORGANIZED HEALTH CARE EDUCATION/TRAINING PROGRAM

## 2020-09-30 PROCEDURE — 74011000636 HC RX REV CODE- 636: Performed by: RADIOLOGY

## 2020-09-30 PROCEDURE — 2709999900 HC NON-CHARGEABLE SUPPLY

## 2020-09-30 PROCEDURE — 36415 COLL VENOUS BLD VENIPUNCTURE: CPT

## 2020-09-30 PROCEDURE — 74011250637 HC RX REV CODE- 250/637: Performed by: STUDENT IN AN ORGANIZED HEALTH CARE EDUCATION/TRAINING PROGRAM

## 2020-09-30 PROCEDURE — 81001 URINALYSIS AUTO W/SCOPE: CPT

## 2020-09-30 PROCEDURE — 74011250637 HC RX REV CODE- 250/637: Performed by: SURGERY

## 2020-09-30 PROCEDURE — 85025 COMPLETE CBC W/AUTO DIFF WBC: CPT

## 2020-09-30 PROCEDURE — 96376 TX/PRO/DX INJ SAME DRUG ADON: CPT

## 2020-09-30 PROCEDURE — 77030040361 HC SLV COMPR DVT MDII -B

## 2020-09-30 PROCEDURE — 83690 ASSAY OF LIPASE: CPT

## 2020-09-30 PROCEDURE — 93005 ELECTROCARDIOGRAM TRACING: CPT

## 2020-09-30 RX ORDER — FAMOTIDINE 20 MG/1
20 TABLET, FILM COATED ORAL
Status: COMPLETED | OUTPATIENT
Start: 2020-09-30 | End: 2020-09-30

## 2020-09-30 RX ORDER — NALOXONE HYDROCHLORIDE 0.4 MG/ML
0.4 INJECTION, SOLUTION INTRAMUSCULAR; INTRAVENOUS; SUBCUTANEOUS AS NEEDED
Status: DISCONTINUED | OUTPATIENT
Start: 2020-09-30 | End: 2020-10-05 | Stop reason: HOSPADM

## 2020-09-30 RX ORDER — MORPHINE SULFATE 4 MG/ML
4 INJECTION INTRAVENOUS ONCE
Status: COMPLETED | OUTPATIENT
Start: 2020-09-30 | End: 2020-09-30

## 2020-09-30 RX ORDER — HYDROCODONE BITARTRATE AND ACETAMINOPHEN 5; 325 MG/1; MG/1
1 TABLET ORAL
Status: DISCONTINUED | OUTPATIENT
Start: 2020-09-30 | End: 2020-10-05 | Stop reason: HOSPADM

## 2020-09-30 RX ORDER — ROSUVASTATIN CALCIUM 10 MG/1
10 TABLET, COATED ORAL
Status: DISCONTINUED | OUTPATIENT
Start: 2020-09-30 | End: 2020-10-05 | Stop reason: HOSPADM

## 2020-09-30 RX ORDER — PROCHLORPERAZINE EDISYLATE 5 MG/ML
5 INJECTION INTRAMUSCULAR; INTRAVENOUS
Status: DISCONTINUED | OUTPATIENT
Start: 2020-09-30 | End: 2020-10-05 | Stop reason: HOSPADM

## 2020-09-30 RX ORDER — LOSARTAN POTASSIUM AND HYDROCHLOROTHIAZIDE 12.5; 5 MG/1; MG/1
1 TABLET ORAL DAILY
COMMUNITY
End: 2021-01-11

## 2020-09-30 RX ORDER — HYDROMORPHONE HYDROCHLORIDE 1 MG/ML
1 INJECTION, SOLUTION INTRAMUSCULAR; INTRAVENOUS; SUBCUTANEOUS ONCE
Status: COMPLETED | OUTPATIENT
Start: 2020-09-30 | End: 2020-09-30

## 2020-09-30 RX ORDER — HYDROMORPHONE HYDROCHLORIDE 1 MG/ML
1 INJECTION, SOLUTION INTRAMUSCULAR; INTRAVENOUS; SUBCUTANEOUS
Status: DISCONTINUED | OUTPATIENT
Start: 2020-09-30 | End: 2020-10-05 | Stop reason: HOSPADM

## 2020-09-30 RX ORDER — ACETAMINOPHEN 325 MG/1
650 TABLET ORAL
Status: DISCONTINUED | OUTPATIENT
Start: 2020-09-30 | End: 2020-10-05 | Stop reason: HOSPADM

## 2020-09-30 RX ORDER — LOSARTAN POTASSIUM 50 MG/1
50 TABLET ORAL DAILY
Status: DISCONTINUED | OUTPATIENT
Start: 2020-10-01 | End: 2020-10-05 | Stop reason: HOSPADM

## 2020-09-30 RX ORDER — HYDROMORPHONE HYDROCHLORIDE 1 MG/ML
1 INJECTION, SOLUTION INTRAMUSCULAR; INTRAVENOUS; SUBCUTANEOUS
Status: COMPLETED | OUTPATIENT
Start: 2020-09-30 | End: 2020-09-30

## 2020-09-30 RX ORDER — LEVOFLOXACIN 5 MG/ML
500 INJECTION, SOLUTION INTRAVENOUS EVERY 24 HOURS
Status: DISCONTINUED | OUTPATIENT
Start: 2020-09-30 | End: 2020-10-05 | Stop reason: HOSPADM

## 2020-09-30 RX ORDER — OXYCODONE AND ACETAMINOPHEN 5; 325 MG/1; MG/1
2 TABLET ORAL
Status: COMPLETED | OUTPATIENT
Start: 2020-09-30 | End: 2020-09-30

## 2020-09-30 RX ORDER — ONDANSETRON 2 MG/ML
4 INJECTION INTRAMUSCULAR; INTRAVENOUS
Status: DISCONTINUED | OUTPATIENT
Start: 2020-09-30 | End: 2020-10-05 | Stop reason: HOSPADM

## 2020-09-30 RX ORDER — HYDRALAZINE HYDROCHLORIDE 20 MG/ML
10 INJECTION INTRAMUSCULAR; INTRAVENOUS
Status: DISCONTINUED | OUTPATIENT
Start: 2020-09-30 | End: 2020-10-05 | Stop reason: HOSPADM

## 2020-09-30 RX ORDER — SODIUM CHLORIDE 0.9 % (FLUSH) 0.9 %
5-40 SYRINGE (ML) INJECTION AS NEEDED
Status: DISCONTINUED | OUTPATIENT
Start: 2020-09-30 | End: 2020-10-05 | Stop reason: HOSPADM

## 2020-09-30 RX ORDER — ONDANSETRON 2 MG/ML
4 INJECTION INTRAMUSCULAR; INTRAVENOUS
Status: COMPLETED | OUTPATIENT
Start: 2020-09-30 | End: 2020-09-30

## 2020-09-30 RX ORDER — SODIUM CHLORIDE 0.9 % (FLUSH) 0.9 %
5-40 SYRINGE (ML) INJECTION EVERY 8 HOURS
Status: DISCONTINUED | OUTPATIENT
Start: 2020-09-30 | End: 2020-10-05 | Stop reason: HOSPADM

## 2020-09-30 RX ORDER — SODIUM CHLORIDE, SODIUM LACTATE, POTASSIUM CHLORIDE, CALCIUM CHLORIDE 600; 310; 30; 20 MG/100ML; MG/100ML; MG/100ML; MG/100ML
75 INJECTION, SOLUTION INTRAVENOUS CONTINUOUS
Status: DISCONTINUED | OUTPATIENT
Start: 2020-09-30 | End: 2020-10-04

## 2020-09-30 RX ORDER — METRONIDAZOLE 500 MG/100ML
500 INJECTION, SOLUTION INTRAVENOUS EVERY 12 HOURS
Status: DISCONTINUED | OUTPATIENT
Start: 2020-09-30 | End: 2020-10-05 | Stop reason: HOSPADM

## 2020-09-30 RX ADMIN — HYDROCODONE BITARTRATE AND ACETAMINOPHEN 1 TABLET: 5; 325 TABLET ORAL at 13:54

## 2020-09-30 RX ADMIN — PROCHLORPERAZINE EDISYLATE 5 MG: 5 INJECTION INTRAMUSCULAR; INTRAVENOUS at 15:47

## 2020-09-30 RX ADMIN — ONDANSETRON 4 MG: 2 INJECTION INTRAMUSCULAR; INTRAVENOUS at 19:34

## 2020-09-30 RX ADMIN — ONDANSETRON 4 MG: 2 INJECTION INTRAMUSCULAR; INTRAVENOUS at 06:04

## 2020-09-30 RX ADMIN — FAMOTIDINE 20 MG: 10 INJECTION INTRAVENOUS at 21:23

## 2020-09-30 RX ADMIN — FAMOTIDINE 20 MG: 10 INJECTION INTRAVENOUS at 15:46

## 2020-09-30 RX ADMIN — MORPHINE SULFATE 4 MG: 4 INJECTION INTRAVENOUS at 02:00

## 2020-09-30 RX ADMIN — HYDROMORPHONE HYDROCHLORIDE 1 MG: 1 INJECTION, SOLUTION INTRAMUSCULAR; INTRAVENOUS; SUBCUTANEOUS at 11:33

## 2020-09-30 RX ADMIN — ONDANSETRON 4 MG: 2 INJECTION INTRAMUSCULAR; INTRAVENOUS at 11:33

## 2020-09-30 RX ADMIN — IOPAMIDOL 100 ML: 755 INJECTION, SOLUTION INTRAVENOUS at 06:33

## 2020-09-30 RX ADMIN — OXYCODONE HYDROCHLORIDE AND ACETAMINOPHEN 2 TABLET: 5; 325 TABLET ORAL at 09:23

## 2020-09-30 RX ADMIN — LEVOFLOXACIN 500 MG: 5 INJECTION, SOLUTION INTRAVENOUS at 12:38

## 2020-09-30 RX ADMIN — ONDANSETRON 4 MG: 2 INJECTION INTRAMUSCULAR; INTRAVENOUS at 01:58

## 2020-09-30 RX ADMIN — LIDOCAINE HYDROCHLORIDE 40 ML: 20 SOLUTION ORAL; TOPICAL at 04:32

## 2020-09-30 RX ADMIN — METRONIDAZOLE 500 MG: 500 INJECTION, SOLUTION INTRAVENOUS at 21:23

## 2020-09-30 RX ADMIN — HYDROMORPHONE HYDROCHLORIDE 1 MG: 1 INJECTION, SOLUTION INTRAMUSCULAR; INTRAVENOUS; SUBCUTANEOUS at 06:06

## 2020-09-30 RX ADMIN — HYDROMORPHONE HYDROCHLORIDE 1 MG: 1 INJECTION, SOLUTION INTRAMUSCULAR; INTRAVENOUS; SUBCUTANEOUS at 02:51

## 2020-09-30 RX ADMIN — HYDROMORPHONE HYDROCHLORIDE 1 MG: 1 INJECTION, SOLUTION INTRAMUSCULAR; INTRAVENOUS; SUBCUTANEOUS at 15:49

## 2020-09-30 RX ADMIN — SODIUM CHLORIDE, SODIUM LACTATE, POTASSIUM CHLORIDE, AND CALCIUM CHLORIDE 100 ML/HR: 600; 310; 30; 20 INJECTION, SOLUTION INTRAVENOUS at 22:46

## 2020-09-30 RX ADMIN — ROSUVASTATIN CALCIUM 10 MG: 10 TABLET, COATED ORAL at 21:23

## 2020-09-30 RX ADMIN — METRONIDAZOLE 500 MG: 500 INJECTION, SOLUTION INTRAVENOUS at 13:54

## 2020-09-30 RX ADMIN — SODIUM CHLORIDE, SODIUM LACTATE, POTASSIUM CHLORIDE, AND CALCIUM CHLORIDE 100 ML/HR: 600; 310; 30; 20 INJECTION, SOLUTION INTRAVENOUS at 12:37

## 2020-09-30 RX ADMIN — Medication 10 ML: at 13:55

## 2020-09-30 RX ADMIN — FAMOTIDINE 20 MG: 20 TABLET, FILM COATED ORAL at 04:31

## 2020-09-30 RX ADMIN — HYDROMORPHONE HYDROCHLORIDE 1 MG: 1 INJECTION, SOLUTION INTRAMUSCULAR; INTRAVENOUS; SUBCUTANEOUS at 19:34

## 2020-09-30 RX ADMIN — SODIUM CHLORIDE 1000 ML: 900 INJECTION, SOLUTION INTRAVENOUS at 06:54

## 2020-09-30 NOTE — ED NOTES
Bedside and Verbal shift change report given to [address] [place] [address] St (oncoming nurse) by Loretta Vo RN (offgoing nurse). Report included the following information SBAR, MAR, Recent Results and Cardiac Rhythm NSR.

## 2020-09-30 NOTE — PROGRESS NOTES
Problem: Falls - Risk of  Goal: *Absence of Falls  Description: Document Cassie Claire Fall Risk and appropriate interventions in the flowsheet.   Outcome: Progressing Towards Goal  Note: Fall Risk Interventions:  Medication Interventions: Bed/chair exit alarm, Patient to call before getting OOB, Teach patient to arise slowly    Problem: Patient Education: Go to Patient Education Activity  Goal: Patient/Family Education  Outcome: Progressing Towards Goal      Goal: Psychosocial  Outcome: Progressing Towards Goal  Goal: *Hemodynamically stable  Outcome: Progressing Towards Goal  Goal: *Optimal pain control at patient's stated goal  Outcome: Progressing Towards Goal  Goal: *Absence of infection signs and symptoms  Outcome: Progressing Towards Goal     Problem: Pain  Goal: *Control of Pain  Outcome: Progressing Towards Goal     Problem: Patient Education: Go to Patient Education Activity  Goal: Patient/Family Education  Outcome: Progressing Towards Goal

## 2020-09-30 NOTE — PROGRESS NOTES
Admission Medication Reconciliation:     Information obtained from:    Son via interview in ER 12  RxQuery data available¹:  YES    Comments/Recommendations:   Patient able to confirm name, , allergies, and preferred pharmacy  Updated PTA medication list       ¹RxQuery pharmacy benefit data reflects medications filled and processed through the patient's insurance, however   this data does NOT capture whether the medication was picked up or is currently being taken by the patient. Prior to Admission Medications   Prescriptions Last Dose Informant Taking? apixaban (ELIQUIS) 5 mg tablet 2020 at am Self Yes   Sig: Take 1 Tab by mouth two (2) times a day. Indications: CVA, ?PAF, recommended by cardiology and neurology   ascorbic acid, vitamin C, (VITAMIN C) 500 mg tablet 2020 at Unknown time Self Yes   Sig: Take 500 mg by mouth daily. co-enzyme Q-10 (CO Q-10) 100 mg capsule 2020 at Unknown time Self Yes   Sig: Take 100 mg by mouth daily. losartan-hydroCHLOROthiazide (HYZAAR) 50-12.5 mg per tablet 2020 at Unknown time Self Yes   Sig: Take 1 Tab by mouth daily. rosuvastatin (CRESTOR) 10 mg tablet 2020 at Unknown time Self Yes   Sig: Take 10 mg by mouth nightly. Facility-Administered Medications: None         Please contact the main inpatient pharmacy with any questions or concerns at [phone] and we will direct you to the clinical pharmacist covering this patient's care while in-house.    Deshawn Davis, LaurelD, BCPS

## 2020-09-30 NOTE — PROGRESS NOTES
9/30/2020  10:09 AM  Case management note    Reason for Admission:   Acute cholecystitis     Patient came to ED for acute cholecystitis, he continues to have pain. Patient notified staff he is on blood thinner, now surgery will have to wait till Friday. Patient is independent with ADL's, he drives  OBS educated, letter signed and placed on chart  RX3 for meds  Son at bedside [address]                    RUR Score:        low             Plan for utilizing home health: patient is independent and will most likely not qualify for home health services         PCP: First and Last name:  Dr. George Mauricio   Name of Practice:    Are you a current patient: Yes/No: yes   Approximate date of last visit:    Can you participate in a virtual visit with your PCP: yes                    Current Advanced Directive/Advance Care Plan: not on file                         Transition of Care Plan:              1. Home with family assistance  2. PCP follow up  3. Surgery follow up  4. AD follow up  5.  CM to follow for discharge needs    Care Management Interventions  PCP Verified by CM: Yes(dr. Fredy Pratt)  Mode of Transport at Discharge: Self  Current Support Network: Lives with Spouse  The Plan for Transition of Care is Related to the Following Treatment Goals : acute cholecystitis  Discharge Location  Discharge Placement: Home with family assistance  [facility]

## 2020-09-30 NOTE — CONSULTS
Cristian Howard DO  [facility] [address] [place] [address], [address] [place] [address], [address] [place] [address]                                       Office [phone],JFQ [phone]  Office [phone],NWZ [phone]      Date of  Admission: 9/30/2020  1:06 AM  PCP- Enedina Benitez MD    Joey Louis is a 82 y.o. male admitted for Acute calculous cholecystitis [K80.00]. Consult requested by Enedina Benitez MD    Assessment/Plan      Biliary colic    Long-term anticoagulationpatient is on Eliquis therapy. 2018 he presented with acute embolic stroke along with splenic infarct. 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 He was started on Eliquis therapy. Previously implantable loop recorder was discussed with patient, which he deferred.    -Safe to discontinue Eliquis therapy for abdominal surgery. Last reported dose of Eliquis was the a.m. of 9/29/2020. Tomorrow morning will be 48 hours since last dose of Eliquis. If deemed urgent can proceed with surgery tomorrow. However, waiting to Friday, October 2 may improve operative bleeding risk.    -Restart Eliquis therapy when safe from a surgical standpoint in a postoperative period no urgent need required.    -Low risk for adverse cardiovascular event with moderate risk surgery. I appreciate the opportunity to be involved in Mr. Jaqui Glasgow. See below note for details. Please do not hesitate to contact us with questions or concerns. Jasmyne Schaefer DO      Subjective:  Joey Louis is a 82 y.o. male presented to the ED with abdominal pain. Cardiology has been consulted for anticoagulation management in the preoperative period. He has a history of embolic stroke and splenic infarct in 2018. At that time he had normal sinus rhythm without evidence of atrial fibrillation. It was presumed he had cardioembolic etiology. Patient deferred implantable loop monitor for evaluation of atrial fibrillation.   Instead he elected to go on long-term anticoagulation. No history of adverse bleeding. His last dose of Eliquis was the a.m. of 9/29/2020. He is active without any chest pain chest pressure symptoms. Past Medical History:   Diagnosis Date    Benign hypertension     Chest pain, unspecified     Dyspnea     Hypertension     LBBB (left bundle branch block)     Palpitations     Splenic infarct     splenic infarcts 12/18    Splenic vein thrombosis     12/18    Stroke ([address]     Brain MRI 7/3/19 - Small left pontine and left midbrain acute infarctions.  Vertigo       Past Surgical History:   Procedure Laterality Date    HX TONSIL AND ADENOIDECTOMY       Allergies   Allergen Reactions    Pcn [Penicillins] Unknown (comments)     Family History   Problem Relation Age of Onset    Cancer Mother     Stroke Mother     Cancer Father       Social History     Tobacco Use    Smoking status: Never Smoker    Smokeless tobacco: Never Used   Substance Use Topics    Alcohol use: No    Drug use: Never          Medications:  Medications Prior to Admission   Medication Sig    losartan-hydroCHLOROthiazide (HYZAAR) 50-12.5 mg per tablet Take 1 Tab by mouth daily.  rosuvastatin (CRESTOR) 10 mg tablet Take 10 mg by mouth nightly.  co-enzyme Q-10 (CO Q-10) 100 mg capsule Take 100 mg by mouth daily.  apixaban (ELIQUIS) 5 mg tablet Take 1 Tab by mouth two (2) times a day. Indications: CVA, ?PAF, recommended by cardiology and neurology    ascorbic acid, vitamin C, (VITAMIN C) 500 mg tablet Take 500 mg by mouth daily.      Current Facility-Administered Medications   Medication Dose Route Frequency    sodium chloride (NS) flush 5-40 mL  5-40 mL IntraVENous Q8H    sodium chloride (NS) flush 5-40 mL  5-40 mL IntraVENous PRN    ondansetron (ZOFRAN) injection 4 mg  4 mg IntraVENous Q4H PRN    naloxone (NARCAN) injection 0.4 mg  0.4 mg IntraVENous PRN    acetaminophen (TYLENOL) tablet 650 mg  650 mg Oral Q6H PRN    HYDROcodone-acetaminophen (NORCO) 5-325 mg per tablet 1 Tab  1 Tab Oral Q4H PRN    HYDROmorphone (DILAUDID) syringe 1 mg  1 mg IntraVENous Q3H PRN    lactated Ringers infusion  100 mL/hr IntraVENous CONTINUOUS    levoFLOXacin (LEVAQUIN) 500 mg in D5W IVPB  500 mg IntraVENous Q24H    metroNIDAZOLE (FLAGYL) IVPB premix 500 mg  500 mg IntraVENous Q12H    rosuvastatin (CRESTOR) tablet 10 mg  10 mg Oral QHS    [START ON 10/1/2020] losartan (COZAAR) tablet 50 mg  50 mg Oral DAILY    hydrALAZINE (APRESOLINE) 20 mg/mL injection 10 mg  10 mg IntraVENous Q6H PRN    famotidine (PF) (PEPCID) 20 mg in 0.9% sodium chloride 10 mL injection  20 mg IntraVENous Q12H    prochlorperazine (COMPAZINE) injection 5 mg  5 mg IntraVENous Q6H PRN         Review of Systems:  Pertinent Positive: Abdominal pain  Pertinent Negative:No chest pain, dyspnea on exertion, shortness of breath, orthopnea, PND    All Other systems reviewed and are negative for a Comprehensive ROS (10+)        Physical Exam:  Visit Vitals  BP (!) 176/78   Pulse (!) 57   Temp 97.6 °F (36.4 °C)   Resp 12   Ht 6' (1.829 m)   Wt 203 lb (92.1 kg)   SpO2 92%   BMI 27.53 kg/m²           Gen: Well-developed, well-nourished, in no acute distress  HEENT:  Pink conjunctivae, hearing intact to voice, moist mucous membranes  Neck: Supple,No JVD, No Carotid Bruit  Resp: No accessory muscle use, Clear breath sounds, No rales or rhonchi  Card: Normal Rate,Regular Rythm,Normal S1, S2, No murmurs, rubs or gallop. No thrills.    Abd:  Soft, non-tender, non-distended, normoactive bowel sounds are present   MSK: No cyanosis or clubbing  Skin: No rashes or ulcers  Neuro:  Cranial nerves are grossly intact, moving all four extremities, no focal deficit, follows commands appropriately  Psych:  Good insight, oriented to person, place and time, alert, Nml Affect  LE: No edema  Vascular:Distal Pulses 2+ and symmetric          EKG: Normal sinus rhythm, first-degree AV block, left bundle branch block. Cardiac Testing/ Procedures:    07/03/19   ECHO RICKY W OR WO CONTRAST 07/05/2019 7/5/2019    Narrative · Left Ventricle: Normal cavity size, wall thickness and systolic function   (ejection fraction normal). Estimated left ventricular ejection fraction   is 56 - 60%. · Mitral Valve: Mild to moderate mitral valve regurgitation.         Signed by: Rere Welsh DO       LABS:    Lab Results   Component Value Date/Time    WBC 8.5 09/30/2020 01:15 AM    HGB 17.9 (H) 09/30/2020 01:15 AM    HCT 53.7 (H) 09/30/2020 01:15 AM    PLATELET 533 01/53/1824 01:15 AM    MCV 97.5 09/30/2020 01:15 AM     Lab Results   Component Value Date/Time    Sodium 138 09/30/2020 06:16 AM    Potassium 4.0 09/30/2020 06:16 AM    Chloride 104 09/30/2020 06:16 AM    CO2 27 09/30/2020 06:16 AM    Anion gap 7 09/30/2020 06:16 AM    Glucose 141 (H) 09/30/2020 06:16 AM    BUN 25 (H) 09/30/2020 06:16 AM    Creatinine 1.31 (H) 09/30/2020 06:16 AM    BUN/Creatinine ratio 19 09/30/2020 06:16 AM    GFR est AA >60 09/30/2020 06:16 AM    GFR est non-AA 52 (L) 09/30/2020 06:16 AM    Calcium 8.8 09/30/2020 06:16 AM     Lab Results   Component Value Date/Time    CK 93 12/12/2018 11:25 AM    CK-MB Index 1.7 12/12/2018 11:25 AM    Troponin-I, Qt. <0.05 09/30/2020 01:15 AM     Lab Results   Component Value Date/Time    aPTT 24.2 07/03/2019 07:49 PM     Lab Results   Component Value Date/Time    INR 1.1 07/03/2019 07:49 PM    INR 1.1 12/26/2018 03:29 PM    INR 1.1 12/11/2018 10:58 PM    Prothrombin time 11.4 (H) 07/03/2019 07:49 PM    Prothrombin time 10.9 12/26/2018 03:29 PM    Prothrombin time 10.8 12/11/2018 10:58 PM           Anibal Merrill DO

## 2020-09-30 NOTE — PROGRESS NOTES
Automatic dose adjustment per [facility] P&T protocol for Metronidazole (Flagyl) for this 82 y.o. for indication of: intra-abdominal.    Current regimen: Metronidazole 500mg IV every 8 hrs  Recommended regimen: Metronidazole 500mg IV every 12      Intervention:  1. Pharmacy will automatically change the dose of metronidazole to 500 mg every 12 hours for all indications except for the following:   a. C. difficile infection   b. CNS infections   c. Non-anaerobic infections (giardiasis, trichomonas, H pylori, etc)       Thank you,  RUSTY Cooney

## 2020-09-30 NOTE — ED NOTES
Verbal shift change report given to [facility] (oncoming nurse) by Caryn Schaffer (offgoing nurse).  Report included the following information, SBAR, ED Summary, MAR.

## 2020-09-30 NOTE — ED PROVIDER NOTES
The history is provided by the patient and the spouse. Abdominal Pain    This is a new problem. The current episode started 6 to 12 hours ago. The problem occurs constantly. The problem has not changed since onset. The pain is associated with vomiting and eating. The pain is located in the epigastric region. The quality of the pain is sharp (\"there's a fireball in my stomach. \"). The pain is moderate. Associated symptoms include nausea and vomiting (x1, NB/NB). Pertinent negatives include no fever, no dysuria, no headaches and no chest pain. The pain is worsened by eating. The pain is relieved by nothing. hernia repair. 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     Vertigo        Past Surgical History:   Procedure Laterality Date    HX TONSIL AND ADENOIDECTOMY           Family History:   Problem Relation Age of Onset    Cancer Mother     Stroke Mother     Cancer Father        Social History     Socioeconomic History    Marital status:      Spouse name: Not on file    Number of children: Not on file    Years of education: Not on file    Highest education level: Not on file   Occupational History    Not on file   Social Needs    Financial resource strain: Not on file    Food insecurity     Worry: Not on file     Inability: Not on file    Transportation needs     Medical: Not on file     Non-medical: Not on file   Tobacco Use    Smoking status: Never Smoker    Smokeless tobacco: Never Used   Substance and Sexual Activity    Alcohol use: No    Drug use: Never    Sexual activity: Not on file   Lifestyle    Physical activity     Days per week: Not on file     Minutes per session: Not on file    Stress: Not on file   Relationships    Social connections     Talks on phone: Not on file     Gets together: Not on file     Attends Mandaen service: Not on file     Active member of club or organization: Not on file     Attends meetings of clubs or organizations: Not on file     Relationship status: Not on file    Intimate partner violence     Fear of current or ex partner: Not on file     Emotionally abused: Not on file     Physically abused: Not on file     Forced sexual activity: Not on file   Other Topics Concern    Not on file   Social History Narrative    Not on file         ALLERGIES: Pcn [penicillins]    Review of Systems   Constitutional: Negative for chills and fever. HENT: Negative for sore throat. Respiratory: Negative for cough and shortness of breath. Cardiovascular: Negative for chest pain. Gastrointestinal: Positive for abdominal pain, nausea and vomiting (x1, NB/NB). Genitourinary: Negative for dysuria. Skin: Negative for rash. Neurological: Negative for syncope and headaches. All other systems reviewed and are negative. Vitals:    09/30/20 0036 09/30/20 0115   BP: (!) 201/95 (!) 170/81   Pulse: 97    Resp: 20    Temp: 97.4 °F (36.3 °C)    SpO2: 96%    Weight: 92.1 kg (203 lb)    Height: 6' (1.829 m)             Physical Exam  Vitals signs and nursing note reviewed. Constitutional:       General: He is not in acute distress. Appearance: He is well-developed. HENT:      Head: Normocephalic and atraumatic. Eyes:      Conjunctiva/sclera: Conjunctivae normal.   Neck:      Musculoskeletal: Normal range of motion and neck supple. Cardiovascular:      Rate and Rhythm: Normal rate and regular rhythm. Pulmonary:      Effort: Pulmonary effort is normal. No respiratory distress. Abdominal:      Palpations: Abdomen is soft. Tenderness: There is abdominal tenderness in the right upper quadrant and epigastric area. There is no guarding or rebound. Negative signs include McBurney's sign. Musculoskeletal: Normal range of motion. Skin:     General: Skin is warm and dry. Neurological:      Mental Status: He is alert and oriented to person, place, and time. Motor: No abnormal muscle tone. MDM       Procedures    EKG interpretation: 1:24  Rhythm: sinus bradycardia with 1st AV block and a LBBB and occasional PACs; and regular . Rate (approx.): 63; Intervals: QTc 493 ms; ST/T wave: no STEMI; EKG documented and interpreted by Cheri Nieto MD, ED MD.      5:49   I consulted Dr. Mayi Olivier, general surgeon on call. He agrees to accept the patient to his service for admission. N.p.o. status requested. 6:04  I spoke with Dr. Laevrne Ramirez. Would like to repeat the CT with contrast to rule out acute splenic infarction prior to consideration of cholecystectomy.

## 2020-09-30 NOTE — ED TRIAGE NOTES
Triage: Pt advises he was eating, after he ate, he felt a \"fireball\" in his abd. Pt said he had the same thing last week, but it went away.

## 2020-09-30 NOTE — CONSULTS
Surgery Consult    Subjective:      Gera Killian is a 82 y. o.white male who presents with epigastric pain and gallstones. Mr. Rachele Betts was doing fine until last night when he developed an acute onset of epigastric pain. The pain has been persistent and has been associated with 1 episode of n/v in the ER. He denies any fever, jaundice, or diarrhea. He has a h/o splenic infarcts and splenic vein thrombosis, but no pancreatitis, PUD, liver disease, or other intestinal disease. He denies any previous abdominal procedures. He has had gallstones documented on multiple imaging studies over the past 10 years. His US and CT today reveal multiple gallstones with possible pericholecystic fluid, otherwise negative. Of note, he is on Eliquis for a CVA last year. 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     Vertigo      Past Surgical History:   Procedure Laterality Date    HX TONSIL AND ADENOIDECTOMY        Family History   Problem Relation Age of Onset    Cancer Mother     Stroke Mother     Cancer Father      Social History     Socioeconomic History    Marital status:      Spouse name: Not on file    Number of children: Not on file    Years of education: Not on file    Highest education level: Not on file   Tobacco Use    Smoking status: Never Smoker    Smokeless tobacco: Never Used   Substance and Sexual Activity    Alcohol use: No    Drug use: Never      Current Facility-Administered Medications   Medication Dose Route Frequency Provider Last Rate Last Dose    sodium chloride (NS) flush 5-40 mL  5-40 mL IntraVENous Q8H Lisha Valencia MD        sodium chloride (NS) flush 5-40 mL 5-40 mL IntraVENous PRN Melissa Sharp MD        [facility] injection 4 mg  4 mg IntraVENous Q4H PRN Melissa Sharp MD        naloxone [facility]) injection 0.4 mg  0.4 mg IntraVENous PRN Melissa Sharp MD        acetaminophen (TYLENOL) tablet 650 mg  650 mg Oral Q6H PRN Melissa Sharp MD        HYDROcodone-acetaminophen [facility]) 5-325 mg per tablet 1 Tab  1 Tab Oral Q4H PRN Melissa Sharp MD        HYDROmorphone (DILAUDID) syringe 1 mg  1 mg IntraVENous Q3H PRN Melissa Sharp MD        lactated Ringers infusion  100 mL/hr IntraVENous CONTINUOUS Melissa Sharp MD        levoFLOXacin [facility]) 500 mg in D5W IVPB  500 mg IntraVENous Q24H Melissa Sharp MD        metroNIDAZOLE (FLAGYL) IVPB premix 500 mg  500 mg IntraVENous Q12H Melissa Sharp MD         Current Outpatient Medications   Medication Sig Dispense Refill    valsartan-hydroCHLOROthiazide (DIOVAN-HCT) 320-25 mg per tablet Take 0.5 Tabs by mouth daily. 30 Tab 0    rosuvastatin (CRESTOR) 10 mg tablet Take 10 mg by mouth nightly.  co-enzyme Q-10 (CO Q-10) 100 mg capsule Take 100 mg by mouth daily.  apixaban (ELIQUIS) 5 mg tablet Take 1 Tab by mouth two (2) times a day. Indications: CVA, ?PAF, recommended by cardiology and neurology 60 Tab 0    ascorbic acid, vitamin C, (VITAMIN C) 500 mg tablet Take 500 mg by mouth daily.  acetaminophen (TYLENOL) 325 mg tablet Take 2 Tabs by mouth every six (6) hours as needed. Allergies   Allergen Reactions    Pcn [Penicillins] Unknown (comments)       Review of Systems:  A comprehensive review of systems was negative except for that written in the History of Present Illness.     Objective:        Patient Vitals for the past 8 hrs:   BP Pulse SpO2   09/30/20 0900 (!) 163/87  94 %   09/30/20 0715 (!) 147/70  93 %   09/30/20 0615 (!) 175/86  93 %   09/30/20 0600 (!) 175/83  96 %   09/30/20 0545 (!) 184/91  96 % 20 0530 (!) 187/108  96 %   20 0515 (!) 172/85  95 %   20 0500 (!) 160/87  92 %   20 0445 (!) 174/91  96 %   20 0430 (!) 170/94  94 %   20 0415 (!) 168/98  91 %   20 0400 (!) 154/67  96 %   20 0345 (!) 176/80  96 %   20 0330 (!) 181/85  95 %   20 0200  74        Temp (24hrs), Av.4 °F (36.3 °C), Min:97.4 °F (36.3 °C), Max:97.4 °F (36.3 °C)      Physical Exam:  GENERAL: alert, cooperative, no distress, appears stated age, EYE: negative findings: anicteric sclera, LYMPHATIC: Cervical, supraclavicular nodes normal. , THROAT & NECK: normal, LUNG: clear to auscultation bilaterally, HEART: regular rate and rhythm, ABDOMEN: Soft, hypoactive BS, ND, mild epigastric tenderness without guarding., EXTREMITIES:  no edema, SKIN: Normal., NEUROLOGIC: negative, PSYCHIATRIC: non focal    Assessment:     Hospital Problems  Date Reviewed: 2020          Codes Class Noted POA    * (Principal) Acute calculous cholecystitis ICD-10-CM: K80.00  ICD-9-CM: 574.00  2020 Yes              Plan:     I have offered to admit Mr. Ana Paula Mera for bowel rest, pain management, IV antibiotics, and IVF's. Since he is on Eliquis, he would not be able to have his lap sukumar for 48 hours after he is cleared by Medicine or Cardiology to come off. He would like to go home and is waiting to make sure that his pain subsides with oral pain medication. If he leaves, then he can f/u in the office for his procedure next week. His son was present for discussion. I informed the ED physician, Dr. Bradly De La Cruz, regarding the plan.

## 2020-09-30 NOTE — ED NOTES
TRANSFER - OUT REPORT:    Verbal report given to salvatore(name) on Daniel Parish  being transferred to 4th floor(unit) for routine progression of care       Report consisted of patients Situation, Background, Assessment and   Recommendations(SBAR). Information from the following report(s) SBAR, ED Summary and MAR was reviewed with the receiving nurse. Lines:   Peripheral IV 09/30/20 Left Antecubital (Active)   Site Assessment Clean, dry, & intact 09/30/20 0118   Phlebitis Assessment 0 09/30/20 0118   Infiltration Assessment 0 09/30/20 0118   Dressing Status Clean, dry, & intact 09/30/20 0118   Dressing Type Tape;Transparent 09/30/20 0118   Hub Color/Line Status Pink;Flushed 09/30/20 0118   Action Taken Blood drawn 09/30/20 0118        Opportunity for questions and clarification was provided.       Patient transported with:   patient transport

## 2020-10-01 ENCOUNTER — ANESTHESIA EVENT (OUTPATIENT)
Dept: SURGERY | Age: 85
DRG: 417 | End: 2020-10-01
Payer: MEDICARE

## 2020-10-01 LAB
BASOPHILS # BLD: 0.1 K/UL (ref 0–0.1)
BASOPHILS NFR BLD: 0 % (ref 0–1)
DIFFERENTIAL METHOD BLD: ABNORMAL
EOSINOPHIL # BLD: 0 K/UL (ref 0–0.4)
EOSINOPHIL NFR BLD: 0 % (ref 0–7)
ERYTHROCYTE [DISTWIDTH] IN BLOOD BY AUTOMATED COUNT: 13.1 % (ref 11.5–14.5)
HCT VFR BLD AUTO: 50.2 % (ref 36.6–50.3)
HGB BLD-MCNC: 17.1 G/DL (ref 12.1–17)
IMM GRANULOCYTES # BLD AUTO: 0.1 K/UL (ref 0–0.04)
IMM GRANULOCYTES NFR BLD AUTO: 1 % (ref 0–0.5)
LYMPHOCYTES # BLD: 1.6 K/UL (ref 0.8–3.5)
LYMPHOCYTES NFR BLD: 9 % (ref 12–49)
MCH RBC QN AUTO: 32.9 PG (ref 26–34)
MCHC RBC AUTO-ENTMCNC: 34.1 G/DL (ref 30–36.5)
MCV RBC AUTO: 96.5 FL (ref 80–99)
MONOCYTES # BLD: 1.6 K/UL (ref 0–1)
MONOCYTES NFR BLD: 10 % (ref 5–13)
NEUTS SEG # BLD: 13.8 K/UL (ref 1.8–8)
NEUTS SEG NFR BLD: 80 % (ref 32–75)
NRBC # BLD: 0 K/UL (ref 0–0.01)
NRBC BLD-RTO: 0 PER 100 WBC
PLATELET # BLD AUTO: 131 K/UL (ref 150–400)
PMV BLD AUTO: 9.7 FL (ref 8.9–12.9)
RBC # BLD AUTO: 5.2 M/UL (ref 4.1–5.7)
WBC # BLD AUTO: 17.2 K/UL (ref 4.1–11.1)

## 2020-10-01 PROCEDURE — 94760 N-INVAS EAR/PLS OXIMETRY 1: CPT

## 2020-10-01 PROCEDURE — 83735 ASSAY OF MAGNESIUM: CPT

## 2020-10-01 PROCEDURE — 74011250636 HC RX REV CODE- 250/636: Performed by: SURGERY

## 2020-10-01 PROCEDURE — 80048 BASIC METABOLIC PNL TOTAL CA: CPT

## 2020-10-01 PROCEDURE — 99218 HC RM OBSERVATION: CPT

## 2020-10-01 PROCEDURE — 36415 COLL VENOUS BLD VENIPUNCTURE: CPT

## 2020-10-01 PROCEDURE — 65270000029 HC RM PRIVATE

## 2020-10-01 PROCEDURE — 74011250637 HC RX REV CODE- 250/637: Performed by: SURGERY

## 2020-10-01 PROCEDURE — 85025 COMPLETE CBC W/AUTO DIFF WBC: CPT

## 2020-10-01 PROCEDURE — 96376 TX/PRO/DX INJ SAME DRUG ADON: CPT

## 2020-10-01 PROCEDURE — 77010033678 HC OXYGEN DAILY

## 2020-10-01 PROCEDURE — 99231 SBSQ HOSP IP/OBS SF/LOW 25: CPT | Performed by: SURGERY

## 2020-10-01 RX ADMIN — SODIUM CHLORIDE, SODIUM LACTATE, POTASSIUM CHLORIDE, AND CALCIUM CHLORIDE 100 ML/HR: 600; 310; 30; 20 INJECTION, SOLUTION INTRAVENOUS at 08:48

## 2020-10-01 RX ADMIN — LEVOFLOXACIN 500 MG: 5 INJECTION, SOLUTION INTRAVENOUS at 08:16

## 2020-10-01 RX ADMIN — FAMOTIDINE 20 MG: 10 INJECTION INTRAVENOUS at 21:16

## 2020-10-01 RX ADMIN — METRONIDAZOLE 500 MG: 500 INJECTION, SOLUTION INTRAVENOUS at 21:16

## 2020-10-01 RX ADMIN — FAMOTIDINE 20 MG: 10 INJECTION INTRAVENOUS at 08:19

## 2020-10-01 RX ADMIN — ROSUVASTATIN CALCIUM 10 MG: 10 TABLET, COATED ORAL at 21:16

## 2020-10-01 RX ADMIN — METRONIDAZOLE 500 MG: 500 INJECTION, SOLUTION INTRAVENOUS at 09:22

## 2020-10-01 RX ADMIN — Medication 10 ML: at 14:00

## 2020-10-01 RX ADMIN — SODIUM CHLORIDE, SODIUM LACTATE, POTASSIUM CHLORIDE, AND CALCIUM CHLORIDE 75 ML/HR: 600; 310; 30; 20 INJECTION, SOLUTION INTRAVENOUS at 21:16

## 2020-10-01 RX ADMIN — LOSARTAN POTASSIUM 50 MG: 50 TABLET, FILM COATED ORAL at 08:19

## 2020-10-01 NOTE — PROGRESS NOTES
10/1/2020  4:14 PM  RUR:  11%  Risk Level: [x]Low []Moderate []High  Value-based purchasing: [] Yes [] No  Bundle patient: [] Yes [x] No   Specify:     Transition of care plan:  1. Awaiting medical clearance and discharge order. Lap sukumar scheduled for tomorrow. 2. Dispo to be determined depending on medical progression. CM to continue to monitor. Pt's son contacted CM via p/c to express concern re insurance coverage. CM referred pt to Patient Access and Financial Counselor for an itemized billing estimate. 3. Outpatient follow-up. 4. Pt's family to transport.

## 2020-10-01 NOTE — PROGRESS NOTES
Problem: Falls - Risk of  Goal: *Absence of Falls  Description: Document Albino Messing Fall Risk and appropriate interventions in the flowsheet.   Outcome: Progressing Towards Goal  Note: Fall Risk Interventions:            Medication Interventions: Patient to call before getting OOB                   Problem: Patient Education: Go to Patient Education Activity  Goal: Patient/Family Education  Outcome: Progressing Towards Goal     Problem: Patient Education: Go to Patient Education Activity  Goal: Patient/Family Education  Outcome: Progressing Towards Goal     Problem: Lap Jaylene: 2-4 hours Post-Op (Initiate SCIP measures post-procedure)  Goal: Off Pathway (Use only if patient is Off Pathway)  Outcome: Progressing Towards Goal  Goal: Activity/Safety  Outcome: Progressing Towards Goal  Goal: Consults, if ordered  Outcome: Progressing Towards Goal  Goal: Diagnostic Test/Procedures  Outcome: Progressing Towards Goal  Goal: Nutrition/Diet  Outcome: Progressing Towards Goal  Goal: Discharge Planning  Outcome: Progressing Towards Goal  Goal: Medications  Outcome: Progressing Towards Goal  Goal: Respiratory  Outcome: Progressing Towards Goal  Goal: Treatments/Interventions/Procedures  Outcome: Progressing Towards Goal  Goal: Psychosocial  Outcome: Progressing Towards Goal  Goal: *Hemodynamically stable  Outcome: Progressing Towards Goal  Goal: *Optimal pain control at patient's stated goal  Outcome: Progressing Towards Goal  Goal: *Absence of infection signs and symptoms  Outcome: Progressing Towards Goal     Problem: Lap Jaylene: 4-12 hours Post-Op  Goal: Off Pathway (Use only if patient is Off Pathway)  Outcome: Progressing Towards Goal  Goal: Activity/Safety  Outcome: Progressing Towards Goal  Goal: Nutrition/Diet  Outcome: Progressing Towards Goal  Goal: Medications  Outcome: Progressing Towards Goal  Goal: Respiratory  Outcome: Progressing Towards Goal  Goal: Treatments/Interventions/Procedures  Outcome: Progressing Towards Goal  Goal: Psychosocial  Outcome: Progressing Towards Goal  Goal: *Demonstrates progressive activity  Outcome: Progressing Towards Goal  Goal: *Hemodynamically stable  Outcome: Progressing Towards Goal  Goal: *Optimal pain control at patient's stated goal  Outcome: Progressing Towards Goal  Goal: *Absence of infection signs and symptoms  Outcome: Progressing Towards Goal     Problem: Lap Jaylene: 12 Hours through Discharge  Goal: Off Pathway (Use only if patient is Off Pathway)  Outcome: Progressing Towards Goal  Goal: Activity/Safety  Outcome: Progressing Towards Goal  Goal: Nutrition/Diet  Outcome: Progressing Towards Goal  Goal: Medications  Outcome: Progressing Towards Goal  Goal: Respiratory  Outcome: Progressing Towards Goal  Goal: Treatments/Interventions/Procedures  Outcome: Progressing Towards Goal  Goal: Psychosocial  Outcome: Progressing Towards Goal     Problem: Lap Jaylene: Discharge Outcomes  Goal: *Demonstrates progressive activity  Outcome: Progressing Towards Goal  Goal: *Tolerating diet  Outcome: Progressing Towards Goal  Goal: *Hemodynamically stable  Outcome: Progressing Towards Goal  Goal: *Describes available resources and support systems  Outcome: Progressing Towards Goal  Goal: *Optimal pain control at patient's stated goal  Outcome: Progressing Towards Goal  Goal: *Verbalizes understanding and describes medication purposes and frequencies  Outcome: Progressing Towards Goal  Goal: *Lungs clear or at baseline  Outcome: Progressing Towards Goal  Goal: *Verbalizes and demonstrates incision care  Outcome: Progressing Towards Goal     Problem: Pain  Goal: *Control of Pain  Outcome: Progressing Towards Goal     Problem: Patient Education: Go to Patient Education Activity  Goal: Patient/Family Education  Outcome: Progressing Towards Goal

## 2020-10-01 NOTE — PROGRESS NOTES
Progress Note    Patient: Jeannette Randhawa MRN: [id number]  SSN: [id number]    YOB: 1935  Age: 82 y.o. Sex: male      Admit Date: 2020    * No surgery date entered *    Procedure:  Procedure(s):  None. Subjective:     Mr. Sidney Winkler feels better. His pain is resolving along with his nausea. He denies any further vomiting. Objective:     Visit Vitals  BP (!) 161/73   Pulse 70   Temp 98.3 °F (36.8 °C)   Resp 15   Ht 6' (1.829 m)   Wt 203 lb (92.1 kg)   SpO2 94%   BMI 27.53 kg/m²       Temp (24hrs), Av.1 °F (36.7 °C), Min:97.6 °F (36.4 °C), Max:98.7 °F (37.1 °C)      Physical Exam:    GENERAL: alert, cooperative, no distress, EYE: negative findings: anicteric sclera, ABDOMEN: Soft, ND, minimal epigastric tenderness. Lab Review:   BMP: No results found for: NA, K, CL, CO2, AGAP, GLU, BUN, CREA, GFRAA, GFRNA  CBC: No results found for: WBC, HGB, HGBEXT, HCT, HCTEXT, PLT, PLTEXT, HGBEXT, HCTEXT, PLTEXT    Assessment:     Hospital Problems  Date Reviewed: 2020          Codes Class Noted POA    * (Principal) Acute calculous cholecystitis ICD-10-CM: K80.00  ICD-9-CM: 574.00  2020 Yes              Plan/Recommendations/Medical Decision Making:     Clear liquids. Decrease IVF's. Continue IV antibiotics. Continue pain management. Ambulate in slater. Appreciate Dr. Kit Bailey assistance. Plan for lap sukumar tomorrow. The risks of the procedure were discussed with Mr. Sidney Winkler and his wife including: bleeding, infection, wound healing problems, blood clots, injury to the bowel, liver, or bile duct, persistent pain, and reaction to the prep, contrast, or local and general anesthesia.

## 2020-10-01 NOTE — PROGRESS NOTES
Bedside and Verbal shift change report given to [address] [place] [id number] [place], RN (oncoming nurse) by Joey Lima RN (offgoing nurse). Report included the following information SBAR, Kardex, Intake/Output and MAR.

## 2020-10-02 ENCOUNTER — APPOINTMENT (OUTPATIENT)
Dept: GENERAL RADIOLOGY | Age: 85
DRG: 417 | End: 2020-10-02
Attending: SURGERY
Payer: MEDICARE

## 2020-10-02 ENCOUNTER — ANESTHESIA (OUTPATIENT)
Dept: SURGERY | Age: 85
DRG: 417 | End: 2020-10-02
Payer: MEDICARE

## 2020-10-02 LAB
ALBUMIN SERPL-MCNC: 2.7 G/DL (ref 3.5–5)
ALBUMIN SERPL-MCNC: 2.9 G/DL (ref 3.5–5)
ALBUMIN/GLOB SERPL: 0.8 {RATIO} (ref 1.1–2.2)
ALBUMIN/GLOB SERPL: 0.9 {RATIO} (ref 1.1–2.2)
ALP SERPL-CCNC: 75 U/L (ref 45–117)
ALP SERPL-CCNC: 77 U/L (ref 45–117)
ALT SERPL-CCNC: 15 U/L (ref 12–78)
ALT SERPL-CCNC: 17 U/L (ref 12–78)
ANION GAP SERPL CALC-SCNC: 6 MMOL/L (ref 5–15)
AST SERPL-CCNC: 18 U/L (ref 15–37)
AST SERPL-CCNC: 18 U/L (ref 15–37)
ATRIAL RATE: 62 BPM
BASOPHILS # BLD: 0.1 K/UL (ref 0–0.1)
BASOPHILS NFR BLD: 0 % (ref 0–1)
BILIRUB DIRECT SERPL-MCNC: 0.3 MG/DL (ref 0–0.2)
BILIRUB SERPL-MCNC: 1.9 MG/DL (ref 0.2–1)
BILIRUB SERPL-MCNC: 1.9 MG/DL (ref 0.2–1)
BUN SERPL-MCNC: 19 MG/DL (ref 6–20)
BUN/CREAT SERPL: 15 (ref 12–20)
CALCIUM SERPL-MCNC: 8.1 MG/DL (ref 8.5–10.1)
CALCULATED R AXIS, ECG10: -18 DEGREES
CALCULATED T AXIS, ECG11: 132 DEGREES
CHLORIDE SERPL-SCNC: 103 MMOL/L (ref 97–108)
CO2 SERPL-SCNC: 25 MMOL/L (ref 21–32)
CREAT SERPL-MCNC: 1.28 MG/DL (ref 0.7–1.3)
DIAGNOSIS, 93000: NORMAL
DIFFERENTIAL METHOD BLD: ABNORMAL
EOSINOPHIL # BLD: 0 K/UL (ref 0–0.4)
EOSINOPHIL NFR BLD: 0 % (ref 0–7)
ERYTHROCYTE [DISTWIDTH] IN BLOOD BY AUTOMATED COUNT: 12.9 % (ref 11.5–14.5)
GLOBULIN SER CALC-MCNC: 3.2 G/DL (ref 2–4)
GLOBULIN SER CALC-MCNC: 3.4 G/DL (ref 2–4)
GLUCOSE BLD STRIP.AUTO-MCNC: 101 MG/DL (ref 65–100)
GLUCOSE SERPL-MCNC: 96 MG/DL (ref 65–100)
HCT VFR BLD AUTO: 46.7 % (ref 36.6–50.3)
HGB BLD-MCNC: 15.9 G/DL (ref 12.1–17)
IMM GRANULOCYTES # BLD AUTO: 0.1 K/UL (ref 0–0.04)
IMM GRANULOCYTES NFR BLD AUTO: 1 % (ref 0–0.5)
LACTATE SERPL-SCNC: 1.3 MMOL/L (ref 0.4–2)
LACTATE SERPL-SCNC: 1.9 MMOL/L (ref 0.4–2)
LIPASE SERPL-CCNC: 50 U/L (ref 73–393)
LYMPHOCYTES # BLD: 1.1 K/UL (ref 0.8–3.5)
LYMPHOCYTES NFR BLD: 7 % (ref 12–49)
MAGNESIUM SERPL-MCNC: 1.8 MG/DL (ref 1.6–2.4)
MCH RBC QN AUTO: 33.1 PG (ref 26–34)
MCHC RBC AUTO-ENTMCNC: 34 G/DL (ref 30–36.5)
MCV RBC AUTO: 97.3 FL (ref 80–99)
MONOCYTES # BLD: 1.4 K/UL (ref 0–1)
MONOCYTES NFR BLD: 9 % (ref 5–13)
NEUTS SEG # BLD: 13.1 K/UL (ref 1.8–8)
NEUTS SEG NFR BLD: 83 % (ref 32–75)
NRBC # BLD: 0 K/UL (ref 0–0.01)
NRBC BLD-RTO: 0 PER 100 WBC
PLATELET # BLD AUTO: 127 K/UL (ref 150–400)
PMV BLD AUTO: 9.8 FL (ref 8.9–12.9)
POTASSIUM SERPL-SCNC: 3.5 MMOL/L (ref 3.5–5.1)
PROT SERPL-MCNC: 6.1 G/DL (ref 6.4–8.2)
PROT SERPL-MCNC: 6.1 G/DL (ref 6.4–8.2)
Q-T INTERVAL, ECG07: 376 MS
QRS DURATION, ECG06: 128 MS
QTC CALCULATION (BEZET), ECG08: 513 MS
RBC # BLD AUTO: 4.8 M/UL (ref 4.1–5.7)
SERVICE CMNT-IMP: ABNORMAL
SODIUM SERPL-SCNC: 134 MMOL/L (ref 136–145)
VENTRICULAR RATE, ECG03: 112 BPM
WBC # BLD AUTO: 15.8 K/UL (ref 4.1–11.1)

## 2020-10-02 PROCEDURE — 77030008771 HC TU NG SALEM SUMP -A: Performed by: NURSE ANESTHETIST, CERTIFIED REGISTERED

## 2020-10-02 PROCEDURE — 76210000016 HC OR PH I REC 1 TO 1.5 HR: Performed by: SURGERY

## 2020-10-02 PROCEDURE — 77030016151 HC PROTCTR LNS DFOG COVD -B: Performed by: SURGERY

## 2020-10-02 PROCEDURE — 77030010939 HC CLP LIG TELE -B: Performed by: SURGERY

## 2020-10-02 PROCEDURE — 65660000000 HC RM CCU STEPDOWN

## 2020-10-02 PROCEDURE — 74011000250 HC RX REV CODE- 250: Performed by: SURGERY

## 2020-10-02 PROCEDURE — 77030020263 HC SOL INJ SOD CL0.9% LFCR 1000ML: Performed by: SURGERY

## 2020-10-02 PROCEDURE — 77030008608 HC TRCR ENDOSC SMTH AMR -B: Performed by: SURGERY

## 2020-10-02 PROCEDURE — 77030013079 HC BLNKT BAIR HGGR 3M -A: Performed by: NURSE ANESTHETIST, CERTIFIED REGISTERED

## 2020-10-02 PROCEDURE — 74011000250 HC RX REV CODE- 250: Performed by: NURSE ANESTHETIST, CERTIFIED REGISTERED

## 2020-10-02 PROCEDURE — 2709999900 HC NON-CHARGEABLE SUPPLY: Performed by: SURGERY

## 2020-10-02 PROCEDURE — 77030002933 HC SUT MCRYL J&J -A: Performed by: SURGERY

## 2020-10-02 PROCEDURE — 93005 ELECTROCARDIOGRAM TRACING: CPT

## 2020-10-02 PROCEDURE — 77030002916 HC SUT ETHLN J&J -A: Performed by: SURGERY

## 2020-10-02 PROCEDURE — 74011250636 HC RX REV CODE- 250/636: Performed by: ANESTHESIOLOGY

## 2020-10-02 PROCEDURE — 77010033678 HC OXYGEN DAILY

## 2020-10-02 PROCEDURE — 77030035277 HC OBTRTR BLDELSS DISP INTU -B: Performed by: SURGERY

## 2020-10-02 PROCEDURE — 74011000258 HC RX REV CODE- 258: Performed by: NURSE ANESTHETIST, CERTIFIED REGISTERED

## 2020-10-02 PROCEDURE — 83690 ASSAY OF LIPASE: CPT

## 2020-10-02 PROCEDURE — 87040 BLOOD CULTURE FOR BACTERIA: CPT

## 2020-10-02 PROCEDURE — 77030031139 HC SUT VCRL2 J&J -A: Performed by: SURGERY

## 2020-10-02 PROCEDURE — 76010000879 HC OR TIME 3.5 TO 4HR INTENSV - TIER 2: Performed by: SURGERY

## 2020-10-02 PROCEDURE — 77030033188 HC TBNG FLTRD BIIFUR DISP CNMD -C: Performed by: SURGERY

## 2020-10-02 PROCEDURE — 0FT44ZZ RESECTION OF GALLBLADDER, PERCUTANEOUS ENDOSCOPIC APPROACH: ICD-10-PCS | Performed by: SURGERY

## 2020-10-02 PROCEDURE — 88304 TISSUE EXAM BY PATHOLOGIST: CPT

## 2020-10-02 PROCEDURE — 76060000038 HC ANESTHESIA 3.5 TO 4 HR: Performed by: SURGERY

## 2020-10-02 PROCEDURE — 77030039429 HC SUT PASSR CROSSBOW DISP ADLR -B: Performed by: SURGERY

## 2020-10-02 PROCEDURE — 77030040361 HC SLV COMPR DVT MDII -B

## 2020-10-02 PROCEDURE — 74011250636 HC RX REV CODE- 250/636: Performed by: NURSE ANESTHETIST, CERTIFIED REGISTERED

## 2020-10-02 PROCEDURE — 47562 LAPAROSCOPIC CHOLECYSTECTOMY: CPT | Performed by: SURGERY

## 2020-10-02 PROCEDURE — 77030019908 HC STETH ESOPH SIMS -A: Performed by: NURSE ANESTHETIST, CERTIFIED REGISTERED

## 2020-10-02 PROCEDURE — 99231 SBSQ HOSP IP/OBS SF/LOW 25: CPT | Performed by: SURGERY

## 2020-10-02 PROCEDURE — 8E0W4CZ ROBOTIC ASSISTED PROCEDURE OF TRUNK REGION, PERCUTANEOUS ENDOSCOPIC APPROACH: ICD-10-PCS | Performed by: SURGERY

## 2020-10-02 PROCEDURE — 77030018836 HC SOL IRR NACL ICUM -A: Performed by: SURGERY

## 2020-10-02 PROCEDURE — 74011250636 HC RX REV CODE- 250/636: Performed by: SURGERY

## 2020-10-02 PROCEDURE — 77030008684 HC TU ET CUF COVD -B: Performed by: NURSE ANESTHETIST, CERTIFIED REGISTERED

## 2020-10-02 PROCEDURE — 77030040504 HC DRN WND MDII -B: Performed by: SURGERY

## 2020-10-02 PROCEDURE — 77030040926 HC LAP BG TISS RETVR CNMD -B: Performed by: SURGERY

## 2020-10-02 PROCEDURE — 83735 ASSAY OF MAGNESIUM: CPT

## 2020-10-02 PROCEDURE — 77030040922 HC BLNKT HYPOTHRM STRY -A

## 2020-10-02 PROCEDURE — 94760 N-INVAS EAR/PLS OXIMETRY 1: CPT

## 2020-10-02 PROCEDURE — 36415 COLL VENOUS BLD VENIPUNCTURE: CPT

## 2020-10-02 PROCEDURE — 77030020703 HC SEAL CANN DISP INTU -B: Performed by: SURGERY

## 2020-10-02 PROCEDURE — 77030026438 HC STYL ET INTUB CARD -A: Performed by: NURSE ANESTHETIST, CERTIFIED REGISTERED

## 2020-10-02 PROCEDURE — 80076 HEPATIC FUNCTION PANEL: CPT

## 2020-10-02 PROCEDURE — 74011250637 HC RX REV CODE- 250/637: Performed by: SURGERY

## 2020-10-02 PROCEDURE — 85025 COMPLETE CBC W/AUTO DIFF WBC: CPT

## 2020-10-02 PROCEDURE — 77030009851 HC PCH RTVR ENDOSC AMR -B: Performed by: SURGERY

## 2020-10-02 PROCEDURE — 82962 GLUCOSE BLOOD TEST: CPT

## 2020-10-02 PROCEDURE — 71045 X-RAY EXAM CHEST 1 VIEW: CPT

## 2020-10-02 PROCEDURE — 80053 COMPREHEN METABOLIC PANEL: CPT

## 2020-10-02 PROCEDURE — 77030012770 HC TRCR OPT FX AMR -B: Performed by: SURGERY

## 2020-10-02 PROCEDURE — 77030018673: Performed by: SURGERY

## 2020-10-02 PROCEDURE — 77030040506 HC DRN WND MDII -A: Performed by: SURGERY

## 2020-10-02 PROCEDURE — 83605 ASSAY OF LACTIC ACID: CPT

## 2020-10-02 RX ORDER — HYDROMORPHONE HYDROCHLORIDE 1 MG/ML
.25-1 INJECTION, SOLUTION INTRAMUSCULAR; INTRAVENOUS; SUBCUTANEOUS
Status: DISCONTINUED | OUTPATIENT
Start: 2020-10-02 | End: 2020-10-02 | Stop reason: HOSPADM

## 2020-10-02 RX ORDER — MIDAZOLAM HYDROCHLORIDE 1 MG/ML
INJECTION, SOLUTION INTRAMUSCULAR; INTRAVENOUS AS NEEDED
Status: DISCONTINUED | OUTPATIENT
Start: 2020-10-02 | End: 2020-10-02 | Stop reason: HOSPADM

## 2020-10-02 RX ORDER — LIDOCAINE HYDROCHLORIDE 20 MG/ML
INJECTION, SOLUTION EPIDURAL; INFILTRATION; INTRACAUDAL; PERINEURAL AS NEEDED
Status: DISCONTINUED | OUTPATIENT
Start: 2020-10-02 | End: 2020-10-02 | Stop reason: HOSPADM

## 2020-10-02 RX ORDER — PROPOFOL 10 MG/ML
INJECTION, EMULSION INTRAVENOUS AS NEEDED
Status: DISCONTINUED | OUTPATIENT
Start: 2020-10-02 | End: 2020-10-02 | Stop reason: HOSPADM

## 2020-10-02 RX ORDER — ROCURONIUM BROMIDE 10 MG/ML
INJECTION, SOLUTION INTRAVENOUS AS NEEDED
Status: DISCONTINUED | OUTPATIENT
Start: 2020-10-02 | End: 2020-10-02 | Stop reason: HOSPADM

## 2020-10-02 RX ORDER — GLYCOPYRROLATE 0.2 MG/ML
INJECTION INTRAMUSCULAR; INTRAVENOUS AS NEEDED
Status: DISCONTINUED | OUTPATIENT
Start: 2020-10-02 | End: 2020-10-02 | Stop reason: HOSPADM

## 2020-10-02 RX ORDER — SODIUM CHLORIDE 0.9 % (FLUSH) 0.9 %
5-10 SYRINGE (ML) INJECTION AS NEEDED
Status: DISCONTINUED | OUTPATIENT
Start: 2020-10-02 | End: 2020-10-05 | Stop reason: HOSPADM

## 2020-10-02 RX ORDER — FENTANYL CITRATE 50 UG/ML
INJECTION, SOLUTION INTRAMUSCULAR; INTRAVENOUS AS NEEDED
Status: DISCONTINUED | OUTPATIENT
Start: 2020-10-02 | End: 2020-10-02 | Stop reason: HOSPADM

## 2020-10-02 RX ORDER — NEOSTIGMINE METHYLSULFATE 1 MG/ML
INJECTION, SOLUTION INTRAVENOUS AS NEEDED
Status: DISCONTINUED | OUTPATIENT
Start: 2020-10-02 | End: 2020-10-02 | Stop reason: HOSPADM

## 2020-10-02 RX ORDER — SODIUM CHLORIDE, SODIUM LACTATE, POTASSIUM CHLORIDE, CALCIUM CHLORIDE 600; 310; 30; 20 MG/100ML; MG/100ML; MG/100ML; MG/100ML
100 INJECTION, SOLUTION INTRAVENOUS CONTINUOUS
Status: DISCONTINUED | OUTPATIENT
Start: 2020-10-02 | End: 2020-10-02 | Stop reason: HOSPADM

## 2020-10-02 RX ORDER — BUPIVACAINE HYDROCHLORIDE 5 MG/ML
INJECTION, SOLUTION EPIDURAL; INTRACAUDAL AS NEEDED
Status: DISCONTINUED | OUTPATIENT
Start: 2020-10-02 | End: 2020-10-02 | Stop reason: HOSPADM

## 2020-10-02 RX ORDER — INDOCYANINE GREEN AND WATER 25 MG
2.5 KIT INJECTION ONCE
Status: DISCONTINUED | OUTPATIENT
Start: 2020-10-02 | End: 2020-10-02 | Stop reason: HOSPADM

## 2020-10-02 RX ORDER — PHENYLEPHRINE HCL IN 0.9% NACL 0.4MG/10ML
SYRINGE (ML) INTRAVENOUS AS NEEDED
Status: DISCONTINUED | OUTPATIENT
Start: 2020-10-02 | End: 2020-10-02 | Stop reason: HOSPADM

## 2020-10-02 RX ORDER — ONDANSETRON 2 MG/ML
INJECTION INTRAMUSCULAR; INTRAVENOUS AS NEEDED
Status: DISCONTINUED | OUTPATIENT
Start: 2020-10-02 | End: 2020-10-02 | Stop reason: HOSPADM

## 2020-10-02 RX ORDER — DEXAMETHASONE SODIUM PHOSPHATE 4 MG/ML
INJECTION, SOLUTION INTRA-ARTICULAR; INTRALESIONAL; INTRAMUSCULAR; INTRAVENOUS; SOFT TISSUE AS NEEDED
Status: DISCONTINUED | OUTPATIENT
Start: 2020-10-02 | End: 2020-10-02 | Stop reason: HOSPADM

## 2020-10-02 RX ORDER — LIDOCAINE HYDROCHLORIDE 10 MG/ML
0.1 INJECTION, SOLUTION EPIDURAL; INFILTRATION; INTRACAUDAL; PERINEURAL AS NEEDED
Status: DISCONTINUED | OUTPATIENT
Start: 2020-10-02 | End: 2020-10-02 | Stop reason: HOSPADM

## 2020-10-02 RX ORDER — SUCCINYLCHOLINE CHLORIDE 20 MG/ML
INJECTION INTRAMUSCULAR; INTRAVENOUS AS NEEDED
Status: DISCONTINUED | OUTPATIENT
Start: 2020-10-02 | End: 2020-10-02 | Stop reason: HOSPADM

## 2020-10-02 RX ADMIN — FENTANYL CITRATE 100 MCG: 50 INJECTION, SOLUTION INTRAMUSCULAR; INTRAVENOUS at 13:33

## 2020-10-02 RX ADMIN — SODIUM CHLORIDE 1000 ML: 900 INJECTION, SOLUTION INTRAVENOUS at 05:58

## 2020-10-02 RX ADMIN — LEVOFLOXACIN 500 MG: 5 INJECTION, SOLUTION INTRAVENOUS at 08:53

## 2020-10-02 RX ADMIN — SODIUM CHLORIDE, SODIUM LACTATE, POTASSIUM CHLORIDE, AND CALCIUM CHLORIDE 100 ML/HR: 600; 310; 30; 20 INJECTION, SOLUTION INTRAVENOUS at 13:06

## 2020-10-02 RX ADMIN — Medication 10 ML: at 05:59

## 2020-10-02 RX ADMIN — GLYCOPYRROLATE 0.4 MG: 0.2 INJECTION INTRAMUSCULAR; INTRAVENOUS at 16:46

## 2020-10-02 RX ADMIN — FENTANYL CITRATE 50 MCG: 50 INJECTION, SOLUTION INTRAMUSCULAR; INTRAVENOUS at 16:46

## 2020-10-02 RX ADMIN — Medication 300 MCG: at 13:53

## 2020-10-02 RX ADMIN — PROPOFOL 50 MG: 10 INJECTION, EMULSION INTRAVENOUS at 13:40

## 2020-10-02 RX ADMIN — PHENYLEPHRINE HYDROCHLORIDE 10 MCG/MIN: 10 INJECTION INTRAVENOUS at 14:03

## 2020-10-02 RX ADMIN — Medication 10 ML: at 21:32

## 2020-10-02 RX ADMIN — DEXAMETHASONE SODIUM PHOSPHATE 4 MG: 4 INJECTION, SOLUTION INTRAMUSCULAR; INTRAVENOUS at 13:50

## 2020-10-02 RX ADMIN — ACETAMINOPHEN 650 MG: 325 TABLET ORAL at 02:27

## 2020-10-02 RX ADMIN — SUCCINYLCHOLINE CHLORIDE 100 MG: 20 INJECTION, SOLUTION INTRAMUSCULAR; INTRAVENOUS; PARENTERAL at 13:33

## 2020-10-02 RX ADMIN — PROPOFOL 130 MG: 10 INJECTION, EMULSION INTRAVENOUS at 13:33

## 2020-10-02 RX ADMIN — Medication 3 MG: at 16:46

## 2020-10-02 RX ADMIN — ROCURONIUM BROMIDE 20 MG: 10 INJECTION INTRAVENOUS at 15:13

## 2020-10-02 RX ADMIN — ROCURONIUM BROMIDE 5 MG: 10 INJECTION INTRAVENOUS at 13:33

## 2020-10-02 RX ADMIN — SODIUM CHLORIDE, SODIUM LACTATE, POTASSIUM CHLORIDE, AND CALCIUM CHLORIDE: 600; 310; 30; 20 INJECTION, SOLUTION INTRAVENOUS at 15:32

## 2020-10-02 RX ADMIN — SODIUM CHLORIDE 1000 ML: 900 INJECTION, SOLUTION INTRAVENOUS at 04:30

## 2020-10-02 RX ADMIN — Medication 200 MCG: at 13:36

## 2020-10-02 RX ADMIN — FAMOTIDINE 20 MG: 10 INJECTION INTRAVENOUS at 08:52

## 2020-10-02 RX ADMIN — METRONIDAZOLE 500 MG: 500 INJECTION, SOLUTION INTRAVENOUS at 21:33

## 2020-10-02 RX ADMIN — FAMOTIDINE 20 MG: 10 INJECTION INTRAVENOUS at 21:33

## 2020-10-02 RX ADMIN — LIDOCAINE HYDROCHLORIDE 60 MG: 20 INJECTION, SOLUTION EPIDURAL; INFILTRATION; INTRACAUDAL; PERINEURAL at 13:33

## 2020-10-02 RX ADMIN — SODIUM CHLORIDE 763 ML: 900 INJECTION, SOLUTION INTRAVENOUS at 07:30

## 2020-10-02 RX ADMIN — Medication 200 MCG: at 13:45

## 2020-10-02 RX ADMIN — ROCURONIUM BROMIDE 20 MG: 10 INJECTION INTRAVENOUS at 13:53

## 2020-10-02 RX ADMIN — MIDAZOLAM HYDROCHLORIDE 1 MG: 2 INJECTION, SOLUTION INTRAMUSCULAR; INTRAVENOUS at 13:28

## 2020-10-02 RX ADMIN — ONDANSETRON HYDROCHLORIDE 4 MG: 2 SOLUTION INTRAMUSCULAR; INTRAVENOUS at 16:14

## 2020-10-02 RX ADMIN — FENTANYL CITRATE 50 MCG: 50 INJECTION, SOLUTION INTRAMUSCULAR; INTRAVENOUS at 15:34

## 2020-10-02 RX ADMIN — METRONIDAZOLE 500 MG: 500 INJECTION, SOLUTION INTRAVENOUS at 08:53

## 2020-10-02 RX ADMIN — FENTANYL CITRATE 50 MCG: 50 INJECTION, SOLUTION INTRAMUSCULAR; INTRAVENOUS at 13:28

## 2020-10-02 NOTE — PERIOP NOTES
Message sent to patient's family: \"Dr. Craig Amato is still working, the patient is doing well. The procedure is taking longer than expected. \"

## 2020-10-02 NOTE — PROGRESS NOTES
Patient c/o trembling and chills at approximately 0210, temp was 99.2, blood sugar was 101. Patient reassessed at [phone],  and temperature had gone up to 101.2, oral Tylenol 650mg was administered. At 0359 temp was 99.8, but BP had dropped to 92/56 and heart rate was 117. Code sepsis was called, and sepsis bundle ordered. EKG showed A fib with RVR. General surgeon on call was paged, on call cardiologist paged also. New orders received for hospitalist consult, hepatic function panel and lipase and  transfer to stepdown. Patient transferred to ICU.

## 2020-10-02 NOTE — CONSULTS
[address]     Name:  Gopi Butler  MR#:  [id number]  :  1935  ACCOUNT #:  [de-identified]    DATE OF SERVICE:  10/02/2020    PRIMARY CARE PHYSICIAN:  Chase Jamison MD    CHIEF COMPLAINT:  Consult called by surgeon, Dr. Chaparrita Xiao, for medical management with code sepsis. 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 Code sepsis was called for chills, fever and hypotension. Patient denied any associated headaches, dizziness, visual deficits, chest pain, palpitations, sore throat, cough, nausea, vomiting, uncontrolled abdominal pain, bladder or bowel irregularities. PAST MEDICAL HISTORY:  1.  Primary hypertension. 2.  Left bundle-branch block. 3.  Benign prostatic hypertrophy. 4.  Embolic CVA with splenic infarct. 5.  Cholecystitis. PAST SURGICAL HISTORY:  1. Tonsillectomy. 2.  Adenoidectomy. CURRENT HOSPITAL MEDICATIONS:  1. Pepcid 20 mg IV every 12 hours. 2.  Levaquin 500 mg IV every 24 hours. 3.  Cozaar 50 mg p.o. daily. 4.  Flagyl 500 mg IV every 12 hours. 5.  Crestor 10 mg p.o. at bedtime. 6.  Lactated Ringer's IV fluids at 100 mL/hour. 7.  Tylenol 650 mg p.o. every 6 hours as needed for fever. 8.  Hydralazine 10 mg IV every 6 hours as needed for elevated blood pressure. 9.  Norco 5/325 one tablet p.o. every 4 hours as needed for pain. 10.  Dilaudid 1 mg IV every 3 hours as needed for severe pain. 11.  Zofran 4 mg IV every 4 hours as needed for nausea and vomiting. 12.  Compazine 5 mg IV every 6 hours as needed for nausea and vomiting. 13.  Naloxone 0.4 mg IV as needed for opiate reversal.    ALLERGIES:  PENICILLIN. SOCIAL HISTORY:  Is a never smoker. Denied alcohol use disorder. Denied any recent travels or known sick contacts. FAMILY HISTORY:  Reviewed and positive for malignancy on both the maternal and paternal sides of the family, CVA on the maternal side of the family. REVIEW OF SYSTEMS:  A complete system review conducted essentially negative, otherwise as outlined in the history of the presenting illness. PHYSICAL EXAMINATION:  GENERAL: No acute distress. VITAL SIGNS:  Blood pressure 92/56, heart rate 121 per minute, respiratory rate 15, maximum temperature 101.7 degrees Fahrenheit, saturating 90-95% on room air. HEENT:  Head exam, normocephalic. Pupils equal and reactive to light without any nystagmus. ENT exam, ear, nose, throat clear. NECK:  No jugular venous distention or carotid bruits. CARDIOVASCULAR:  S1, S2 tachycardic. RESPIRATORY:  Lungs with decreased air entry at both bases without any crepitations or rhonchi. GI:  Bowel sounds present. The abdomen is soft. Tenderness elicited on both superficial and deep palpation, more so in the right upper and right lower quadrants. No rebound, no guarding. GENITOURINARY:  No suprapubic or flank tenderness. MUSCULOSKELETAL:  No asymmetry of the extremities. CNS:  Awake and alert, oriented to time, date, place, person. LABORATORY DATA/RADIOGRAPHIC FINDINGS:      CT of the abdomen and pelvis done on 09/30/2020 with small areas of splenic scarring related to previous infarction. Small but patent splenic vein. Distended gallbladder containing several large gallstones, one of which in the fundus of the gallbladder, with mild pericholecystic fluid. Enlarged prostate with heterogeneous enhancement. Metabolic panel with sodium 134, potassium 3.5, chloride 103, bicarb 25, BUN 19, creatinine 1.28, glucose 96, calcium 8.1, albumin 2.9, magnesium 1.8. Total bilirubin 1.9. ALT of 15, AST of 18, alkaline phosphatase 75.     CBC with a WBC of 17.2, 80% polys, hemoglobin 17.1, hematocrit 50.2, platelet count 671, MCV 96.5.    Urinalysis with clear urine, positive nitrites, small leukocyte esterase, wbc 0-4, negative bacteria. ASSESSMENT/PLAN:  1. Infectious Disease: Septic shock secondary to acute cholecystitis. Maximum temperature 101.7 degrees Fahrenheit, leukocytosis, left shift, blood cultures, lactic acid level,  IVFs, continuation of IV Levaquin and IV metronidazole. HISTORY OF PENICILLIN ALLERGY. As-needed Infectious Disease consult. 2.  Gastroenterology:  Acute symptomatic calculous cholecystitis. N.p.o. status, IV fluids, IV analgesics, IV antiemetics. Plans for laparoscopic cholecystectomy per General Surgery. 3.  Electrolytes:  Mild probable multifactorial hyponatremia. For followup sodium level. 4.  Hematology:  Mild thrombocytopenia without any bleeding diathesis. For trending of platelet count. 5.  Cardiovascular:  History of primary hypertension. 6.  Central nervous system:  History of embolic cerebrovascular accident with long-term oral anticoagulation. 7.  Genitourinary:  History of benign prostatic hypertrophy. 8.  Prophylaxis for deep vein thrombosis with sequential compression devices in the preoperative period. 8.  Directives: Full code with an extremely guarded prognosis. At increased risk of decompensation. Discussed with  patient. In summary, an 63-year-old gentleman with a past medical history significant for primary hypertension, embolic CVA with splenic infarct  on long-term oral anticoagulation, benign prostatic hypertrophy and left bundle-branch block is being seen in consultation for septic shock secondary to acute calculous cholecystitis, necessitating ongoing close monitoring and management including with sepsis protocol of IV fluids, lactic acid level, blood cultures, broad coverage IV antibiotics, as-needed pressors to maintain a mean arterial pressure of at least 65, with plans for gallbladder surgery once medically stable. The entire plan discussed with  patient.   All questions and concerns were addressed. Patient's functional status prior to admission significant for patient being able to ambulate unassisted. Thank you for allowing the hospitalist team to partake in the care of this patient. We will follow along with you. Total time for consult 40 minutes, of which at least 50% of the time was spent in plan of care and counseling of  patient.       Michelle Fishman MD      [id number]  D:  10/02/2020 4:58  T:  10/02/2020 5:46  JOB #:  [id number]

## 2020-10-02 NOTE — PROGRESS NOTES
9040: Code Sepsis called for room 406 at 0336 Patient HR elevated in the 110s, temperature of 101 and BP dropped from 's to 90's. Spoke with Dr. Bella Baltazar regarding EKG changes. Order to transfer to stepdown to watch more carefully and if HR sustains >120 to give 250 of digoxin. 9820: TRANSFER - IN REPORT:  Verbal report received from [employer] RN(name) on On license of [facility]  being received from 4th floor (unit) for change in patient condition(new onset Afib )    Report consisted of patients Situation, Background, Assessment and   Recommendations(SBAR). Information from the following report(s) SBAR, Kardex, Procedure Summary, Intake/Output, MAR, Recent Results, Med Rec Status and Cardiac Rhythm Afib RVR  was reviewed with the receiving nurse. Opportunity for questions and clarification was provided. Assessment completed upon patients arrival to unit and care assumed. 0530: Gave Wife patients access code and Updated son via phone. 0645: Dr. Patty Coffman updated on patients status. Plan for surgery today. 0700: Bedside and Verbal shift change report given to Brigid Weathers RN (oncoming nurse) by Stephanie Subramanian RN (offgoing nurse).  Report included the following information SBAR, Kardex, Procedure Summary, Intake/Output, MAR, Recent Results, Med Rec Status and Cardiac Rhythm Afib   Primary Nurse Sheree Saucedo and Brigid Weathers, RN performed a dual skin assessment on this patient No impairment noted  Rosales score is 19

## 2020-10-02 NOTE — PROGRESS NOTES
Bedside and Verbal shift change report given to Natalia Gutierrez (oncoming nurse) by Regine Mccallum RN (offgoing nurse). Report included the following information SBAR, Kardex, ED Summary, Procedure Summary, Intake/Output, Recent Results, Med Rec Status and Cardiac Rhythm NSR with LBBB, PVC's.     2000: Pt resting quietly, lethargic, denies pain. Pt wife at bedside. Abd Lap coly sites clean, dry, intact; CHELA drain intact; draining. 0543: Pt resting well throughout the night; c/o abd pain 4/10 only when coughing. Encouraged pt to splint abd with pillow when coughing. Administered Norco 5-325 tab per pt request.    Bedside and Verbal shift change report given to Regine Mccallum RN (oncoming nurse) by Natalia Gutierrez (offgoing nurse). Report included the following information SBAR, Kardex, ED Summary, Procedure Summary, Intake/Output, Recent Results, Med Rec Status and Cardiac Rhythm NSR/SA with PAC's, LBBB.

## 2020-10-02 NOTE — PROGRESS NOTES
3307  TRANSFER - IN REPORT:    Verbal report received from [facility] (name) on Jeannette Kebede  being received from ICU (unit) for routine progression of care      Report consisted of patients Situation, Background, Assessment and   Recommendations(SBAR). Information from the following report(s) SBAR, Kardex, Procedure Summary, Intake/Output, MAR, Accordion, Recent Results and Cardiac Rhythm Afib was reviewed with the receiving nurse. Opportunity for questions and clarification was provided. Assessment completed upon patients arrival to unit and care assumed. Initial assessment done. No complaints of pain. HR in the 80's, afib. Procedure scheduled at 1:30 pm today. Still need to see GI.     1000  Explained to the son why we are only allowing 1 visitor for the whole stay. 1118  Gave report to Sandra from pre-op. CHG done. Will  patient at 12 for the procedure. 1200  TRANSFER - OUT REPORT:    Verbal report given to [employer] (name) on Jeannette Randhawa  being transferred to Pre Op(unit) for ordered procedure       Report consisted of patients Situation, Background, Assessment and   Recommendations(SBAR). Information from the following report(s) SBAR, Kardex, Procedure Summary, Intake/Output, MAR, Accordion and Recent Results was reviewed with the receiving nurse.     Lines:   Peripheral IV 09/30/20 Left Antecubital (Active)   Site Assessment Clean, dry, & intact 10/02/20 1217   Phlebitis Assessment 0 10/02/20 1217   Infiltration Assessment 0 10/02/20 1217   Dressing Status Clean, dry, & intact 10/02/20 1217   Dressing Type Transparent 10/02/20 1217   Hub Color/Line Status Pink 10/02/20 1217   Action Taken Open ports on tubing capped 10/02/20 0952   Alcohol Cap Used Yes 10/02/20 1217       Peripheral IV 10/02/20 Right Antecubital (Active)   Site Assessment Clean, dry, & intact 10/02/20 1217   Phlebitis Assessment 0 10/02/20 1217   Infiltration Assessment 0 10/02/20 1217   Dressing Status Clean, dry, & intact 10/02/20 1217   Dressing Type Tape;Transparent 10/02/20 1217   Hub Color/Line Status Pink;Flushed;Capped; Patent 10/02/20 0679   Action Taken Open ports on tubing capped 10/02/20 0952   Alcohol Cap Used Yes 10/02/20 1217       Peripheral IV 10/02/20 Forearm (Active)   Site Assessment Clean, dry, & intact 10/02/20 1217   Phlebitis Assessment 0 10/02/20 1217   Infiltration Assessment 0 10/02/20 1217   Dressing Status Clean, dry, & intact 10/02/20 1217   Dressing Type Tape;Transparent 10/02/20 1217   Hub Color/Line Status Pink; Infusing 10/02/20 1217   Action Taken Open ports on tubing capped 10/02/20 0952   Alcohol Cap Used Yes 10/02/20 1217        Opportunity for questions and clarification was provided. Patient transported with:   Registered Nurse    [address] - IN REPORT:    Verbal report received from Dorcas Estrada (name) on [facility]  being received from PACU (unit) for routine post - op      Report consisted of patients Situation, Background, Assessment and   Recommendations(SBAR). Information from the following report(s) SBAR, Kardex, Procedure Summary, Intake/Output, MAR, Accordion and Recent Results was reviewed with the receiving nurse. Opportunity for questions and clarification was provided. Assessment completed upon patients arrival to unit and care assumed. Patient arrived via bed. No complaints of pain. Alert and oriented x4. Wife will stay overnight. 1930  Bedside and Verbal shift change report given to Melia Sanchez (oncoming nurse) by Demarco Nava RN (offgoing nurse). Report included the following information SBAR, Kardex, Procedure Summary, Intake/Output, MAR, Accordion and Recent Results.

## 2020-10-02 NOTE — ANESTHESIA POSTPROCEDURE EVALUATION
Procedure(s):  ROBOTIC ASSISTED CHOLECYSTECTOMY WITH FIREFLY. general    Anesthesia Post Evaluation      Multimodal analgesia: multimodal analgesia not used between 6 hours prior to anesthesia start to PACU discharge  Patient location during evaluation: PACU  Patient participation: complete - patient participated  Level of consciousness: awake  Pain management: adequate  Airway patency: patent  Anesthetic complications: no  Cardiovascular status: acceptable, blood pressure returned to baseline and hemodynamically stable  Respiratory status: acceptable  Hydration status: acceptable  Post anesthesia nausea and vomiting:  controlled      INITIAL Post-op Vital signs:   Vitals Value Taken Time   /62 10/2/2020  5:25 PM   Temp 36.9 °C (98.5 °F) 10/2/2020  5:12 PM   Pulse 83 10/2/2020  5:33 PM   Resp 19 10/2/2020  5:33 PM   SpO2 94 % 10/2/2020  5:33 PM   Vitals shown include unvalidated device data.

## 2020-10-02 NOTE — PROGRESS NOTES
Bedside and Verbal shift change report given to Karley Farmer RN (oncoming nurse) by Андрей Feldman RN (offgoing nurse). Report included the following information SBAR, Kardex, MAR and Cardiac Rhythm Afib.     0700: Patient resting in quietly in bed. In Afib. Heart rate in the 80s. No c/o pain. Stated pain is only when abdomen is touched. Second bolus started. Wife at bedside. Encouraged patient to call for assistance. 0900: TRANSFER - OUT REPORT:    Verbal report given to Dayne Magaña RN (name) on Kevin Manriquez  being transferred to [facility] (unit) for routine progression of care       Report consisted of patients Situation, Background, Assessment and   Recommendations(SBAR). Information from the following report(s) SBAR, Kardex, [facility] and Cardiac Rhythm Afib was reviewed with the receiving nurse.     Lines:   Peripheral IV 09/30/20 Left Antecubital (Active)   Site Assessment Clean, dry, & intact 10/02/20 0500   Phlebitis Assessment 0 10/02/20 0500   Infiltration Assessment 0 10/02/20 0500   Dressing Status Clean, dry, & intact 10/02/20 0500   Dressing Type Transparent 10/02/20 0500   Hub Color/Line Status Pink;Capped 10/02/20 0500   Action Taken Open ports on tubing capped 10/02/20 0500   Alcohol Cap Used Yes 10/02/20 0500       Peripheral IV 10/02/20 Right Antecubital (Active)   Site Assessment Clean, dry, & intact 10/02/20 0500   Phlebitis Assessment 0 10/02/20 0500   Infiltration Assessment 0 10/02/20 0500   Dressing Status Clean, dry, & intact 10/02/20 0500   Dressing Type Transparent 10/02/20 0500   Hub Color/Line Status Pink;Capped 10/02/20 0500   Action Taken Open ports on tubing capped 10/02/20 0500   Alcohol Cap Used Yes 10/02/20 0500       Peripheral IV 10/02/20 Forearm (Active)   Site Assessment Clean, dry, & intact 10/02/20 0500   Phlebitis Assessment 0 10/02/20 0500   Infiltration Assessment 0 10/02/20 0500   Dressing Status Clean, dry, & intact 10/02/20 0500   Dressing Type Transparent 10/02/20 0500 Hub Color/Line Status Pink; Infusing 10/02/20 0500   Action Taken Open ports on tubing capped 10/02/20 0500   Alcohol Cap Used Yes 10/02/20 0500        Opportunity for questions and clarification was provided.       Patient transported with:   Monitor  O2 @ 2 liters  Registered Nurse

## 2020-10-02 NOTE — PROGRESS NOTES
Progress Note    Patient: Davi Rodriguez MRN: [id number]  SSN: [id number]    YOB: 1935  Age: 82 y.o. Sex: male      Admit Date: 2020    Day of Surgery    Procedure:  Procedure(s):  ROBOTIC ASSISTED CHOLECYSTECTOMY WITH FIREFLY (URGENT)    Subjective:     Events of this AM noted. Mr. Eulalia Sellers feels better now. He still has pain. Objective:     Visit Vitals  BP (!) 93/57   Pulse 82   Temp 98.5 °F (36.9 °C)   Resp 22   Ht 6' (1.829 m)   Wt 203 lb (92.1 kg)   SpO2 97%   BMI 27.53 kg/m²       Temp (24hrs), Av.4 °F (37.4 °C), Min:98.3 °F (36.8 °C), Max:101.7 °F (38.7 °C)      Physical Exam:    GENERAL: alert, cooperative, no distress, EYE: negative findings: anicteric sclera, ABDOMEN: Soft, mild distention, mild epigastric tenderness without guarding.     Lab Review:   BMP:   Lab Results   Component Value Date/Time     (L) 10/02/2020 03:45 AM    K 3.5 10/02/2020 03:45 AM     10/02/2020 03:45 AM    CO2 25 10/02/2020 03:45 AM    AGAP 6 10/02/2020 03:45 AM    GLU 96 10/02/2020 03:45 AM    BUN 19 10/02/2020 03:45 AM    CREA 1.28 10/02/2020 03:45 AM    GFRAA >60 10/02/2020 03:45 AM    GFRNA 54 (L) 10/02/2020 03:45 AM     CMP:   Lab Results   Component Value Date/Time     (L) 10/02/2020 03:45 AM    K 3.5 10/02/2020 03:45 AM     10/02/2020 03:45 AM    CO2 25 10/02/2020 03:45 AM    AGAP 6 10/02/2020 03:45 AM    GLU 96 10/02/2020 03:45 AM    BUN 19 10/02/2020 03:45 AM    CREA 1.28 10/02/2020 03:45 AM    GFRAA >60 10/02/2020 03:45 AM    GFRNA 54 (L) 10/02/2020 03:45 AM    CA 8.1 (L) 10/02/2020 03:45 AM    MG 1.8 10/02/2020 03:45 AM    ALB 2.9 (L) 10/02/2020 03:45 AM    TP 6.1 (L) 10/02/2020 03:45 AM    GLOB 3.2 10/02/2020 03:45 AM    AGRAT 0.9 (L) 10/02/2020 03:45 AM    ALT 15 10/02/2020 03:45 AM     CBC:   Lab Results   Component Value Date/Time    WBC 15.8 (H) 10/02/2020 07:14 AM    HGB 15.9 10/02/2020 07:14 AM    HCT 46.7 10/02/2020 07:14 AM     (L) 10/02/2020 07:14 AM All Cardiac Markers in the last 24 hours: No results found for: CPK, CK, CKMMB, CKMB, RCK3, CKMBT, CKNDX, CKND1, JOSH, TROPT, TROIQ, ESTHER, TROPT, TNIPOC, BNP, BNPP  Liver Panel:   Lab Results   Component Value Date/Time    ALB 2.9 (L) 10/02/2020 03:45 AM    CBIL 0.3 (H) 10/02/2020 03:40 AM    TP 6.1 (L) 10/02/2020 03:45 AM    GLOB 3.2 10/02/2020 03:45 AM    AGRAT 0.9 (L) 10/02/2020 03:45 AM    ALT 15 10/02/2020 03:45 AM    AP 75 10/02/2020 03:45 AM     Pancreatic Markers:   Lab Results   Component Value Date/Time    LPSE 50 (L) 10/02/2020 03:40 AM       Assessment:     Hospital Problems  Date Reviewed: 8/6/2020          Codes Class Noted POA    * (Principal) Acute calculous cholecystitis ICD-10-CM: K80.00  ICD-9-CM: 574.00  9/30/2020 Yes              Plan/Recommendations/Medical Decision Making:     Fever resolved, WBC down, T. Bili up with primary indirect hyperbilirubinemia. Probably had a CBD stone temporarily obstructing. Will plan for IOC with lap sukumar today. Will talk to GI and see if they would prefer to do ERCP simultaneously or wait for results of IOC. Continue IV antibiotics and IVF's. Plan of care discussed with Mr. Hilton Khalil and his wife.

## 2020-10-02 NOTE — PROGRESS NOTES
Transition of care note:RUR 10%    Pt was transferred from the 4th floor to SD in the ICU area and now to [address] [place] [address]. Plan: 1. Plan is for urgent laparoscopic  cholecystecomy today due to CBD stone likely temporaraily causing an obstruction. 2.Pt has been in afib with RVR. 3.Pt may also have an ERCP . 4. Son is Elsie Hong number is [phone]. Torri Francis RN    Addendum-I am requesting for CM specialist to please make a PCP follow-up appointment as pt will likely discharge home this weekend.

## 2020-10-02 NOTE — OP NOTES
Operative Report    Daniel Parish    MRN:  [id number]. Date of Surgery:   10/2/2020     Surgeon:  Neville Vargas MD.      Assistant:    Von Hsieh. Anesthesia:   1. General endotracheal.  2.  0.5% Marcaine. Preoperative Diagnosis:   Acute calculous cholecystitis. Postoperative Diagnosis:   Acute gangrenous calculous cholecystitis. Procedure:   Procedure(s):  ROBOTIC ASSISTED CHOLECYSTECTOMY WITH FIREFLY. Indication:   Daniel Parish is a 82 yrs white gentleman with a history of epigastric pain for several months who presented with acute worsening over night. His US and CT revealed findings c/w acute calculous cholecystitis. He presents at this time for his urgent procedure. Procedure in Detail:  The patient was seen preoperatively in the holding area. The risks, benefits, and expected outcomes were discussed with the patient, and all questions were answered satisfactorily. The patient concurred with the proposed plan, giving informed consent. The patient was injected with IC green intravenously prior to the procedure start time. The patient was taken to the Operating Room. The patient was identified as Daniel Parish, and the procedure verified as Robotic Assisted Laparoscopic Cholecystectomy with Firefly, possible Intraoperative Cholangiogram, possible open. The patient was placed on the OR table in the supine position. Prior to the induction of anesthesia, antibiotic prophylaxis was administered. General endotracheal anesthesia was administered and tolerated well. The patient's abdomen was prepped with Chloraprep and draped in the usual sterile fashion. A Time Out was performed, and the above information was confirmed. Using a 15 blade, a transverse incision was made above the umbilicus after injecting the local anesthetic. The abdominal wall was elevated with towel clips. Using the optiview technique, an 8 mm trocar was introduced into the abdominal cavity. Insufflation was provided through this trocar to establish a pneumoperitoneum of 15 mmHg which the patient tolerated. The camera was inserted through the trocar. The RUQ was visualized, and there were no adhesions noted to prevent a laparoscopic approach. The local anesthetic was injected at all intended trocar sites. Two 8 mm trocars were placed approximately 10 cm lateral to the camera port on either side. A 5 mm trocar was placed in the RLQ along the anterior axillary line for the assistant. The patient was positioned in reverse Trendelenburg and rotated slightly toward the left. The robotic arms were docked. At this time, I scrubbed out and went to the surgeon console. I took control of the robotic arms for the major portion of the procedure. Attention was turned to the right upper quadrant. The liver appeared grossly normal.  The fundus of the gallbladder was grasped and retracted over the dome of the liver. There were adhesions to the gallbladder which were taken down with blunt dissection and cautery. The gallbladder appeared acutely and chronically inflamed, distended, and very long. The gallbladder was decompressed which resulted in spillage of bile and pus. The infundibulum was grasped and retracted laterally. There was a thick inflammatory peel which made identification of the anatomy difficult initially. Using blunt dissection and cautery, the cystic duct was carefully dissected out and clearly visualized entering the gallbladder. The biliary anatomy was confirmed with firefly. The cystic artery was identified posterior to this within Calot's triangle. It was dissected out and clearly visualized entering the gallbladder as well. Once the critical view was obtained, the cystic duct was clipped twice proximally and once distally with Hemolock clips and divided with the endoscissors. The cystic artery was clipped and divided in similar fashion.      Traction was then placed on the gallbladder as it was carefully dissected from the liver bed in retrograde fashion with cautery. Hemostasis was obtained along the liver bed as needed. There was spillage of numerous stones during the dissection. Prior to removing the gallbladder, the RUQ was inspected. The clips along the cystic duct were in place with no evidence of any bile leakage, and the clips along the cystic artery were in place with no evidence of any bleeding. The supraumbilical trocar was up-sized to a 12 mm trocar. The stones were retrieved as best possible. The gallbladder was retrieved intact via an Endocatch bag and pulled out through the supraumbilical incision. The gallbladder was passed off as a specimen. At this time, I scrubbed back in and went to the patient's bedside. The fascia at the supraumbilical incision was closed with a running 0-0 Vicryl. Attention was turned back to the RUQ. The right upper quadrant and gallbladder fossa were copiously irrigated with saline until effluent was clear. A 19 FR Rupesh drain was placed into the gallbladder fossa and exited through the RUQ incision as the trocar was removed. The RLQ trocar was removed under direct laparoscopic visualization, and there was no bleeding noted from this site. The laparoscope was removed, and the abdomen was decompressed. The LUQ trocar was then removed. Hemostasis was obtained within the wounds as needed with cautery. The skin at all sites was approximated with 4-0 Monocryl in the usual subcuticular fashion. The wounds were cleaned and dried, and steri-strips and Bandaids were applied. The patient was extubated in the room. Estimated Blood Loss:  Approximately 100 ml. Specimen:   ID Type Source Tests Collected by Time Destination   1 : Gallbladder Preservative Gallbladder  Lisha Valencia MD 10/2/2020 2467 Pathology               Implants:  A 19 FR Rupesh drain was placed into the gallbladder fossa. Findings:   1.   A grossly normal-appearing liver. 2.  An acutely and chronically inflamed, thickened, distended, gallbladder with patchy areas of gangrene filled with pus and encased by the omentum. 3.  Multiple large gallstones. Counts: All sponge, needle, and instrument counts were correct x 2. Complications:    None. Disposition:   The patient was transferred to the recovery room in stable room, having tolerated the procedure and anesthesia well.         Signed By: Bushra Lucero MD     October 2, 2020            Nick Garcia MD

## 2020-10-02 NOTE — ROUTINE PROCESS
Hospital follow-up PCP transitional care appointment has been scheduled with Dr. Salud Louis for Oct 9 @ 10AM.  Pending patient discharge.   Peña Doran, Care Management Specialist.

## 2020-10-02 NOTE — PERIOP NOTES
TRANSFER - IN REPORT:    Verbal report received from DRAKE Cochran(name) on Rodney   being received from [facility](unit) for ordered procedure      Report consisted of patients Situation, Background, Assessment and   Recommendations(SBAR). Information from the following report(s) MAR, Recent Results, Cardiac Rhythm Afib and Pre Procedure Checklist was reviewed with the receiving nurse. Opportunity for questions and clarification was provided. Assessment completed upon patients arrival to unit and care assumed.

## 2020-10-02 NOTE — ANESTHESIA PREPROCEDURE EVALUATION
Relevant Problems   No relevant active problems       Anesthetic History     PONV          Review of Systems / Medical History  Patient summary reviewed and pertinent labs reviewed    Pulmonary  Within defined limits                 Neuro/Psych       CVA: no residual symptoms       Cardiovascular    Hypertension        Dysrhythmias : atrial fibrillation        Comments: LBBB   GI/Hepatic/Renal  Within defined limits              Endo/Other  Within defined limits           Other Findings              Physical Exam    Airway  Mallampati: II  TM Distance: 4 - 6 cm  Neck ROM: normal range of motion   Mouth opening: Normal     Cardiovascular  Regular rate and rhythm,  S1 and S2 normal,  no murmur, click, rub, or gallop  Rhythm: regular  Rate: normal         Dental  No notable dental hx       Pulmonary  Breath sounds clear to auscultation               Abdominal  GI exam deferred       Other Findings            Anesthetic Plan    ASA: 3  Anesthesia type: general          Induction: Intravenous  Anesthetic plan and risks discussed with: Patient

## 2020-10-02 NOTE — PERIOP NOTES
TRANSFER - OUT REPORT:    Verbal report given to DRAKE Calvert on Naresh Abbasi  being transferred to  for routine post - op       Report consisted of patients Situation, Background, Assessment and   Recommendations(SBAR). Information from the following report(s) SBAR, OR Summary, Intake/Output, MAR, Med Rec Status and Cardiac Rhythm irregular, pac was reviewed with the receiving nurse. Lines:   Peripheral IV 09/30/20 Left Antecubital (Active)   Site Assessment Clean, dry, & intact 10/02/20 1750   Phlebitis Assessment 0 10/02/20 1750   Infiltration Assessment 0 10/02/20 1750   Dressing Status Clean, dry, & intact; Occlusive 10/02/20 1750   Dressing Type Tape;Transparent 10/02/20 1750   Hub Color/Line Status Pink;Capped 10/02/20 1750   Action Taken Open ports on tubing capped 10/02/20 0952   Alcohol Cap Used Yes 10/02/20 1217       Peripheral IV 10/02/20 Right Antecubital (Active)   Site Assessment Clean, dry, & intact 10/02/20 1750   Phlebitis Assessment 0 10/02/20 1750   Infiltration Assessment 0 10/02/20 1750   Dressing Status Clean, dry, & intact; Occlusive 10/02/20 1750   Dressing Type Tape;Transparent 10/02/20 1750   Hub Color/Line Status Pink; Infusing 10/02/20 1750   Action Taken Open ports on tubing capped 10/02/20 0952   Alcohol Cap Used Yes 10/02/20 1217       Peripheral IV 10/02/20 Forearm (Active)   Site Assessment Clean, dry, & intact 10/02/20 1750   Phlebitis Assessment 0 10/02/20 1750   Infiltration Assessment 0 10/02/20 1750   Dressing Status Clean, dry, & intact; Occlusive 10/02/20 1750   Dressing Type Tape;Transparent 10/02/20 1750   Hub Color/Line Status Pink;Capped 10/02/20 1750   Action Taken Open ports on tubing capped 10/02/20 0952   Alcohol Cap Used Yes 10/02/20 1217        Opportunity for questions and clarification was provided.       Patient transported with:   O2 @ 2 liters  Registered Nurse

## 2020-10-03 PROBLEM — Z90.49 S/P LAPAROSCOPIC CHOLECYSTECTOMY: Status: ACTIVE | Noted: 2020-10-03

## 2020-10-03 LAB
ALBUMIN SERPL-MCNC: 2.4 G/DL (ref 3.5–5)
ALBUMIN/GLOB SERPL: 1 {RATIO} (ref 1.1–2.2)
ALP SERPL-CCNC: 63 U/L (ref 45–117)
ALT SERPL-CCNC: 39 U/L (ref 12–78)
ANION GAP SERPL CALC-SCNC: 6 MMOL/L (ref 5–15)
AST SERPL-CCNC: 55 U/L (ref 15–37)
BASOPHILS # BLD: 0 K/UL (ref 0–0.1)
BASOPHILS NFR BLD: 0 % (ref 0–1)
BILIRUB DIRECT SERPL-MCNC: 0.5 MG/DL (ref 0–0.2)
BILIRUB SERPL-MCNC: 1 MG/DL (ref 0.2–1)
BUN SERPL-MCNC: 25 MG/DL (ref 6–20)
BUN/CREAT SERPL: 22 (ref 12–20)
CALCIUM SERPL-MCNC: 7.5 MG/DL (ref 8.5–10.1)
CHLORIDE SERPL-SCNC: 107 MMOL/L (ref 97–108)
CO2 SERPL-SCNC: 25 MMOL/L (ref 21–32)
CREAT SERPL-MCNC: 1.13 MG/DL (ref 0.7–1.3)
DIFFERENTIAL METHOD BLD: ABNORMAL
EOSINOPHIL # BLD: 0 K/UL (ref 0–0.4)
EOSINOPHIL NFR BLD: 0 % (ref 0–7)
ERYTHROCYTE [DISTWIDTH] IN BLOOD BY AUTOMATED COUNT: 13.1 % (ref 11.5–14.5)
GLOBULIN SER CALC-MCNC: 2.5 G/DL (ref 2–4)
GLUCOSE SERPL-MCNC: 114 MG/DL (ref 65–100)
HCT VFR BLD AUTO: 43.1 % (ref 36.6–50.3)
HGB BLD-MCNC: 14.5 G/DL (ref 12.1–17)
IMM GRANULOCYTES # BLD AUTO: 0.1 K/UL (ref 0–0.04)
IMM GRANULOCYTES NFR BLD AUTO: 1 % (ref 0–0.5)
LYMPHOCYTES # BLD: 0.8 K/UL (ref 0.8–3.5)
LYMPHOCYTES NFR BLD: 7 % (ref 12–49)
MAGNESIUM SERPL-MCNC: 2 MG/DL (ref 1.6–2.4)
MCH RBC QN AUTO: 32.6 PG (ref 26–34)
MCHC RBC AUTO-ENTMCNC: 33.6 G/DL (ref 30–36.5)
MCV RBC AUTO: 96.9 FL (ref 80–99)
MONOCYTES # BLD: 0.8 K/UL (ref 0–1)
MONOCYTES NFR BLD: 6 % (ref 5–13)
NEUTS SEG # BLD: 10.7 K/UL (ref 1.8–8)
NEUTS SEG NFR BLD: 86 % (ref 32–75)
NRBC # BLD: 0 K/UL (ref 0–0.01)
NRBC BLD-RTO: 0 PER 100 WBC
PLATELET # BLD AUTO: 121 K/UL (ref 150–400)
PMV BLD AUTO: 10.2 FL (ref 8.9–12.9)
POTASSIUM SERPL-SCNC: 4 MMOL/L (ref 3.5–5.1)
PROT SERPL-MCNC: 4.9 G/DL (ref 6.4–8.2)
RBC # BLD AUTO: 4.45 M/UL (ref 4.1–5.7)
SODIUM SERPL-SCNC: 138 MMOL/L (ref 136–145)
WBC # BLD AUTO: 12.4 K/UL (ref 4.1–11.1)

## 2020-10-03 PROCEDURE — 97162 PT EVAL MOD COMPLEX 30 MIN: CPT

## 2020-10-03 PROCEDURE — 65660000000 HC RM CCU STEPDOWN

## 2020-10-03 PROCEDURE — 94760 N-INVAS EAR/PLS OXIMETRY 1: CPT

## 2020-10-03 PROCEDURE — 65270000029 HC RM PRIVATE

## 2020-10-03 PROCEDURE — 36415 COLL VENOUS BLD VENIPUNCTURE: CPT

## 2020-10-03 PROCEDURE — 74011250636 HC RX REV CODE- 250/636: Performed by: SURGERY

## 2020-10-03 PROCEDURE — 99024 POSTOP FOLLOW-UP VISIT: CPT | Performed by: SURGERY

## 2020-10-03 PROCEDURE — 2709999900 HC NON-CHARGEABLE SUPPLY

## 2020-10-03 PROCEDURE — 83735 ASSAY OF MAGNESIUM: CPT

## 2020-10-03 PROCEDURE — 97116 GAIT TRAINING THERAPY: CPT

## 2020-10-03 PROCEDURE — 80048 BASIC METABOLIC PNL TOTAL CA: CPT

## 2020-10-03 PROCEDURE — 74011250637 HC RX REV CODE- 250/637: Performed by: SURGERY

## 2020-10-03 PROCEDURE — 80076 HEPATIC FUNCTION PANEL: CPT

## 2020-10-03 PROCEDURE — 85025 COMPLETE CBC W/AUTO DIFF WBC: CPT

## 2020-10-03 PROCEDURE — 77010033678 HC OXYGEN DAILY

## 2020-10-03 RX ADMIN — Medication 10 ML: at 20:56

## 2020-10-03 RX ADMIN — LOSARTAN POTASSIUM 50 MG: 50 TABLET, FILM COATED ORAL at 09:43

## 2020-10-03 RX ADMIN — FAMOTIDINE 20 MG: 10 INJECTION INTRAVENOUS at 09:43

## 2020-10-03 RX ADMIN — FAMOTIDINE 20 MG: 10 INJECTION INTRAVENOUS at 20:55

## 2020-10-03 RX ADMIN — Medication 10 ML: at 05:38

## 2020-10-03 RX ADMIN — METRONIDAZOLE 500 MG: 500 INJECTION, SOLUTION INTRAVENOUS at 20:55

## 2020-10-03 RX ADMIN — METRONIDAZOLE 500 MG: 500 INJECTION, SOLUTION INTRAVENOUS at 09:43

## 2020-10-03 RX ADMIN — SODIUM CHLORIDE, SODIUM LACTATE, POTASSIUM CHLORIDE, AND CALCIUM CHLORIDE 125 ML/HR: 600; 310; 30; 20 INJECTION, SOLUTION INTRAVENOUS at 02:18

## 2020-10-03 RX ADMIN — ROSUVASTATIN CALCIUM 10 MG: 10 TABLET, COATED ORAL at 20:56

## 2020-10-03 RX ADMIN — LEVOFLOXACIN 500 MG: 5 INJECTION, SOLUTION INTRAVENOUS at 09:43

## 2020-10-03 RX ADMIN — HYDROCODONE BITARTRATE AND ACETAMINOPHEN 1 TABLET: 5; 325 TABLET ORAL at 05:43

## 2020-10-03 RX ADMIN — SODIUM CHLORIDE, SODIUM LACTATE, POTASSIUM CHLORIDE, AND CALCIUM CHLORIDE 125 ML/HR: 600; 310; 30; 20 INJECTION, SOLUTION INTRAVENOUS at 09:49

## 2020-10-03 NOTE — PROGRESS NOTES
Progress Note    Patient: Daniel Parish MRN: [id number]  SSN: [id number]    YOB: 1935  Age: 82 y.o. Sex: male      Admit Date: 2020    1 Day Post-Op    Procedure:  Procedure(s):  ROBOTIC ASSISTED CHOLECYSTECTOMY WITH FIREFLY    Subjective:     Mr. Alex Dalton feels better. His pain is controlled. He tolerated clears. Objective:     Visit Vitals  /66 (BP 1 Location: Left arm, BP Patient Position: At rest;Supine)   Pulse 70   Temp 98.2 °F (36.8 °C)   Resp 17   Ht 6' (1.829 m)   Wt 234 lb (106.1 kg)   SpO2 93%   BMI 31.74 kg/m²       Temp (24hrs), Av.3 °F (36.8 °C), Min:97.6 °F (36.4 °C), Max:99.4 °F (37.4 °C)      Physical Exam:    GENERAL: alert, cooperative, no distress, EYE: negative findings: anicteric sclera, ABDOMEN: Soft, distended, appropriately tender. The dressings are intact and dry. The CHELA fluid is serosanguinous.     Lab Review:   BMP:   Lab Results   Component Value Date/Time     10/03/2020 03:37 AM    K 4.0 10/03/2020 03:37 AM     10/03/2020 03:37 AM    CO2 25 10/03/2020 03:37 AM    AGAP 6 10/03/2020 03:37 AM     (H) 10/03/2020 03:37 AM    BUN 25 (H) 10/03/2020 03:37 AM    CREA 1.13 10/03/2020 03:37 AM    GFRAA >60 10/03/2020 03:37 AM    GFRNA >60 10/03/2020 03:37 AM     CBC:   Lab Results   Component Value Date/Time    WBC 12.4 (H) 10/03/2020 03:37 AM    HGB 14.5 10/03/2020 03:37 AM    HCT 43.1 10/03/2020 03:37 AM     (L) 10/03/2020 03:37 AM     Liver Panel:   Lab Results   Component Value Date/Time    ALB 2.4 (L) 10/03/2020 03:37 AM    CBIL 0.5 (H) 10/03/2020 03:37 AM    TP 4.9 (L) 10/03/2020 03:37 AM    GLOB 2.5 10/03/2020 03:37 AM    AGRAT 1.0 (L) 10/03/2020 03:37 AM    ALT 39 10/03/2020 03:37 AM    AP 63 10/03/2020 03:37 AM       Assessment:     Hospital Problems  Date Reviewed: 2020          Codes Class Noted POA    S/P laparoscopic cholecystectomy ICD-10-CM: Z90.49  ICD-9-CM: V45.89  10/3/2020 No    Overview Signed 10/3/2020 11:05 AM by Jenniffer Ornelas MD     Robotic-assisted with firefly. * (Principal) Acute calculous cholecystitis ICD-10-CM: K80.00  ICD-9-CM: 574.00  9/30/2020 Yes              Plan/Recommendations/Medical Decision Making:     Hb stable. Start Eliquis tomorrow. WBC down, T. Bilirubin normal, continue IV antibiotics and CHELA. Regular diet. Creatinine normal.  OOB and ambulating/PT. Transfer to floor. Appreciate Medicine assistance.   OOB

## 2020-10-03 NOTE — PROGRESS NOTES
[facility] [address]  [address] [facility], [address] [place] [address]  [phone]      Medical Progress Note      NAME: Jaylene Duncan   :  1935  MRM:  [id number]    Date/Time of service: 10/3/2020  10:42 AM       Subjective:     Chief Complaint:  Patient was personally seen and examined by me during this time period. Chart reviewed. S/p post day 1 cholecystectomy for acute gangrenous calculous cholecystitis; states abd pain well controlled with current pain level 1/10. Review of Systems:  (bold if positive, if negative)    Gen:  Eyes:  ENT:  CVS:  Pulm:  GI:  Abdominal painGU:  MS:  Skin:  Psych:  Endo:  Hem:  Renal:  Neuro:              Objective:       Vitals:       Last 24hrs VS reviewed since prior progress note.  Most recent are:    Visit Vitals  /66 (BP 1 Location: Left arm, BP Patient Position: At rest;Supine)   Pulse 70   Temp 98.2 °F (36.8 °C)   Resp 17   Ht 6' (1.829 m)   Wt 106.1 kg (234 lb)   SpO2 93%   BMI 31.74 kg/m²     SpO2 Readings from Last 6 Encounters:   10/03/20 93%   20 97%   19 95%   19 93%   19 95%   18 95%    O2 Flow Rate (L/min): 2 l/min       Intake/Output Summary (Last 24 hours) at 10/3/2020 1042  Last data filed at 10/3/2020 0745  Gross per 24 hour   Intake 3347.92 ml   Output 1020 ml   Net 2327.92 ml        Exam:     Physical Exam:    Gen:  Well-developed, well-nourished, in no acute distress  HEENT:  Pink conjunctivae, PERRL, hearing intact to voice  Resp:  No accessory muscle use, clear breath sounds without wheezes rales or rhonchi  Card:  RRR, No murmurs, normal S1, S2, no peripheral edema  Abd:  Soft, non-tender, non-distended, surgical site with dressing c/d/i, serosanguinous fluid in CHELA drain    Musc:  No cyanosis or clubbing  Skin:  surgical site with dressing c/d/i   Neuro:  Cranial nerves 3-12 are grossly intact, strength 5/5 bilaterally UEs and LEs, follows commands appropriately  Psych:  Oriented to person, place, and time, Alert with good insight      Medications Reviewed: (see below)    Lab Data Reviewed: (see below)    ______________________________________________________________________    Medications:     Current Facility-Administered Medications   Medication Dose Route Frequency    sodium chloride (NS) flush 5-10 mL  5-10 mL IntraVENous PRN    sodium chloride (NS) flush 5-40 mL  5-40 mL IntraVENous Q8H    sodium chloride (NS) flush 5-40 mL  5-40 mL IntraVENous PRN    ondansetron (ZOFRAN) injection 4 mg  4 mg IntraVENous Q4H PRN    naloxone (NARCAN) injection 0.4 mg  0.4 mg IntraVENous PRN    acetaminophen (TYLENOL) tablet 650 mg  650 mg Oral Q6H PRN    HYDROcodone-acetaminophen (NORCO) 5-325 mg per tablet 1 Tab  1 Tab Oral Q4H PRN    HYDROmorphone (DILAUDID) syringe 1 mg  1 mg IntraVENous Q3H PRN    lactated Ringers infusion  125 mL/hr IntraVENous CONTINUOUS    levoFLOXacin (LEVAQUIN) 500 mg in D5W IVPB  500 mg IntraVENous Q24H    metroNIDAZOLE (FLAGYL) IVPB premix 500 mg  500 mg IntraVENous Q12H    rosuvastatin (CRESTOR) tablet 10 mg  10 mg Oral QHS    losartan (COZAAR) tablet 50 mg  50 mg Oral DAILY    hydrALAZINE (APRESOLINE) 20 mg/mL injection 10 mg  10 mg IntraVENous Q6H PRN    famotidine (PF) (PEPCID) 20 mg in 0.9% sodium chloride 10 mL injection  20 mg IntraVENous Q12H    prochlorperazine (COMPAZINE) injection 5 mg  5 mg IntraVENous Q6H PRN          Lab Review:     Recent Labs     10/03/20  0337 10/02/20  0714 10/01/20  1545   WBC 12.4* 15.8* 17.2*   HGB 14.5 15.9 17.1*   HCT 43.1 46.7 50.2   * 127* 131*     Recent Labs     10/03/20  0337 10/02/20  0345 10/02/20  0340    134*  --    K 4.0 3.5  --     103  --    CO2 25 25  --    * 96  --    BUN 25* 19  --    CREA 1.13 1.28  --    CA 7.5* 8.1*  --    MG 2.0 1.8  --    ALB 2.4* 2.9* 2.7*   TBILI 1.0 1.9* 1.9*   ALT 39 15 17     Lab Results   Component Value Date/Time    Glucose (POC) 101 (H) 10/02/2020 02:19 AM Glucose (POC) 185 (H) 07/03/2019 07:17 PM          Assessment / Plan:     Sepsis due to acute gangrenous calculous cholecystitis: improving, leukocytosis trending down. blood and urine cultures negative to date. Continue Levaquin and flagyl. S/p post day 1 cholecystectomy for acute gangrenous calculous cholecystitis: Continue LR while NPO. Continue pain management with IV dilaudid. CHELA management per surgery. Await surgery recs on diet advancement. Embolic CVA with splenic infarct: eliqus held prior ro surgery. Will await surgery recs on restarting eliqus. Left bundle-branch block: no acute concerns. Benign prostatic hypertrophy: no acute issues. Bladder scan PRN.        Total time spent with patient: 39 Minutes **I personally saw and examined the patient during this time period**                 Care Plan discussed with: Patient and Family    Discussed:  Care Plan    Prophylaxis:  SCD's    Disposition:  Home w/Family           ___________________________________________________    Attending Physician: Jeremi Koroma DO

## 2020-10-03 NOTE — PROGRESS NOTES
Problem: Mobility Impaired (Adult and Pediatric)  Goal: *Acute Goals and Plan of Care (Insert Text)  Description: FUNCTIONAL STATUS PRIOR TO ADMISSION: Patient was independent and active without use of DME. He reports balance deficits x 1.5 years, sudden onset and most notable in low light situations. HOME SUPPORT PRIOR TO ADMISSION: The patient lived with family but did not require assist.    Physical Therapy Goals  Initiated 10/3/2020  1. Patient will move from supine to sit and sit to supine  in bed with supervision/set-up within 7 day(s). 2.  Patient will transfer from bed to chair and chair to bed with supervision/set-up using the least restrictive device within 7 day(s). 3.  Patient will perform sit to stand with supervision/set-up within 7 day(s). 4.  Patient will ambulate with supervision/set-up for 150 feet with the least restrictive device within 7 day(s). 5.  Patient will ascend/descend 15 stairs with one handrail(s) with supervision/set-up within 7 day(s). Outcome: Progressing Towards Goal    PHYSICAL THERAPY EVALUATION  Patient: Jabier Chinchilla (4 y.o. male)  Date: 10/3/2020  Primary Diagnosis: Acute calculous cholecystitis [K80.00]  Acute calculous cholecystitis [K80.00]  Procedure(s) (LRB):  ROBOTIC ASSISTED CHOLECYSTECTOMY WITH FIREFLY (N/A) 1 Day Post-Op   Precautions: Falls       ASSESSMENT  Based on the objective data described below, the patient presents with generally decreased strength, decreased endurance, impaired high level balance, and limited functional mobility on POD 1 s/p lap sukumar for acute cholecystitis. He presents with flat affect but excellent participation and good activity tolerance. He mobilizes to edge of bed, dangles x 6 mins, transfers, and ambulates briefly in room using RW. VSS and he denies pain with ambulation. Reviewed mobility techniques and abdominal bracing during bed mobility, fall risk reduction strategies, and benefits of RW use.      Current Level of Function Impacting Discharge (mobility/balance): mod assist bed mobility. Functional Outcome Measure: The patient scored 55 on the Barthel outcome measure which is indicative of 45% functional impairment. Other factors to consider for discharge: good motivation     Patient will benefit from skilled therapy intervention to address the above noted impairments. PLAN :  Recommendations and Planned Interventions: bed mobility training, transfer training, gait training, therapeutic exercises, neuromuscular re-education, edema management/control, patient and family training/education, and therapeutic activities      Frequency/Duration: Patient will be followed by physical therapy:  5 times a week to address goals. Recommendation for discharge: (in order for the patient to meet his/her long term goals)  Physical therapy at least 2 days/week in the home AND ensure assist and/or supervision for safety with all functional mobility    This discharge recommendation:  Has not yet been discussed the attending provider and/or case management    IF patient discharges home will need the following DME: to be determined (TBD); possibly RW. SUBJECTIVE:   Patient stated It feels good to walk around.     Pt received supine, agreeable to PT and cleared by RN. 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     Vertigo      Past Surgical History:   Procedure Laterality Date    HX TONSIL AND ADENOIDECTOMY         Personal factors and/or comorbidities impacting plan of care: as above    Home Situation  Home Environment: Private residence  # Steps to Enter: 4  Rails to Enter: Yes  Hand Rails : Left  One/Two Story Residence: Two story  # of Interior Steps: 13  Interior Rails: Left  Living Alone: No  Support Systems: Family member(s)  Patient Expects to be Discharged to[de-identified] Private residence  Current DME Used/Available at Home: (pt uncertain)    EXAMINATION/PRESENTATION/DECISION MAKING:   Critical Behavior:  Neurologic State: Alert  Orientation Level: Oriented X4  Cognition: Follows commands, Appropriate decision making, Appropriate for age attention/concentration, Appropriate safety awareness     Hearing: Auditory  Auditory Impairment: Hard of hearing, bilateral  Hearing Aids/Status: Does not own  Skin:  LE exposed skin intact; CHELA drain secured for mobility. Edema: none noted LEs  Range Of Motion:  AROM: Generally decreased, functional                       Strength:    Strength: Generally decreased, functional                    Tone & Sensation:   Tone: Normal              Sensation: Intact               Coordination:  Coordination: Within functional limits       Functional Mobility:  Bed Mobility:  Rolling: Additional time; Moderate assistance  Supine to Sit: Additional time; Moderate assistance;Bed Modified; Adaptive equipment     Scooting: Additional time;Contact guard assistance  Transfers:  Sit to Stand: Additional time;Minimum assistance  Stand to Sit: Additional time;Contact guard assistance                       Balance:   Sitting: Without support  Sitting - Static: Good (unsupported)  Sitting - Dynamic: Good (unsupported)  Standing: With support  Standing - Static: Good  Standing - Dynamic : Fair  Ambulation/Gait Training:  Distance (ft): 25 Feet (ft)  Assistive Device: Walker, rolling;Gait belt  Ambulation - Level of Assistance: Minimal assistance        Gait Abnormalities: Decreased step clearance; Antalgic        Base of Support: Widened     Speed/Ursula: Pace decreased (<100 feet/min)                                 Functional Measure:  Barthel Index:    Bathin  Bladder: 10  Bowels: 10  Groomin  Dressin  Feeding: 10  Mobility: 0  Stairs: 0  Toilet Use: 5  Transfer (Bed to Chair and Back): 10  Total: 55/100       The Barthel ADL Index: Guidelines  1. The index should be used as a record of what a patient does, not as a record of what a patient could do. 2. The main aim is to establish degree of independence from any help, physical or verbal, however minor and for whatever reason. 3. The need for supervision renders the patient not independent. 4. A patient's performance should be established using the best available evidence. Asking the patient, friends/relatives and nurses are the usual sources, but direct observation and common sense are also important. However direct testing is not needed. 5. Usually the patient's performance over the preceding 24-48 hours is important, but occasionally longer periods will be relevant. 6. Middle categories imply that the patient supplies over 50 per cent of the effort. 7. Use of aids to be independent is allowed. Angie Pickett., Barthel, D.W. (0818). Functional evaluation: the Barthel Index. [address] [facility] (14)2. Sopiha Lindsey johanna TAINA Armenta, Isaac Alston., Anish Luna., Hellen, [address] (). Measuring the change indisability after inpatient rehabilitation; comparison of the responsiveness of the Barthel Index and Functional Wilcox Measure. Journal of Neurology, Neurosurgery, and Psychiatry, 66(4), 912-565. Jacinta Pickard, N.J.A, CHRIS WeberJ.JASE, & Elroy Ayers, M.A. (2004.) Assessment of post-stroke quality of life in cost-effectiveness studies: The usefulness of the Barthel Index and the EuroQoL-5D.  Quality of Life Research, 15, 759-69            Physical Therapy Evaluation Charge Determination   History Examination Presentation Decision-Making   HIGH Complexity :3+ comorbidities / personal factors will impact the outcome/ POC  HIGH Complexity : 4+ Standardized tests and measures addressing body structure, function, activity limitation and / or participation in recreation  MEDIUM Complexity : Evolving with changing characteristics  Other outcome measures barthel  MEDIUM      Based on the above components, the patient evaluation is determined to be of the following complexity level: MEDIUM    Pain Ratin/10 at rest; 5/10 when coughing. Offered rest, repositioning, education    Activity Tolerance:   Good  Please refer to the flowsheet for vital signs taken during this treatment. After treatment patient left in no apparent distress:   Sitting in chair, Call bell within reach, and Bed / chair alarm activated    COMMUNICATION/EDUCATION:   The patients plan of care was discussed with: Registered nurse and Rehabilitation technician. Fall prevention education was provided and the patient/caregiver indicated understanding., Patient/family have participated as able in goal setting and plan of care. , and Patient/family agree to work toward stated goals and plan of care.     Thank you for this referral.  Agueda Luz, PT, DPT   Time Calculation: 32 mins

## 2020-10-03 NOTE — PROGRESS NOTES
0715  Bedside and Verbal shift change report given to [facility] (oncoming nurse) by Pascual Puckett (offgoing nurse). Report included the following information SBAR, Kardex, Procedure Summary, Intake/Output, MAR, Accordion, Recent Results and Cardiac Rhythm NSR with Left BBB. Patient complaining of 8/10 pain when coughing. Drained 40 ml of serosanguinous fluid from the CHELA. Call bell within reach. Aware to call before getting out of bed. Will continue to monitor. 1111  As per Dr. Ananda Turcios, we can transfer to 4th floor once we get clearance from cardio. [facility]  Dr. Pema Claros cleared 336. Transfer to remote tele.

## 2020-10-03 NOTE — PROGRESS NOTES
TRANSFER - IN REPORT:    Verbal report received from Peterson(name) on Rina Rubi  being received from [facility](unit) for routine progression of care      Report consisted of patients Situation, Background, Assessment and   Recommendations(SBAR). Information from the following report(s) SBAR, Kardex and ED Summary was reviewed with the receiving nurse. Opportunity for questions and clarification was provided. Assessment completed upon patients arrival to unit and care assumed.

## 2020-10-04 LAB
ALBUMIN SERPL-MCNC: 2.2 G/DL (ref 3.5–5)
ALBUMIN/GLOB SERPL: 0.7 {RATIO} (ref 1.1–2.2)
ALP SERPL-CCNC: 61 U/L (ref 45–117)
ALT SERPL-CCNC: 32 U/L (ref 12–78)
ANION GAP SERPL CALC-SCNC: 4 MMOL/L (ref 5–15)
APPEARANCE UR: CLEAR
AST SERPL-CCNC: 30 U/L (ref 15–37)
BACTERIA URNS QL MICRO: NEGATIVE /HPF
BASOPHILS # BLD: 0 K/UL (ref 0–0.1)
BASOPHILS NFR BLD: 0 % (ref 0–1)
BILIRUB DIRECT SERPL-MCNC: 0.2 MG/DL (ref 0–0.2)
BILIRUB SERPL-MCNC: 0.6 MG/DL (ref 0.2–1)
BILIRUB UR QL: NEGATIVE
BUN SERPL-MCNC: 28 MG/DL (ref 6–20)
BUN/CREAT SERPL: 26 (ref 12–20)
CALCIUM SERPL-MCNC: 7.7 MG/DL (ref 8.5–10.1)
CHLORIDE SERPL-SCNC: 110 MMOL/L (ref 97–108)
CO2 SERPL-SCNC: 25 MMOL/L (ref 21–32)
COLOR UR: ABNORMAL
CREAT SERPL-MCNC: 1.08 MG/DL (ref 0.7–1.3)
DIFFERENTIAL METHOD BLD: ABNORMAL
EOSINOPHIL # BLD: 0 K/UL (ref 0–0.4)
EOSINOPHIL NFR BLD: 0 % (ref 0–7)
EPITH CASTS URNS QL MICRO: ABNORMAL /LPF
ERYTHROCYTE [DISTWIDTH] IN BLOOD BY AUTOMATED COUNT: 13.2 % (ref 11.5–14.5)
GLOBULIN SER CALC-MCNC: 3.2 G/DL (ref 2–4)
GLUCOSE SERPL-MCNC: 108 MG/DL (ref 65–100)
GLUCOSE UR STRIP.AUTO-MCNC: NEGATIVE MG/DL
HCT VFR BLD AUTO: 41.5 % (ref 36.6–50.3)
HGB BLD-MCNC: 14.2 G/DL (ref 12.1–17)
HGB UR QL STRIP: NEGATIVE
HYALINE CASTS URNS QL MICRO: ABNORMAL /LPF (ref 0–5)
IMM GRANULOCYTES # BLD AUTO: 0 K/UL (ref 0–0.04)
IMM GRANULOCYTES NFR BLD AUTO: 0 % (ref 0–0.5)
KETONES UR QL STRIP.AUTO: NEGATIVE MG/DL
LEUKOCYTE ESTERASE UR QL STRIP.AUTO: NEGATIVE
LYMPHOCYTES # BLD: 1.3 K/UL (ref 0.8–3.5)
LYMPHOCYTES NFR BLD: 13 % (ref 12–49)
MAGNESIUM SERPL-MCNC: 2.3 MG/DL (ref 1.6–2.4)
MCH RBC QN AUTO: 32.6 PG (ref 26–34)
MCHC RBC AUTO-ENTMCNC: 34.2 G/DL (ref 30–36.5)
MCV RBC AUTO: 95.2 FL (ref 80–99)
MONOCYTES # BLD: 0.7 K/UL (ref 0–1)
MONOCYTES NFR BLD: 6 % (ref 5–13)
NEUTS SEG # BLD: 8.4 K/UL (ref 1.8–8)
NEUTS SEG NFR BLD: 81 % (ref 32–75)
NITRITE UR QL STRIP.AUTO: NEGATIVE
NRBC # BLD: 0 K/UL (ref 0–0.01)
NRBC BLD-RTO: 0 PER 100 WBC
PH UR STRIP: 6 [PH] (ref 5–8)
PLATELET # BLD AUTO: 150 K/UL (ref 150–400)
PMV BLD AUTO: 9.6 FL (ref 8.9–12.9)
POTASSIUM SERPL-SCNC: 3.5 MMOL/L (ref 3.5–5.1)
PROT SERPL-MCNC: 5.4 G/DL (ref 6.4–8.2)
PROT UR STRIP-MCNC: ABNORMAL MG/DL
RBC # BLD AUTO: 4.36 M/UL (ref 4.1–5.7)
RBC #/AREA URNS HPF: ABNORMAL /HPF (ref 0–5)
SODIUM SERPL-SCNC: 139 MMOL/L (ref 136–145)
SP GR UR REFRACTOMETRY: 1.02 (ref 1–1.03)
UA: UC IF INDICATED,UAUC: ABNORMAL
UROBILINOGEN UR QL STRIP.AUTO: 0.2 EU/DL (ref 0.2–1)
WBC # BLD AUTO: 10.5 K/UL (ref 4.1–11.1)
WBC URNS QL MICRO: ABNORMAL /HPF (ref 0–4)

## 2020-10-04 PROCEDURE — 97116 GAIT TRAINING THERAPY: CPT

## 2020-10-04 PROCEDURE — 94760 N-INVAS EAR/PLS OXIMETRY 1: CPT

## 2020-10-04 PROCEDURE — 99024 POSTOP FOLLOW-UP VISIT: CPT | Performed by: SURGERY

## 2020-10-04 PROCEDURE — 80048 BASIC METABOLIC PNL TOTAL CA: CPT

## 2020-10-04 PROCEDURE — 97530 THERAPEUTIC ACTIVITIES: CPT

## 2020-10-04 PROCEDURE — 83735 ASSAY OF MAGNESIUM: CPT

## 2020-10-04 PROCEDURE — 81001 URINALYSIS AUTO W/SCOPE: CPT

## 2020-10-04 PROCEDURE — 74011250637 HC RX REV CODE- 250/637: Performed by: SURGERY

## 2020-10-04 PROCEDURE — 74011250636 HC RX REV CODE- 250/636: Performed by: SURGERY

## 2020-10-04 PROCEDURE — 85025 COMPLETE CBC W/AUTO DIFF WBC: CPT

## 2020-10-04 PROCEDURE — 77030019563 HC DEV ATTCH FEED HOLL -A

## 2020-10-04 PROCEDURE — 65660000000 HC RM CCU STEPDOWN

## 2020-10-04 PROCEDURE — 36415 COLL VENOUS BLD VENIPUNCTURE: CPT

## 2020-10-04 PROCEDURE — 80076 HEPATIC FUNCTION PANEL: CPT

## 2020-10-04 RX ORDER — FACIAL-BODY WIPES
10 EACH TOPICAL ONCE
Status: DISPENSED | OUTPATIENT
Start: 2020-10-04 | End: 2020-10-05

## 2020-10-04 RX ORDER — POLYETHYLENE GLYCOL 3350 17 G/17G
17 POWDER, FOR SOLUTION ORAL DAILY
Status: DISCONTINUED | OUTPATIENT
Start: 2020-10-04 | End: 2020-10-05 | Stop reason: HOSPADM

## 2020-10-04 RX ORDER — FACIAL-BODY WIPES
10 EACH TOPICAL ONCE
Status: COMPLETED | OUTPATIENT
Start: 2020-10-04 | End: 2020-10-04

## 2020-10-04 RX ORDER — HYDROCHLOROTHIAZIDE 25 MG/1
12.5 TABLET ORAL DAILY
Status: DISCONTINUED | OUTPATIENT
Start: 2020-10-04 | End: 2020-10-05 | Stop reason: HOSPADM

## 2020-10-04 RX ADMIN — ROSUVASTATIN CALCIUM 10 MG: 10 TABLET, COATED ORAL at 21:40

## 2020-10-04 RX ADMIN — LEVOFLOXACIN 500 MG: 5 INJECTION, SOLUTION INTRAVENOUS at 08:08

## 2020-10-04 RX ADMIN — APIXABAN 5 MG: 5 TABLET, FILM COATED ORAL at 10:09

## 2020-10-04 RX ADMIN — FAMOTIDINE 20 MG: 10 INJECTION INTRAVENOUS at 08:07

## 2020-10-04 RX ADMIN — BISACODYL 10 MG: 10 SUPPOSITORY RECTAL at 19:27

## 2020-10-04 RX ADMIN — APIXABAN 5 MG: 5 TABLET, FILM COATED ORAL at 21:40

## 2020-10-04 RX ADMIN — METRONIDAZOLE 500 MG: 500 INJECTION, SOLUTION INTRAVENOUS at 09:16

## 2020-10-04 RX ADMIN — SODIUM CHLORIDE, SODIUM LACTATE, POTASSIUM CHLORIDE, AND CALCIUM CHLORIDE 75 ML/HR: 600; 310; 30; 20 INJECTION, SOLUTION INTRAVENOUS at 00:07

## 2020-10-04 RX ADMIN — HYDROCHLOROTHIAZIDE 12.5 MG: 25 TABLET ORAL at 10:09

## 2020-10-04 RX ADMIN — LOSARTAN POTASSIUM 50 MG: 50 TABLET, FILM COATED ORAL at 08:08

## 2020-10-04 RX ADMIN — Medication 10 ML: at 13:09

## 2020-10-04 RX ADMIN — METRONIDAZOLE 500 MG: 500 INJECTION, SOLUTION INTRAVENOUS at 21:40

## 2020-10-04 RX ADMIN — Medication 10 ML: at 22:01

## 2020-10-04 RX ADMIN — POLYETHYLENE GLYCOL 3350 17 G: 17 POWDER, FOR SOLUTION ORAL at 10:09

## 2020-10-04 NOTE — PROGRESS NOTES
Bedside shift change report given to Brittaney Seth (oncoming nurse) by Pradeep Deutsch (offgoing nurse). Report included the following information SBAR, Kardex, MAR and Recent Results.

## 2020-10-04 NOTE — PROGRESS NOTES
Transition of Care  RUR 12%  Low    1- CM to follow to refer to [facility] of patient and family choice. 2- follow for any other discharge planning needs  3- coordinate if needed, any transportation needs. 2:45 PM  Spoke with son, Magui Cerda, who said his Father is familiar with [facility] as he had them before after a minor stroke for balance issues. He is going to call his cousin to confirm preference and call cm back. 9:46 AM  Consult received to arranged [facility] follow up for after discharge. Working to identify if patient is on for therapy today, as they saw him yesterday and advised home therapy.  Message left for wife to call cm regarding HH preference

## 2020-10-04 NOTE — ROUTINE PROCESS
Bedside and Verbal shift change report given to Paola Cadet (oncoming nurse) by Jose Raza RN (offgoing nurse). Report included the following information SBAR, Kardex, MAR, Accordion and Recent Results.

## 2020-10-04 NOTE — PROGRESS NOTES
[facility]  [address] [facility], [address] [place] [address]  [phone]      Medical Progress Note      NAME: Jeannette Randhawa   :  1935  MRM:  [id number]    Date/Time of service: 10/4/2020  3:04 PM       Subjective:     Chief Complaint:  Patient was personally seen and examined by me during this time period. Chart reviewed. S/p post day 2 cholecystectomy for acute gangrenous calculous cholecystitis. Reports some abd pain with movement but states overall improved. Review of Systems:  (bold if positive, if negative)    Gen:  Eyes:  ENT:  CVS:  Pulm:  GI:  Abdominal painGU:  MS:  Skin:  Psych:  Endo:  Hem:  Renal:  Neuro:              Objective:       Vitals:       Last 24hrs VS reviewed since prior progress note.  Most recent are:    Visit Vitals  BP (!) 170/78 (BP 1 Location: Left arm, BP Patient Position: Sitting)   Pulse 62   Temp 97.7 °F (36.5 °C)   Resp 18   Ht 6' (1.829 m)   Wt 106.1 kg (234 lb)   SpO2 95%   BMI 31.74 kg/m²     SpO2 Readings from Last 6 Encounters:   10/04/20 95%   20 97%   19 95%   19 93%   19 95%   18 95%    O2 Flow Rate (L/min): 2 l/min       Intake/Output Summary (Last 24 hours) at 10/4/2020 1504  Last data filed at 10/4/2020 1309  Gross per 24 hour   Intake 901.25 ml   Output 1125 ml   Net -223.75 ml        Exam:     Physical Exam:    Gen:  Well-developed, well-nourished, in no acute distress  HEENT:  Pink conjunctivae, PERRL, hearing intact to voice  Resp:  No accessory muscle use, clear breath sounds without wheezes rales or rhonchi  Card:  RRR, No murmurs, normal S1, S2, no peripheral edema  Abd:  Soft, non-tender, non-distended, surgical site with dressing c/d/i, serosanguinous fluid in CHELA drain    Musc:  No cyanosis or clubbing  Skin:  surgical site with dressing c/d/i   Neuro:  Cranial nerves 3-12 are grossly intact, strength 5/5 bilaterally UEs and LEs, follows commands appropriately  Psych:  Oriented to person, place, and time, Alert with good insight      Medications Reviewed: (see below)    Lab Data Reviewed: (see below)    ______________________________________________________________________    Medications:     Current Facility-Administered Medications   Medication Dose Route Frequency    apixaban (ELIQUIS) tablet 5 mg  5 mg Oral BID    polyethylene glycol (MIRALAX) packet 17 g  17 g Oral DAILY    hydroCHLOROthiazide (HYDRODIURIL) tablet 12.5 mg  12.5 mg Oral DAILY    sodium chloride (NS) flush 5-10 mL  5-10 mL IntraVENous PRN    sodium chloride (NS) flush 5-40 mL  5-40 mL IntraVENous Q8H    sodium chloride (NS) flush 5-40 mL  5-40 mL IntraVENous PRN    ondansetron (ZOFRAN) injection 4 mg  4 mg IntraVENous Q4H PRN    naloxone (NARCAN) injection 0.4 mg  0.4 mg IntraVENous PRN    acetaminophen (TYLENOL) tablet 650 mg  650 mg Oral Q6H PRN    HYDROcodone-acetaminophen (NORCO) 5-325 mg per tablet 1 Tab  1 Tab Oral Q4H PRN    HYDROmorphone (DILAUDID) syringe 1 mg  1 mg IntraVENous Q3H PRN    levoFLOXacin (LEVAQUIN) 500 mg in D5W IVPB  500 mg IntraVENous Q24H    metroNIDAZOLE (FLAGYL) IVPB premix 500 mg  500 mg IntraVENous Q12H    rosuvastatin (CRESTOR) tablet 10 mg  10 mg Oral QHS    losartan (COZAAR) tablet 50 mg  50 mg Oral DAILY    hydrALAZINE (APRESOLINE) 20 mg/mL injection 10 mg  10 mg IntraVENous Q6H PRN    prochlorperazine (COMPAZINE) injection 5 mg  5 mg IntraVENous Q6H PRN          Lab Review:     Recent Labs     10/04/20  0455 10/03/20  0337 10/02/20  0714   WBC 10.5 12.4* 15.8*   HGB 14.2 14.5 15.9   HCT 41.5 43.1 46.7    121* 127*     Recent Labs     10/04/20  0455 10/03/20  0337 10/02/20  0345    138 134*   K 3.5 4.0 3.5   * 107 103   CO2 25 25 25   * 114* 96   BUN 28* 25* 19   CREA 1.08 1.13 1.28   CA 7.7* 7.5* 8.1*   MG 2.3 2.0 1.8   ALB 2.2* 2.4* 2.9*   TBILI 0.6 1.0 1.9*   ALT 32 39 15     Lab Results   Component Value Date/Time    Glucose (POC) 101 (H) 10/02/2020 02:19 AM    Glucose (POC) 185 (H) 07/03/2019 07:17 PM          Assessment / Plan:     Sepsis due to acute gangrenous calculous cholecystitis: improving, leukocytosis trending down. blood and urine cultures negative to date. Continue Levaquin and flagyl. S/p post day 2 cholecystectomy for acute gangrenous calculous cholecystitis: No further IVf as diet has been resumed. Continue pain management with IV dilaudid. CHELA management per surgery. HTN: resume losartan and HCTZ. 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 Left bundle-branch block: no acute concerns. Benign prostatic hypertrophy: no acute issues. Bladder scan PRN.        Total time spent with patient: 28 Minutes **I personally saw and examined the patient during this time period**                 Care Plan discussed with: Patient and Family    Discussed:  Care Plan    Prophylaxis:  SCD's    Disposition:  Home w/Family           ___________________________________________________    Attending Physician: Ruslan Tee DO

## 2020-10-04 NOTE — PROGRESS NOTES
Problem: Mobility Impaired (Adult and Pediatric)  Goal: *Acute Goals and Plan of Care (Insert Text)  Description: FUNCTIONAL STATUS PRIOR TO ADMISSION: Patient was independent and active without use of DME. He reports balance deficits x 1.5 years, sudden onset and most notable in low light situations. HOME SUPPORT PRIOR TO ADMISSION: The patient lived with family but did not require assist.    Physical Therapy Goals  Initiated 10/3/2020  1. Patient will move from supine to sit and sit to supine  in bed with supervision/set-up within 7 day(s). 2.  Patient will transfer from bed to chair and chair to bed with supervision/set-up using the least restrictive device within 7 day(s). 3.  Patient will perform sit to stand with supervision/set-up within 7 day(s). 4.  Patient will ambulate with supervision/set-up for 150 feet with the least restrictive device within 7 day(s). 5.  Patient will ascend/descend 15 stairs with one handrail(s) with supervision/set-up within 7 day(s). Outcome: Progressing Towards Goal   PHYSICAL THERAPY TREATMENT  Patient: Alexandria Garza (51 y.o. male)  Date: 10/4/2020  Diagnosis: Acute calculous cholecystitis [K80.00]  Acute calculous cholecystitis [K80.00]   Acute calculous cholecystitis  Procedure(s) (LRB):  ROBOTIC ASSISTED CHOLECYSTECTOMY WITH FIREFLY (N/A) 2 Days Post-Op  Precautions:    Chart, physical therapy assessment, plan of care and goals were reviewed. ASSESSMENT  Patient continues with skilled PT services and is progressing towards goals. Communicated with nurse cleared for therapy. Spouse in the room with the patient and was ambulating out on the hallway earlier. Explained to patient MD want him to ambulate again. Sit to stand modified independent, ambulate without assistive device out on the 5th floor hallway modified independent.  Assisted back to the recliner after ambulation OOB to chair as tolerated performed some active range of motion exercise on both LE all planes. Educate and  Instructed patient to continue active range of motion exercise on both legs while up on chair or on bed. Communicated with nurse karina link to monitor patient. Current Level of Function Impacting Discharge (mobility/balance): modified independent without assistive device. Other factors to consider for discharge: none         PLAN :  Patient continues to benefit from skilled intervention to address the above impairments. Continue treatment per established plan of care. to address goals. Recommendation for discharge: (in order for the patient to meet his/her long term goals)  Physical therapy at least 2 days/week in the home     This discharge recommendation:  Has been made in collaboration with the attending provider and/or case management    IF patient discharges home will need the following DME: none       SUBJECTIVE:   Patient stated I just walk with my wife a while ago. Jennifer [facility]    OBJECTIVE DATA SUMMARY:   Critical Behavior:  Neurologic State: Alert, Eyes open spontaneously  Orientation Level: Oriented X4  Cognition: Follows commands     Functional Mobility Training:  Bed Mobility:  Rolling: (already up on the chair)                 Transfers:  Sit to Stand: Modified independent  Stand to Sit: Modified independent  Stand Pivot Transfers: Modified independent     Bed to Chair: Modified independent                    Balance:  Sitting: Intact  Sitting - Static: Good (unsupported)  Sitting - Dynamic: Good (unsupported)  Standing: Intact; Without support  Standing - Static: Good  Standing - Dynamic : Fair  Ambulation/Gait Training:  Distance (ft): 200 Feet (ft)  Assistive Device: Walker, rolling;Gait belt  Ambulation - Level of Assistance: Modified independent     Gait Description (WDL): Exceptions to WDL  Gait Abnormalities: Path deviations        Base of Support: Widened     Speed/Ursula: Pace decreased (<100 feet/min)  Step Length: Right shortened;Left shortened Therapeutic Exercises:    Instructed patient to continue active range of motion exercise on both legs while up on chair or on bed. Pain Ratin/10    Activity Tolerance:   Good      After treatment patient left in no apparent distress:   Sitting in chair, Heels elevated for pressure relief, Call bell within reach, and Caregiver / family present    COMMUNICATION/COLLABORATION:   The patients plan of care was discussed with: Registered nurse. Brooklyn Omer PT,WCC.    Time Calculation: 30 mins

## 2020-10-04 NOTE — PROGRESS NOTES
Progress Note    Patient: Lord Salinas MRN: [id number]  SSN: [id number]    YOB: 1935  Age: 82 y.o. Sex: male      Admit Date: 2020    2 Days Post-Op    Procedure:  Procedure(s):  ROBOTIC ASSISTED CHOLECYSTECTOMY WITH FIREFLY    Subjective:     Mr. Leilani Franco is doing fine. He tolerated his diet. He feels weak. He was in the chair yesterday with PT, but did not walk in the slater. Objective:     Visit Vitals  BP (!) 152/82 (BP 1 Location: Left arm, BP Patient Position: At rest)   Pulse 65   Temp 98.2 °F (36.8 °C)   Resp 17   Ht 6' (1.829 m)   Wt 234 lb (106.1 kg)   SpO2 93%   BMI 31.74 kg/m²       Temp (24hrs), Av.1 °F (36.7 °C), Min:97.7 °F (36.5 °C), Max:98.3 °F (36.8 °C)      Physical Exam:    GENERAL: alert, cooperative, no distress, EYE: negative findings: anicteric sclera, ABDOMEN: Soft, NT, distended and tympanitic. The incisions are intact with serosanguinous drainage, but no erythema. The CHELA fluid is serosanguinous.     Lab Review:   BMP:   Lab Results   Component Value Date/Time     10/04/2020 04:55 AM    K 3.5 10/04/2020 04:55 AM     (H) 10/04/2020 04:55 AM    CO2 25 10/04/2020 04:55 AM    AGAP 4 (L) 10/04/2020 04:55 AM     (H) 10/04/2020 04:55 AM    BUN 28 (H) 10/04/2020 04:55 AM    CREA 1.08 10/04/2020 04:55 AM    GFRAA >60 10/04/2020 04:55 AM    GFRNA >60 10/04/2020 04:55 AM     CBC:   Lab Results   Component Value Date/Time    WBC 10.5 10/04/2020 04:55 AM    HGB 14.2 10/04/2020 04:55 AM    HCT 41.5 10/04/2020 04:55 AM     10/04/2020 04:55 AM     Liver Panel:   Lab Results   Component Value Date/Time    ALB 2.2 (L) 10/04/2020 04:55 AM    CBIL 0.2 10/04/2020 04:55 AM    TP 5.4 (L) 10/04/2020 04:55 AM    GLOB 3.2 10/04/2020 04:55 AM    AGRAT 0.7 (L) 10/04/2020 04:55 AM    ALT 32 10/04/2020 04:55 AM    AP 61 10/04/2020 04:55 AM       Assessment:     Hospital Problems  Date Reviewed: 2020          Codes Class Noted POA    S/P laparoscopic cholecystectomy ICD-10-CM: Z90.49  ICD-9-CM: V45.89  10/3/2020 No    Overview Signed 10/3/2020 11:05 AM by Milton Sampson MD     Robotic-assisted with firefly. * (Principal) Acute calculous cholecystitis ICD-10-CM: K80.00  ICD-9-CM: 574.00  9/30/2020 Yes              Plan/Recommendations/Medical Decision Making:     Hb stable. Resume Eliquis. WBC and LFT's normal.  Continue IV antibiotics. D/C CHELA prior to discharge. IS.  Must get OOB and ambulate in slater!!! Possible D/C tomorrow if able to ambulate.

## 2020-10-05 VITALS
RESPIRATION RATE: 18 BRPM | BODY MASS INDEX: 31.69 KG/M2 | HEART RATE: 65 BPM | HEIGHT: 72 IN | TEMPERATURE: 97.3 F | WEIGHT: 234 LBS | OXYGEN SATURATION: 95 % | SYSTOLIC BLOOD PRESSURE: 111 MMHG | DIASTOLIC BLOOD PRESSURE: 62 MMHG

## 2020-10-05 LAB
ALBUMIN SERPL-MCNC: 2.3 G/DL (ref 3.5–5)
ALBUMIN/GLOB SERPL: 0.8 {RATIO} (ref 1.1–2.2)
ALP SERPL-CCNC: 59 U/L (ref 45–117)
ALT SERPL-CCNC: 31 U/L (ref 12–78)
ANION GAP SERPL CALC-SCNC: 2 MMOL/L (ref 5–15)
AST SERPL-CCNC: 28 U/L (ref 15–37)
BASOPHILS # BLD: 0 K/UL (ref 0–0.1)
BASOPHILS NFR BLD: 0 % (ref 0–1)
BILIRUB DIRECT SERPL-MCNC: 0.2 MG/DL (ref 0–0.2)
BILIRUB SERPL-MCNC: 0.6 MG/DL (ref 0.2–1)
BUN SERPL-MCNC: 26 MG/DL (ref 6–20)
BUN/CREAT SERPL: 25 (ref 12–20)
CALCIUM SERPL-MCNC: 7.8 MG/DL (ref 8.5–10.1)
CHLORIDE SERPL-SCNC: 109 MMOL/L (ref 97–108)
CO2 SERPL-SCNC: 29 MMOL/L (ref 21–32)
CREAT SERPL-MCNC: 1.05 MG/DL (ref 0.7–1.3)
DIFFERENTIAL METHOD BLD: ABNORMAL
EOSINOPHIL # BLD: 0.1 K/UL (ref 0–0.4)
EOSINOPHIL NFR BLD: 1 % (ref 0–7)
ERYTHROCYTE [DISTWIDTH] IN BLOOD BY AUTOMATED COUNT: 13.2 % (ref 11.5–14.5)
GLOBULIN SER CALC-MCNC: 3 G/DL (ref 2–4)
GLUCOSE SERPL-MCNC: 101 MG/DL (ref 65–100)
HCT VFR BLD AUTO: 40.7 % (ref 36.6–50.3)
HGB BLD-MCNC: 14.2 G/DL (ref 12.1–17)
IMM GRANULOCYTES # BLD AUTO: 0.1 K/UL (ref 0–0.04)
IMM GRANULOCYTES NFR BLD AUTO: 1 % (ref 0–0.5)
LYMPHOCYTES # BLD: 1.7 K/UL (ref 0.8–3.5)
LYMPHOCYTES NFR BLD: 22 % (ref 12–49)
MAGNESIUM SERPL-MCNC: 2.1 MG/DL (ref 1.6–2.4)
MCH RBC QN AUTO: 32.9 PG (ref 26–34)
MCHC RBC AUTO-ENTMCNC: 34.9 G/DL (ref 30–36.5)
MCV RBC AUTO: 94.2 FL (ref 80–99)
MONOCYTES # BLD: 0.7 K/UL (ref 0–1)
MONOCYTES NFR BLD: 9 % (ref 5–13)
NEUTS SEG # BLD: 5 K/UL (ref 1.8–8)
NEUTS SEG NFR BLD: 67 % (ref 32–75)
NRBC # BLD: 0 K/UL (ref 0–0.01)
NRBC BLD-RTO: 0 PER 100 WBC
PLATELET # BLD AUTO: 156 K/UL (ref 150–400)
PMV BLD AUTO: 9.2 FL (ref 8.9–12.9)
POTASSIUM SERPL-SCNC: 3.3 MMOL/L (ref 3.5–5.1)
PROT SERPL-MCNC: 5.3 G/DL (ref 6.4–8.2)
RBC # BLD AUTO: 4.32 M/UL (ref 4.1–5.7)
SODIUM SERPL-SCNC: 140 MMOL/L (ref 136–145)
WBC # BLD AUTO: 7.5 K/UL (ref 4.1–11.1)

## 2020-10-05 PROCEDURE — 80048 BASIC METABOLIC PNL TOTAL CA: CPT

## 2020-10-05 PROCEDURE — 74011250637 HC RX REV CODE- 250/637: Performed by: SURGERY

## 2020-10-05 PROCEDURE — 85025 COMPLETE CBC W/AUTO DIFF WBC: CPT

## 2020-10-05 PROCEDURE — 80076 HEPATIC FUNCTION PANEL: CPT

## 2020-10-05 PROCEDURE — 74011250636 HC RX REV CODE- 250/636: Performed by: SURGERY

## 2020-10-05 PROCEDURE — 83735 ASSAY OF MAGNESIUM: CPT

## 2020-10-05 PROCEDURE — 36415 COLL VENOUS BLD VENIPUNCTURE: CPT

## 2020-10-05 PROCEDURE — 94760 N-INVAS EAR/PLS OXIMETRY 1: CPT

## 2020-10-05 PROCEDURE — 99024 POSTOP FOLLOW-UP VISIT: CPT | Performed by: SURGERY

## 2020-10-05 RX ORDER — METRONIDAZOLE 500 MG/1
500 TABLET ORAL 3 TIMES DAILY
Qty: 12 TAB | Refills: 0 | Status: SHIPPED | OUTPATIENT
Start: 2020-10-05 | End: 2020-10-09

## 2020-10-05 RX ORDER — LEVOFLOXACIN 500 MG/1
500 TABLET, FILM COATED ORAL DAILY
Qty: 4 TAB | Refills: 0 | Status: SHIPPED | OUTPATIENT
Start: 2020-10-05 | End: 2020-10-09

## 2020-10-05 RX ORDER — HYDROCODONE BITARTRATE AND ACETAMINOPHEN 5; 325 MG/1; MG/1
1 TABLET ORAL
Qty: 6 TAB | Refills: 0 | Status: SHIPPED | OUTPATIENT
Start: 2020-10-05 | End: 2020-10-08

## 2020-10-05 RX ADMIN — LEVOFLOXACIN 500 MG: 5 INJECTION, SOLUTION INTRAVENOUS at 08:38

## 2020-10-05 RX ADMIN — APIXABAN 5 MG: 5 TABLET, FILM COATED ORAL at 10:03

## 2020-10-05 RX ADMIN — METRONIDAZOLE 500 MG: 500 INJECTION, SOLUTION INTRAVENOUS at 08:38

## 2020-10-05 RX ADMIN — HYDROCHLOROTHIAZIDE 12.5 MG: 25 TABLET ORAL at 08:37

## 2020-10-05 RX ADMIN — LOSARTAN POTASSIUM 50 MG: 50 TABLET, FILM COATED ORAL at 08:37

## 2020-10-05 RX ADMIN — Medication 10 ML: at 05:02

## 2020-10-05 NOTE — PROGRESS NOTES
Progress Note    Patient: Arlen Gracia MRN: [id number]  SSN: [id number]    YOB: 1935  Age: 82 y.o. Sex: male      Admit Date: 2020    3 Days Post-Op    Procedure:  Procedure(s):  ROBOTIC ASSISTED CHOLECYSTECTOMY WITH FIREFLY    Subjective:     Mr. Vijay Quiñones feels much better. He tolerated his diet. He is passing flatus and had a large BM. Objective:     Visit Vitals  /62 (BP 1 Location: Left arm, BP Patient Position: At rest)   Pulse 65   Temp 97.3 °F (36.3 °C)   Resp 18   Ht 6' (1.829 m)   Wt 234 lb (106.1 kg)   SpO2 95%   BMI 31.74 kg/m²       Temp (24hrs), Av.6 °F (36.4 °C), Min:97.3 °F (36.3 °C), Max:98 °F (36.7 °C)      Physical Exam:    GENERAL: alert, cooperative, no distress, EYE: negative findings: anicteric sclera, ABDOMEN: Soft, NT, ND. The incisions are clean, dry, and intact with no erythema. The CHELA fluid is serosanguinous. Data Review: reviewed  cultures: negative.     Lab Review:   BMP:   Lab Results   Component Value Date/Time     10/05/2020 02:59 AM    K 3.3 (L) 10/05/2020 02:59 AM     (H) 10/05/2020 02:59 AM    CO2 29 10/05/2020 02:59 AM    AGAP 2 (L) 10/05/2020 02:59 AM     (H) 10/05/2020 02:59 AM    BUN 26 (H) 10/05/2020 02:59 AM    CREA 1.05 10/05/2020 02:59 AM    GFRAA >60 10/05/2020 02:59 AM    GFRNA >60 10/05/2020 02:59 AM     CBC:   Lab Results   Component Value Date/Time    WBC 7.5 10/05/2020 02:59 AM    HGB 14.2 10/05/2020 02:59 AM    HCT 40.7 10/05/2020 02:59 AM     10/05/2020 02:59 AM     Liver Panel:   Lab Results   Component Value Date/Time    ALB 2.3 (L) 10/05/2020 02:59 AM    CBIL 0.2 10/05/2020 02:59 AM    TP 5.3 (L) 10/05/2020 02:59 AM    GLOB 3.0 10/05/2020 02:59 AM    AGRAT 0.8 (L) 10/05/2020 02:59 AM    ALT 31 10/05/2020 02:59 AM    AP 59 10/05/2020 02:59 AM       Assessment:     Hospital Problems  Date Reviewed: 2020          Codes Class Noted POA    S/P laparoscopic cholecystectomy ICD-10-CM: Z90.49  ICD-9-CM: V45.89  10/3/2020 No    Overview Signed 10/3/2020 11:05 AM by Dorota Pritchard MD     Robotic-assisted with firefly. * (Principal) Acute calculous cholecystitis ICD-10-CM: K80.00  ICD-9-CM: 574.00  9/30/2020 Yes              Plan/Recommendations/Medical Decision Making:     Drain d/c'd.  D/C home with home PT to complete full week of antibiotics.

## 2020-10-05 NOTE — DISCHARGE SUMMARY
Physician Discharge Summary     Patient ID:  John Joshua  [id number]  89 y.o.  1935    Allergies: Pcn [penicillins]    Admit Date: 9/30/2020    Discharge Date: 10/5/2020    * Admission Diagnoses: Acute calculous cholecystitis [K80.00]; Acute calculous cholecystitis [K80.00]    * Discharge Diagnoses:    Hospital Problems as of 10/5/2020 Date Reviewed: 8/6/2020          Codes Class Noted - Resolved POA    S/P laparoscopic cholecystectomy ICD-10-CM: Z90.49  ICD-9-CM: V45.89  10/3/2020 - Present No    Overview Signed 10/3/2020 11:05 AM by Chinyere Maldonado MD     Robotic-assisted with firefly. * (Principal) Acute calculous cholecystitis ICD-10-CM: K80.00  ICD-9-CM: 574.00  9/30/2020 - Present Yes               Admission Condition: Poor    * Discharge Condition: good and improved    * Procedures: Procedure(s):  ROBOTIC ASSISTED CHOLECYSTECTOMY WITH FIREFLY    * Hospital Course:   Normal hospital course for this procedure. Consults: Cardiology and Hospitalist, PT. Significant Diagnostic Studies: radiology: CT scan: abdomen and pelvis, and US of abdomen. * Disposition: Home with Physical Therapy. Discharge Medications:   Current Discharge Medication List      START taking these medications    Details   HYDROcodone-acetaminophen (NORCO) 5-325 mg per tablet Take 1 Tab by mouth every four (4) hours as needed for Pain for up to 3 days. Max Daily Amount: 6 Tabs. Qty: 6 Tab, Refills: 0    Associated Diagnoses: S/P laparoscopic cholecystectomy      levoFLOXacin (LEVAQUIN) 500 mg tablet Take 1 Tab by mouth daily for 4 days. Qty: 4 Tab, Refills: 0      metroNIDAZOLE (FLAGYL) 500 mg tablet Take 1 Tab by mouth three (3) times daily for 4 days. Qty: 12 Tab, Refills: 0         CONTINUE these medications which have NOT CHANGED    Details   losartan-hydroCHLOROthiazide (HYZAAR) 50-12.5 mg per tablet Take 1 Tab by mouth daily. rosuvastatin (CRESTOR) 10 mg tablet Take 10 mg by mouth nightly. co-enzyme Q-10 (CO Q-10) 100 mg capsule Take 100 mg by mouth daily. apixaban (ELIQUIS) 5 mg tablet Take 1 Tab by mouth two (2) times a day. Indications: CVA, ?PAF, recommended by cardiology and neurology  Qty: 60 Tab, Refills: 0      ascorbic acid, vitamin C, (VITAMIN C) 500 mg tablet Take 500 mg by mouth daily. * Follow-up Care/Patient Instructions: Activity: Activity as tolerated. Diet: Regular Diet. Wound Care: Keep wounds clean and dry. Follow-up Information     Follow up With Specialties Details Why Contact Info    Damaris Reed MD General Practice Go on 10/9/2020 Hospital follow up appt has been scheduled for Oct 9 @ 10:00AM [address] [place] [address]/ [address]  [phone]          Follow-up tests/labs: None.     Signed:  Martinez Still MD  10/5/2020  10:26 AM

## 2020-10-05 NOTE — PROGRESS NOTES
Problem: Falls - Risk of  Goal: *Absence of Falls  Description: Document Kelly Villareal Fall Risk and appropriate interventions in the flowsheet.   Outcome: Progressing Towards Goal  Note: Fall Risk Interventions:  Mobility Interventions: Communicate number of staff needed for ambulation/transfer         Medication Interventions: Patient to call before getting OOB    Elimination Interventions: Call light in reach, Patient to call for help with toileting needs              Problem: Pain  Goal: *Control of Pain  Outcome: Progressing Towards Goal

## 2020-10-05 NOTE — PROGRESS NOTES
Discharge paperwork prepared. James Nasir, [address] setting home [facility] per order. Patient and spouse awaiting son for transportation home. 1145: Patient's son arrived. PIV x2 complete. Patient given discharge instructions. Discharge medications reviewed with patient and appropriate educational materials and side effects teaching were provided. I have reviewed discharge instructions with the patient. The patient verbalized understanding. Leave smart education provided to patient. The patient verbalized understanding. AVS signed and given to patient.

## 2020-10-05 NOTE — PROGRESS NOTES
Discharge information provided to patient. IV's removed. CHELA drain removed. Time for questions and concerns allowed. Patient son will transport home. Telemetry removed. Prescriptions sent to pharmacy. RN will continue to monitor. Discharge medications reviewed with patient and spouse and appropriate educational materials and side effects teaching were provided. I have reviewed discharge instructions with the patient and parent. The patient and spouse verbalized understanding. Leave smart education provided to patient and spouse. The patient and spouse verbalized understanding. AVS signed and given to patient.

## 2020-10-05 NOTE — PROGRESS NOTES
Transition Plan of Care  RUR 12%      12:14 PM  [facility] has accepted patient. Family aware. ALEN Tan        Plan    Patient is ready for discharge. Referral sent to [facility] for home health services RN/OT/PT. Await for response. Patient signed Freedom of Choice letter and 2nd IM Letter. Patient received copies and copy placed on chart. Son will transport home.     [facility] updated on AVS.    Care Management Interventions  PCP Verified by CM: Yes(dr. Go Duffy)  Mode of Transport at Discharge: Self  Transition of Care Consult (CM Consult): [address]: No  Reason Outside [place]: Patient already serviced by other home [address]: Lives with Spouse  Confirm Follow Up Transport: Family  The Plan for Transition of Care is Related to the Following Treatment Goals : ome with PT/OT/RN  The Patient and/or Patient Representative was Provided with a Choice of Provider and Agrees with the Discharge Plan?: (S) Yes  Name of the Patient Representative Who was Provided with a Choice of Provider and Agrees with the Discharge Plan: (S) patient  Freedom of Choice List was Provided with Basic Dialogue that Supports the Patient's Individualized Plan of Care/Goals, Treatment Preferences and Shares the Quality Data Associated with the Providers?: Yes  Discharge Location  Discharge Placement: Home with home health  ALEN Tan

## 2020-10-05 NOTE — DISCHARGE INSTRUCTIONS
Patient Education        Gallbladder Removal Surgery: What to Expect at Home  Your Recovery  After your surgery, you will likely feel weak and tired for several days after you return home. Your belly may be swollen. You had laparoscopic surgery, so you may also have pain in your shoulder for about 24 hours. You may have gas or need to burp a lot at first.  A few people get diarrhea. The diarrhea usually goes away in 2 to 4 weeks, but it may last longer. How quickly you recover depends on whether you had a laparoscopic or open surgery. · For a laparoscopic surgery, most people can go back to work or their normal routine in 1 to 2 weeks, but it may take longer, depending on the type of work you do. This care sheet gives you a general idea about how long it will take for you to recover; however, each person recovers at a different pace. Follow the steps below to get better as quickly as possible. How can you care for yourself at home? Activity    · Rest when you feel tired. Getting enough sleep will help you recover.     · Try to walk each day. Start out by walking a little more than you did the day before. Gradually increase the amount you walk. Walking boosts blood flow and helps prevent pneumonia and constipation.     · For about 2 weeks, avoid lifting anything that would make you strain. This may include a child, heavy grocery bags and milk containers, a heavy briefcase or backpack, cat litter or dog food bags, or a vacuum .     · Avoid strenuous activities, such as biking, jogging, weightlifting, and aerobic exercise, until your doctor says it is okay.     · You may shower 24 hours after surgery. Pat the cuts (incisions) dry. Do not take a bath for the first 2 weeks, and until after seen by your doctor.     · You may drive after 1 week and when you are no longer taking narcotic pain medicine and can quickly move your foot from the gas pedal to the brake!   You must also be able to sit comfortably for a long period of time, even if you do not plan to go far because you might get caught in traffic.     · For a laparoscopic surgery, most people can go back to work or their normal routine in 1 to 2 weeks, but it may take longer.       ·    Diet    · Eat smaller meals more often instead of fewer larger meals. You can eat a normal diet. If your stomach is upset, try bland, low-fat foods like plain rice, broiled chicken, toast, and yogurt, and avoid eating fatty foods for about 1 month. Fatty foods include hamburger, whole milk, cheese, and many snack foods.      · Drink plenty of fluids (unless your doctor tells you not to).   · If you have diarrhea, try avoiding spicy foods, dairy products, fatty foods, and alcohol. You can also watch to see if specific foods cause it, and stop eating them. If the diarrhea continues for more than 2 weeks, talk to your doctor.     · You may notice that your bowel movements are not regular right after your surgery. This is common. Try to avoid constipation and straining with bowel movements. You may want to take a fiber supplement every day. If you have not had a bowel movement by Saturday, 10/3, then take Miralax, Milk of Magnesia, prune juice, or other laxative until your bowel movements are normal!   Medicines    · You can restart your medicines. Your doctor will also give you instructions about taking any new medicines.     · YOU MAY CONTINUE ELIQUIS!     · Take pain medicines exactly as directed. ? If the doctor gave you a prescription medicine for pain, take it as prescribed. ? If you are not taking a prescription pain medicine, take an over-the-counter medicine such as acetaminophen (Tylenol), ibuprofen (Advil, Motrin), or naproxen (Aleve). Read and follow all instructions on the label. ? Do not take two or more pain medicines at the same time unless the doctor told you to. Many pain medicines contain acetaminophen, which is Tylenol.   Too much Tylenol can be harmful.     · If you think your pain medicine is making you sick to your stomach:  ? Take your medicine after meals (unless your doctor tells you not to). ? Ask your doctor for a different pain medicine.     · If your doctor prescribed antibiotics, take them as directed. Do not stop taking them just because you feel better. You need to take the full course of antibiotics. Start Levaquin and Flagyl on Tuesday, 10/6 and end on Friday, 10/9! Incision care    · Remove your bandages on Ped[place], 10/4. You may leave your incisions uncovered. · If you have strips of tape on the incisions, or cuts, leave the tape on for a week and then remove them on Friday, 10/9!     · After 24 to 48 hours, wash the area daily with warm, soapy water, and pat it dry.     ·      · Keep the incisions clean and dry. You may cover them with a gauze bandage if they weep or rub against clothing. Change the bandage every day. Ice    · To reduce swelling and pain, put ice or a cold pack on your belly for 10 to 20 minutes at a time. Do this every 1 to 2 hours. Put a thin cloth between the ice and your skin. Follow-up care is a key part of your treatment and safety. Be sure to make and go to all appointments, and call your doctor if you are having problems. It's also a good idea to know your test results and keep a list of the medicines you take. When should you call for help? Call 911 anytime you think you may need emergency care. For example, call if:    · You passed out (lost consciousness).     · You are short of breath. .   Call your doctor now or seek immediate medical care if:    · You are sick to your stomach and cannot drink fluids.     · You have abdominal pain that does not get better when you take your pain medicine. · Your eyes turn jaundice (yellow).     · You cannot pass stool or gas.     · You have signs of infection, such as:  ? Increased pain, swelling, warmth, or redness.   ? Red streaks leading from the incision. ? Pus draining from the incision. ? A fever > 101.     · Bright red blood has soaked through the bandage over your incision.     · You have loose stitches, or your incision comes open.     · You have signs of a blood clot in your leg (called a deep vein thrombosis), such as:  ? Pain in your calf, back of knee, thigh, or groin. ? Redness and swelling in your leg or groin. Watch closely for any changes in your health, and be sure to contact your doctor if you have any problems. Where can you learn more? Go to http://www.gray.com/  Enter F357 in the search box to learn more about \"Gallbladder Removal Surgery: What to Expect at Home. \"  Current as of: April 15, 2020               Content Version: 12.6  © 2868-5710 [employer]. Care instructions adapted under license by [employer] (which disclaims liability or warranty for this information). If you have questions about a medical condition or this instruction, always ask your healthcare professional. [place] [address] any warranty or liability for your use of this information. Rafy Ayers. Kari Canales MD, [facility] at [facility], [address] [facility], [facility], [address]  [phone]  Fax [phone]    [facility]/ED Visit Follow-Up Instructions/Information    You may have an in home follow up visit set [facility] with [facility] or may wish to contact [facility] to set-up a visit:    What are we? [facility] is an in-home urgent care service staffed with emergency trained medical teams. We come to your home in a vehicle stocked with medical supplies and technology. An ER physician is always available if needed. When? As a part of your hospital follow-up, an appointment has been/ or can be set up for us to come see you.  Usually, this will be 24-72 hours after you leave the hospital or as needed. [facility] is open 7am-9pm, 7 days a week, 365 days a year, including holidays. Why? We know that you cannot always get to your doctor after being in the hospital and that your doctor is not always available when you need them. Once your workup is complete, we'll call in your prescriptions, update your family doctor, and handle billing with your insurance so you can focus on feeling better, faster without leaving home. How much? We accept most major health insurance plans, including Medicaid, Medicare, and Medicare Advantage [address] [place] [address], [employer], and [employer]. We also accept: credit, debit, health savings account (HSA), health reimbursement account (HRA) and flexible spending account (FSA) payments. [facility]'s prices compare to conventional urgent care facilities, but we bring the care to you. How to reach us? Getting care is easy- use our mobile benjy ([employer]), website ([email or web address]) or call us [phone].

## 2020-10-05 NOTE — PROGRESS NOTES
Bedside and Verbal shift change report given to Claude Ruiz (oncoming nurse) by Abad Avila (offgoing nurse). Report included the following information SBAR, Intake/Output, MAR and Recent Results.

## 2020-10-06 ENCOUNTER — TELEPHONE (OUTPATIENT)
Dept: SURGERY | Age: 85
End: 2020-10-06

## 2020-10-06 ENCOUNTER — HOSPITAL ENCOUNTER (EMERGENCY)
Age: 85
Discharge: HOME | End: 2020-10-06
Attending: EMERGENCY MEDICINE | Admitting: EMERGENCY MEDICINE
Payer: MEDICARE

## 2020-10-06 VITALS
WEIGHT: 234 LBS | OXYGEN SATURATION: 96 % | RESPIRATION RATE: 16 BRPM | HEIGHT: 72 IN | BODY MASS INDEX: 31.69 KG/M2 | SYSTOLIC BLOOD PRESSURE: 139 MMHG | TEMPERATURE: 97.8 F | DIASTOLIC BLOOD PRESSURE: 80 MMHG | HEART RATE: 111 BPM

## 2020-10-06 DIAGNOSIS — R33.9 URINARY RETENTION: Primary | ICD-10-CM

## 2020-10-06 LAB
ALBUMIN SERPL-MCNC: 2.7 G/DL (ref 3.5–5)
ALBUMIN/GLOB SERPL: 0.8 {RATIO} (ref 1.1–2.2)
ALP SERPL-CCNC: 71 U/L (ref 45–117)
ALT SERPL-CCNC: 35 U/L (ref 12–78)
ANION GAP SERPL CALC-SCNC: 8 MMOL/L (ref 5–15)
APPEARANCE UR: CLEAR
AST SERPL-CCNC: 37 U/L (ref 15–37)
BACTERIA URNS QL MICRO: NEGATIVE /HPF
BASOPHILS # BLD: 0.1 K/UL (ref 0–0.1)
BASOPHILS NFR BLD: 1 % (ref 0–1)
BILIRUB SERPL-MCNC: 0.9 MG/DL (ref 0.2–1)
BILIRUB UR QL: NEGATIVE
BUN SERPL-MCNC: 22 MG/DL (ref 6–20)
BUN/CREAT SERPL: 18 (ref 12–20)
CALCIUM SERPL-MCNC: 8.5 MG/DL (ref 8.5–10.1)
CHLORIDE SERPL-SCNC: 107 MMOL/L (ref 97–108)
CO2 SERPL-SCNC: 25 MMOL/L (ref 21–32)
COLOR UR: ABNORMAL
CREAT SERPL-MCNC: 1.23 MG/DL (ref 0.7–1.3)
DIFFERENTIAL METHOD BLD: ABNORMAL
EOSINOPHIL # BLD: 0.1 K/UL (ref 0–0.4)
EOSINOPHIL NFR BLD: 1 % (ref 0–7)
EPITH CASTS URNS QL MICRO: ABNORMAL /LPF
ERYTHROCYTE [DISTWIDTH] IN BLOOD BY AUTOMATED COUNT: 13.2 % (ref 11.5–14.5)
GLOBULIN SER CALC-MCNC: 3.2 G/DL (ref 2–4)
GLUCOSE SERPL-MCNC: 108 MG/DL (ref 65–100)
GLUCOSE UR STRIP.AUTO-MCNC: NEGATIVE MG/DL
HCT VFR BLD AUTO: 45.8 % (ref 36.6–50.3)
HGB BLD-MCNC: 16.1 G/DL (ref 12.1–17)
HGB UR QL STRIP: ABNORMAL
HYALINE CASTS URNS QL MICRO: ABNORMAL /LPF (ref 0–5)
IMM GRANULOCYTES # BLD AUTO: 0.1 K/UL (ref 0–0.04)
IMM GRANULOCYTES NFR BLD AUTO: 1 % (ref 0–0.5)
KETONES UR QL STRIP.AUTO: NEGATIVE MG/DL
LEUKOCYTE ESTERASE UR QL STRIP.AUTO: NEGATIVE
LYMPHOCYTES # BLD: 1.3 K/UL (ref 0.8–3.5)
LYMPHOCYTES NFR BLD: 15 % (ref 12–49)
MCH RBC QN AUTO: 32.9 PG (ref 26–34)
MCHC RBC AUTO-ENTMCNC: 35.2 G/DL (ref 30–36.5)
MCV RBC AUTO: 93.7 FL (ref 80–99)
MONOCYTES # BLD: 1.1 K/UL (ref 0–1)
MONOCYTES NFR BLD: 12 % (ref 5–13)
NEUTS SEG # BLD: 6.3 K/UL (ref 1.8–8)
NEUTS SEG NFR BLD: 70 % (ref 32–75)
NITRITE UR QL STRIP.AUTO: NEGATIVE
NRBC # BLD: 0 K/UL (ref 0–0.01)
NRBC BLD-RTO: 0 PER 100 WBC
PH UR STRIP: 6.5 [PH] (ref 5–8)
PLATELET # BLD AUTO: 170 K/UL (ref 150–400)
PMV BLD AUTO: 9.4 FL (ref 8.9–12.9)
POTASSIUM SERPL-SCNC: 3.2 MMOL/L (ref 3.5–5.1)
PROT SERPL-MCNC: 5.9 G/DL (ref 6.4–8.2)
PROT UR STRIP-MCNC: NEGATIVE MG/DL
RBC # BLD AUTO: 4.89 M/UL (ref 4.1–5.7)
RBC #/AREA URNS HPF: ABNORMAL /HPF (ref 0–5)
SODIUM SERPL-SCNC: 140 MMOL/L (ref 136–145)
SP GR UR REFRACTOMETRY: 1.01 (ref 1–1.03)
UR CULT HOLD, URHOLD: NORMAL
UROBILINOGEN UR QL STRIP.AUTO: 1 EU/DL (ref 0.2–1)
WBC # BLD AUTO: 8.9 K/UL (ref 4.1–11.1)
WBC URNS QL MICRO: ABNORMAL /HPF (ref 0–4)

## 2020-10-06 PROCEDURE — 99284 EMERGENCY DEPT VISIT MOD MDM: CPT

## 2020-10-06 PROCEDURE — 36415 COLL VENOUS BLD VENIPUNCTURE: CPT

## 2020-10-06 PROCEDURE — 77030012865 HC BG URIN LEG MDII -A

## 2020-10-06 PROCEDURE — 81001 URINALYSIS AUTO W/SCOPE: CPT

## 2020-10-06 PROCEDURE — 85025 COMPLETE CBC W/AUTO DIFF WBC: CPT

## 2020-10-06 PROCEDURE — 77030005513 HC CATH URETH FOL11 MDII -B

## 2020-10-06 PROCEDURE — 51702 INSERT TEMP BLADDER CATH: CPT

## 2020-10-06 PROCEDURE — 77030040831 HC BAG URINE DRNG MDII -A

## 2020-10-06 PROCEDURE — 51798 US URINE CAPACITY MEASURE: CPT

## 2020-10-06 PROCEDURE — 77030034696 HC CATH URETH FOL 2W BARD -A

## 2020-10-06 PROCEDURE — 80053 COMPREHEN METABOLIC PANEL: CPT

## 2020-10-06 NOTE — TELEPHONE ENCOUNTER
Follow up call made to patient. Patient seen in [facility] for urinary retention. Patient was discharged with [facility] Cath. The Cath is to remain in place for the next few days. He is to follow up with [facility] which appointment has been scheduled. Patient shared he is feeling much better and relieved. I advised patient to call the office if he has any questions or concerns.

## 2020-10-06 NOTE — ED TRIAGE NOTES
Patient here for urinary retention. Pt has not urinated since yesterday evening. Pt was released the hospital here yesterday after getting his gallbladder removed. Hx of prostate issues.

## 2020-10-06 NOTE — ED PROVIDER NOTES
The history is provided by the patient and a relative. No  was used. Urinary Retention    This is a new problem. The current episode started yesterday. The problem occurs constantly. The problem has not changed since onset. The pain is located in the suprapubic region. The quality of the pain is dull. The pain is severe. Pertinent negatives include no anorexia, no fever, no belching, no diarrhea, no flatus, no hematochezia, no melena, no nausea, no vomiting, no constipation, no dysuria, no frequency, no hematuria, no headaches, no arthralgias, no myalgias, no trauma, no chest pain, no testicular pain and no back pain. Nothing worsens the pain. The pain is relieved by nothing. 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     Vertigo        Past Surgical History:   Procedure Laterality Date    HX TONSIL AND ADENOIDECTOMY           Family History:   Problem Relation Age of Onset    Cancer Mother     Stroke Mother     Cancer Father        Social History     Socioeconomic History    Marital status:      Spouse name: Not on file    Number of children: Not on file    Years of education: Not on file    Highest education level: Not on file   Occupational History    Not on file   Social Needs    Financial resource strain: Not on file    Food insecurity     Worry: Not on file     Inability: Not on file    Transportation needs     Medical: Not on file     Non-medical: Not on file   Tobacco Use    Smoking status: Never Smoker    Smokeless tobacco: Never Used   Substance and Sexual Activity    Alcohol use: No    Drug use: Never    Sexual activity: Not on file Lifestyle    Physical activity     Days per week: Not on file     Minutes per session: Not on file    Stress: Not on file   Relationships    Social connections     Talks on phone: Not on file     Gets together: Not on file     Attends Sabianist service: Not on file     Active member of club or organization: Not on file     Attends meetings of clubs or organizations: Not on file     Relationship status: Not on file    Intimate partner violence     Fear of current or ex partner: Not on file     Emotionally abused: Not on file     Physically abused: Not on file     Forced sexual activity: Not on file   Other Topics Concern    Not on file   Social History Narrative    Not on file         ALLERGIES: Pcn [penicillins]    Review of Systems   Constitutional: Negative for activity change, chills and fever. HENT: Negative for nosebleeds, sore throat, trouble swallowing and voice change. Eyes: Negative for visual disturbance. Respiratory: Negative for shortness of breath. Cardiovascular: Negative for chest pain and palpitations. Gastrointestinal: Positive for abdominal pain. Negative for anorexia, constipation, diarrhea, flatus, hematochezia, melena, nausea and vomiting. Genitourinary: Positive for difficulty urinating. Negative for dysuria, frequency, hematuria, testicular pain and urgency. Musculoskeletal: Negative for arthralgias, back pain, myalgias, neck pain and neck stiffness. Skin: Negative for color change. Allergic/Immunologic: Negative for immunocompromised state. Neurological: Negative for dizziness, seizures, syncope, weakness, light-headedness, numbness and headaches. Psychiatric/Behavioral: Negative for behavioral problems, confusion, hallucinations, self-injury and suicidal ideas.        Vitals:    10/06/20 0739   BP: (!) 149/93   Pulse: (!) 111   Resp: 16   Temp: 97.8 °F (36.6 °C)   SpO2: 96%   Weight: 106.1 kg (234 lb)   Height: 6' (1.829 m)            Physical Exam  Vitals signs and nursing note reviewed. Constitutional:       General: He is not in acute distress. Appearance: He is well-developed. He is not diaphoretic. HENT:      Head: Atraumatic. Neck:      Trachea: No tracheal deviation. Cardiovascular:      Comments: Warm and well perfused  Pulmonary:      Effort: Pulmonary effort is normal. No respiratory distress. Abdominal:       Musculoskeletal: Normal range of motion. Skin:     General: Skin is warm and dry. Neurological:      Mental Status: He is alert. Coordination: Coordination normal.   Psychiatric:         Behavior: Behavior normal.         Thought Content: Thought content normal.         Judgment: Judgment normal.          MDM      This is an 57-year-old male with past medical history, review of systems, physical exam as above, presenting with complaints of suprapubic pain, and decreased urinary output, in the setting of discharge from the hospital yesterday following laparoscopic cholecystectomy. Patient endorses a history of benign prostate hypertrophy, states that urinating well prior to discharge home, however urine began to drop off shortly thereafter, states she has been unable to pass urine since. Bedside ultrasound by nursing with estimated 700 cc of urine retained in the bladder. Suspect urinary retention following general anesthesia. By the next physical exam remarkable for well-appearing uncomfortable elderly male, in no acute distress, with fresh surgical scars across the abdomen. Plan to place Ward catheter, obtain CMP, CBC, UA, reevaluate, and make a disposition. Procedures    Update:    Patient output approximately 1L urine, discomfort significantly improved. UA and Cr reassuring, requesting DC home. Will DC with PCP and Urology f/u, return precautions given.

## 2020-10-06 NOTE — DISCHARGE INSTRUCTIONS
Patient Education        Urinary Retention: Care Instructions  Your Care Instructions     Urinary retention means that you aren't able to urinate. In men, it is often caused by a blockage of the urinary tract from an enlarged prostate gland. In men and women, it can also be caused by an infection or nerve damage. Or it may be a side effect of a medicine. The doctor may have drained the urine from your bladder. If you still have problems passing urine, you may need to use a catheter at home. This is used to empty your bladder until the problem can be fixed. Your doctor may put a catheter in your bladder before you go home. If so, he or she will tell you when to come back to have the catheter removed. The doctor has checked you closely. But problems can develop later. If you notice any problems or new symptoms, get medical treatment right away. Follow-up care is a key part of your treatment and safety. Be sure to make and go to all appointments, and call your doctor if you are having problems. It's also a good idea to know your test results and keep a list of the medicines you take. How can you care for yourself at home? · Take your medicines exactly as prescribed. Call your doctor if you think you are having a problem with your medicine. You will get more details on the specific medicines your doctor prescribes. · Check with your doctor before you use any over-the-counter medicines. Many cold and allergy medicines, for example, can make this problem worse. Make sure your doctor knows all of the medicines, vitamins, supplements, and herbal remedies you take. · Spread out through the day the amount of fluid you drink. Do not drink a lot at bedtime. · Avoid alcohol and caffeine. · If you have been given a catheter, or if one is already in place, follow the instructions you were given. Always wash your hands before and after you handle the catheter. When should you call for help?    Call your doctor now or seek immediate medical care if:    · You cannot urinate at all, or it is getting harder to urinate.     · You have symptoms of a urinary tract infection. These may include:  ? Pain or burning when you urinate. ? A frequent need to urinate without being able to pass much urine. ? Pain in the flank, which is just below the rib cage and above the waist on either side of the back. ? Blood in your urine. ? A fever. Watch closely for changes in your health, and be sure to contact your doctor if:    · You have any problems with your catheter.     · You do not get better as expected. Where can you learn more? Go to http://www.gray.com/  Enter M244 in the search box to learn more about \"Urinary Retention: Care Instructions. \"  Current as of: June 29, 2020               Content Version: 12.6  © 2006-2020 [employer]. Care instructions adapted under license by [employer] (which disclaims liability or warranty for this information). If you have questions about a medical condition or this instruction, always ask your healthcare professional. [place] [address] any warranty or liability for your use of this information. Patient Education        Learning About Urinary Catheter Care to Prevent Infection  What is a urinary catheter? A urinary catheter is a flexible plastic tube used to drain urine from your bladder when you can't urinate on your own. The catheter allows urine to drain from the bladder into a bag. Two types of drainage bags may be used with a urinary catheter. · A bedside bag is a large bag that you can hang on the side of your bed or on a chair. You can use it overnight or anytime you will be sitting or lying down for a long time. · A leg bag is a small bag that you can use during the day. It is usually attached to your thigh or calf and hidden under your clothes.   Having a urinary catheter increases your risk of getting a urinary tract infection. Germs may get on the catheter and cause an infection in your bladder or kidneys. The longer you have a catheter, the more likely it is that you will get an infection. You can help prevent this problem with good hygiene and careful handling of your catheter and drainage bags. How can you help prevent infection? Take care to be clean   · Always wash your hands well before and after you handle your catheter. · Clean the skin around the catheter twice a day using soap and water. Dry with a clean towel afterward. You can shower with your catheter and drainage bag in place unless your doctor told you not to. · When you clean around the catheter, check the surrounding skin for signs of infection. Look for things like pus or irritated, swollen, red, or tender skin around the catheter. Be careful with your drainage bag   · Always keep the drainage bag below the level of your bladder. This will help keep urine from flowing back into your bladder. · Check often to see that urine is flowing through the catheter into the drainage bag. · Empty the drainage bag when it is half full. This will keep it from overflowing or backing up. · When you empty the drainage bag, do not let the tubing or drain spout touch anything. · Keep the cap that comes with the tubing and cover the tip of the tubing when not in use. Be careful with your catheter   · Do not unhook the catheter from the drain tube until you are ready to change the tubing and bag. That could let germs get into the tube. · Make sure that the catheter tubing does not get twisted or kinked. · Do not tug or pull on the catheter. And make sure that the drainage bag does not drag or pull on the catheter. · Do not put powder or lotion on the skin around the catheter. · Talk with your doctor about your options for sexual intercourse while wearing a catheter. How do you empty a urine drainage bag?   If your doctor has asked you to keep a record, write down the amount of urine in the bag before you empty it. Wash your hands before and after you touch the bag. 1. Remove the drain spout from its sleeve at the bottom of the drainage bag.  2. Open the valve on the drain spout. Let the urine flow out into the toilet or a container. Be careful not to let the tubing or drain spout touch anything. 3. After you empty the bag, close the valve. Then put the drain spout back into its sleeve at the bottom of the collection bag. How do you switch to a bedside bag for overnight use? Wash your hands before and after you handle the bags. 1. Empty the leg bag that is attached to the tubing and catheter. 2. Put a clean towel under the tubing attached to the leg bag.  3. Use an alcohol wipe to clean the tip of the tubing attached to the bedside bag.  4. To stop the flow of urine, pinch the catheter with your fingers just above the tubing connection. 5. Use a twisting motion to disconnect the leg bag tubing from the catheter. 6. Then securely connect the catheter to the tubing from the bedside bag. How can you clean a bedside drainage bag? Many people clean their bedside bag in the morning if they switch to a leg bag. To clean a bedside drainage ba. Remove the bedside bag and attach the leg bag.  2. Fill the bedside bag with 2 parts vinegar and 3 parts water. Let it stand for 20 minutes. 3. Empty the bag, and let it air dry. When should you call for help? Call your doctor now or seek immediate medical care if:  · You have symptoms of a urinary infection. These may include:  ? Pain or burning when you urinate. ? A frequent need to urinate without being able to pass much urine. ? Pain in the flank, which is just below the rib cage and above the waist on either side of the back. ? Blood in your urine. ? A fever. · Your urine smells bad. · You see large blood clots in your urine. · No urine or very little urine is flowing into the bag for 4 or more hours.   Watch closely for changes in your health, and be sure to contact your doctor if:  · The area around the catheter becomes irritated, swollen, red, or tender, or there is pus draining from it. · Urine is leaking from the place where the catheter enters your body. Follow-up care is a key part of your treatment and safety. Be sure to make and go to all appointments, and call your doctor if you are having problems. It's also a good idea to know your test results and keep a list of the medicines you take. Where can you learn more? Go to http://www.gray.com/  Enter U010 in the search box to learn more about \"Learning About Urinary Catheter Care to Prevent Infection. \"  Current as of: June 29, 2020               Content Version: 12.6  © 9101-8789 [employer]. Care instructions adapted under license by [employer] (which disclaims liability or warranty for this information). If you have questions about a medical condition or this instruction, always ask your healthcare professional. [address] any warranty or liability for your use of this information.

## 2020-10-06 NOTE — ED NOTES
Michelle bag emptied. approx 1500ml clear yellow urine emptied from drain bag. Large bag exchanged for leg beg. Pt educated on michelle and michelle bag care.

## 2020-10-06 NOTE — PROGRESS NOTES
10/06/20 2:09 PM  Home health orders for PT sent to [facility] via [facility]. AVS and discharge summary also sent via [facility].   ANDERW Smith

## 2020-10-06 NOTE — TELEPHONE ENCOUNTER
Received a Hello message this morning. Patient called the answering service this morning at 6:17 AM. I went to call the patient back reviewed the chart, saw patient had gone to the [facility] for urinary retention.      Message forwarded to MD.

## 2020-10-08 LAB
BACTERIA SPEC CULT: NORMAL
BACTERIA SPEC CULT: NORMAL
SERVICE CMNT-IMP: NORMAL
SERVICE CMNT-IMP: NORMAL

## 2020-10-13 ENCOUNTER — TELEPHONE (OUTPATIENT)
Dept: SURGERY | Age: 85
End: 2020-10-13

## 2020-10-13 NOTE — TELEPHONE ENCOUNTER
Call made to patient to schedule Post Op follow up. Appointment has been scheduled for 10/16/20 at 11AM patient could not come in any earlier. He states the Catheter was removed yesterday. He is doing alright no issues.

## 2020-10-16 ENCOUNTER — OFFICE VISIT (OUTPATIENT)
Dept: SURGERY | Age: 85
End: 2020-10-16
Payer: MEDICARE

## 2020-10-16 VITALS
TEMPERATURE: 98.3 F | OXYGEN SATURATION: 95 % | DIASTOLIC BLOOD PRESSURE: 69 MMHG | HEIGHT: 72 IN | SYSTOLIC BLOOD PRESSURE: 115 MMHG | RESPIRATION RATE: 18 BRPM | WEIGHT: 199 LBS | BODY MASS INDEX: 26.95 KG/M2 | HEART RATE: 99 BPM

## 2020-10-16 DIAGNOSIS — Z90.49 S/P LAPAROSCOPIC CHOLECYSTECTOMY: Primary | ICD-10-CM

## 2020-10-16 PROBLEM — K80.00 ACUTE CALCULOUS CHOLECYSTITIS: Status: RESOLVED | Noted: 2020-09-30 | Resolved: 2020-10-16

## 2020-10-16 PROBLEM — R11.2 NAUSEA & VOMITING: Status: RESOLVED | Noted: 2019-07-03 | Resolved: 2020-10-16

## 2020-10-16 PROCEDURE — 99024 POSTOP FOLLOW-UP VISIT: CPT | Performed by: SURGERY

## 2020-10-16 NOTE — PROGRESS NOTES
1. Have you been to the ER, urgent care clinic since your last visit? Hospitalized since your last visit?10/6/20 seen in ER for urinary retention. 2. Have you seen or consulted any other health care providers outside of the [address] [place] [address] since your last visit? Include any pap smears or colon screening.  No

## 2020-10-16 NOTE — PROGRESS NOTES
Subjective:      Trina Moseley is a 82 y.o. white male presents for postop care 2 weeks following a lap sukumar. Mr. Osvaldo Mir is doing better. His appetite is good, and he is eating a regular diet without difficulty. His bowel movements are regular without diarrhea. He has had difficulty voiding and has had a michelle catheter placed twice. He still has one in and is on Flomax. He is on Bactrim for a UTI as well. He is not having any pain or problems with his incisions. Objective:     Visit Vitals  /69 (BP 1 Location: Left arm, BP Patient Position: Sitting)   Pulse 99   Temp 98.3 °F (36.8 °C) (Oral)   Resp 18   Ht 6' (1.829 m)   Wt 199 lb (90.3 kg)   SpO2 95%   BMI 26.99 kg/m²       General:  alert, cooperative, no distress   Abdomen:  Soft, NT, ND. The incisions are clean, dry, and intact with no erythema except for mild erythema at the right lateral incision. HEENT:  Sclerae are anicteric. Assessment:     1st POV, s/p robot lap sukumar with firefly. Plan:     The pathology report was discussed with Mr. Osvaldo Mir. He is doing fine and can f/u with me prn.

## 2020-10-19 ENCOUNTER — TELEPHONE (OUTPATIENT)
Dept: SURGERY | Age: 85
End: 2020-10-19

## 2020-10-19 NOTE — TELEPHONE ENCOUNTER
Call center message 10/19/2020 8:15AM    \"recently had surgery, has not been able to go to the bathroom in 3 days, needs to speak  to someone asap\"      **Gerardo Christensen isn't on the patient's PHI form**

## 2020-10-19 NOTE — TELEPHONE ENCOUNTER
Mr Jm Yuriy returned call. He states everything is alright. He spoke with the Urologist regarding the issue. Everything is alright.

## 2020-12-09 ENCOUNTER — OFFICE VISIT (OUTPATIENT)
Dept: NEUROLOGY | Age: 85
End: 2020-12-09
Payer: MEDICARE

## 2020-12-09 VITALS
RESPIRATION RATE: 16 BRPM | OXYGEN SATURATION: 99 % | WEIGHT: 199 LBS | TEMPERATURE: 97.7 F | DIASTOLIC BLOOD PRESSURE: 70 MMHG | BODY MASS INDEX: 26.95 KG/M2 | HEIGHT: 72 IN | HEART RATE: 72 BPM | SYSTOLIC BLOOD PRESSURE: 150 MMHG

## 2020-12-09 DIAGNOSIS — R26.9 GAIT DISTURBANCE: Primary | ICD-10-CM

## 2020-12-09 PROCEDURE — G8754 DIAS BP LESS 90: HCPCS | Performed by: PSYCHIATRY & NEUROLOGY

## 2020-12-09 PROCEDURE — G8753 SYS BP > OR = 140: HCPCS | Performed by: PSYCHIATRY & NEUROLOGY

## 2020-12-09 PROCEDURE — 99215 OFFICE O/P EST HI 40 MIN: CPT | Performed by: PSYCHIATRY & NEUROLOGY

## 2020-12-09 PROCEDURE — G8419 CALC BMI OUT NRM PARAM NOF/U: HCPCS | Performed by: PSYCHIATRY & NEUROLOGY

## 2020-12-09 PROCEDURE — G8427 DOCREV CUR MEDS BY ELIG CLIN: HCPCS | Performed by: PSYCHIATRY & NEUROLOGY

## 2020-12-09 PROCEDURE — G8536 NO DOC ELDER MAL SCRN: HCPCS | Performed by: PSYCHIATRY & NEUROLOGY

## 2020-12-09 PROCEDURE — G8510 SCR DEP NEG, NO PLAN REQD: HCPCS | Performed by: PSYCHIATRY & NEUROLOGY

## 2020-12-09 PROCEDURE — 1101F PT FALLS ASSESS-DOCD LE1/YR: CPT | Performed by: PSYCHIATRY & NEUROLOGY

## 2020-12-09 RX ORDER — LOSARTAN POTASSIUM 50 MG/1
TABLET ORAL
COMMUNITY
Start: 2020-12-03 | End: 2021-09-20 | Stop reason: SDUPTHER

## 2020-12-09 RX ORDER — FINASTERIDE 5 MG/1
TABLET, FILM COATED ORAL
COMMUNITY
Start: 2020-12-01

## 2020-12-09 NOTE — PROGRESS NOTES
Neurology Progress Note    Patient ID:  John Joshua  [id number]  82 y.o.  1935      Subjective:   History:  John Joshua is a 82 y.o. 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 who yesterday developed nausea/vomiting/dizziness and R arm numbness prompting admission to the hospital in 7/4/2019. 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     2 yrs ago, patient started having dizziness, described as being off balanced liked a drunk. (-) nausea (-) moving     Patient saw ENT Dr Naveed Turner 1 1/2 yrs ago for dizziness which is being corrected by Epley maneuver. Also complaining of numbness of both feet for several yrs. ROS:  Per HPI-  Otherwise the remainder of ROS was negative    Social Hx  Social History     Socioeconomic History    Marital status:      Spouse name: Not on file    Number of children: Not on file    Years of education: Not on file    Highest education level: Not on file   Tobacco Use    Smoking status: Never Smoker    Smokeless tobacco: Never Used   Substance and Sexual Activity    Alcohol use: No    Drug use: Never       Meds:  Current Outpatient Medications on File Prior to Visit   Medication Sig Dispense Refill    finasteride (PROSCAR) 5 mg tablet TAKE ONE TABLET BY MOUTH EVERY DAY      losartan (COZAAR) 50 mg tablet TAKE ONE TABLET (50MG) BY MOUTH DAILY      rosuvastatin (CRESTOR) 10 mg tablet Take 10 mg by mouth nightly.  co-enzyme Q-10 (CO Q-10) 100 mg capsule Take 100 mg by mouth daily.  apixaban (ELIQUIS) 5 mg tablet Take 1 Tab by mouth two (2) times a day. Indications: CVA, ?PAF, recommended by cardiology and neurology 60 Tab 0    losartan-hydroCHLOROthiazide (HYZAAR) 50-12.5 mg per tablet Take 1 Tab by mouth daily.       ascorbic acid, vitamin C, (VITAMIN C) 500 mg tablet Take 500 mg by mouth daily. No current facility-administered medications on file prior to visit. Imaging:    CT Results (recent):  Results from Hospital Encounter encounter on 09/30/20   CT ABD PELV W CONT    Narrative INDICATION: upper abd pain, h/o splenic infarction    COMPARISON: prior contrast CT April 8, 2019 and noncontrast CT earlier today    TECHNIQUE:  Following the uneventful intravenous administration of IV contrast, thin axial  images were obtained through the abdomen and pelvis. Coronal and sagittal  reconstructions were generated. Oral contrast was not administered. CT dose  reduction was achieved through use of a standardized protocol tailored for this  examination and automatic exposure control for dose modulation. FINDINGS:  LUNG BASES: No abnormality. LIVER: No mass or biliary dilatation. GALLBLADDER: Distended with numerous large gallstones one of which is in the  fundus of the gallbladder. Small amount of pericholecystic fluid. SPLEEN: Mild areas of scarring. No splenomegaly. Several calcified granulomas. Small splenic vein. PANCREAS: No mass or ductal dilatation. ADRENALS: No mass. KIDNEYS: No mass, calculus, or hydronephrosis. GI TRACT: No bowel obstruction. Difficult to assess bowel wall thickening given  lack of oral contrast material.  PERITONEUM: No free air or free fluid. APPENDIX: Not visualized. RETROPERITONEUM: No aortic aneurysm. LYMPH NODES: None enlarged. ADDITIONAL COMMENTS: N/A.    URINARY BLADDER: Unremarkable. REPRODUCTIVE ORGANS: Enlarged prostate gland with heterogeneous enhancement  particularly anteriorly, unchanged. LYMPH NODES: None enlarged. FREE FLUID: None. BONES: No destructive bone lesion. ADDITIONAL COMMENTS: N/A. Impression IMPRESSION:    1. Small areas of splenic scarring related to previous infarctions. Small but  patent splenic vein.   2. Distended gallbladder containing several large gallstones one of which in the  fundus of the gallbladder with mild pericholecystic fluid. Correlate for any  clinical signs of acute cholecystitis. 3. Enlarged prostate with heterogeneous enhancement as previously described. MRI Results (recent):  Results from [place] encounter on 07/03/19   MRI BRAIN WO CONT    Narrative *PRELIMINARY REPORT*  Restricted diffusion to the left of midline in the [place] suggesting ischemia. Preliminary report was provided by Dr. Serjio Penaloza  Final report to follow. *END PRELIMINARY REPORT*  INDICATION:  Dizziness, ataxia     COMPARISON:  CT head parts from yesterday, MR brain 1/4/2019    TECHNIQUE:  MR imaging of the brain was performed with sagittal T1, axial T1,  T2, FLAIR, GRE, DWI/ADC;     FINDINGS:      The ventricles are midline without hydrocephalus. There is no acute intra or extra-axial fluid collection. Mild focus of  restricted diffusion in the left [place] and small focus of diffusion signal in the  left midbrain compatible with acute infarctions. Mild to moderate  periventricular and scattered bilateral subcortical increased T2/flair signal  abnormalities are nonspecific but likely represent changes of chronic small  vessel ischemic disease. The major intracranial vascular flow-voids are patent. No abnormal signal in the mastoid air cells or visualized paranasal sinuses. Visualized soft tissues unremarkable. Impression IMPRESSION:    Small left pontine and left midbrain acute infarctions. Mild to moderate white matter signal abnormalities likely represent changes of  chronic small vessel ischemic disease      Findings discussed with patient's nurse Shari at 12:34 PM 7/4/2019                IR Results (recent):  No results found for this or any previous visit.     VAS/US Results (recent):  Results from Hospital Encounter encounter on 04/06/15   DUPLEX CAROTID BILATERAL       Reviewed records in [facility] and media tab today    Lab Review     Admission on 10/06/2020, Discharged on 10/06/2020   Component Date Value Ref Range Status    WBC 10/06/2020 8.9  4.1 - 11.1 K/uL Final    RBC 10/06/2020 4.89  4. 10 - 5.70 M/uL Final    HGB 10/06/2020 16.1  12.1 - 17.0 g/dL Final    HCT 10/06/2020 45.8  36.6 - 50.3 % Final    MCV 10/06/2020 93.7  80.0 - 99.0 FL Final    MCH 10/06/2020 32.9  26.0 - 34.0 PG Final    MCHC 10/06/2020 35.2  30.0 - 36.5 g/dL Final    RDW 10/06/2020 13.2  11.5 - 14.5 % Final    PLATELET 85/23/0746 874  150 - 400 K/uL Final    MPV 10/06/2020 9.4  8.9 - 12.9 FL Final    NRBC 10/06/2020 0.0  0  WBC Final    ABSOLUTE NRBC 10/06/2020 0.00  0.00 - 0.01 K/uL Final    NEUTROPHILS 10/06/2020 70  32 - 75 % Final    LYMPHOCYTES 10/06/2020 15  12 - 49 % Final    MONOCYTES 10/06/2020 12  5 - 13 % Final    EOSINOPHILS 10/06/2020 1  0 - 7 % Final    BASOPHILS 10/06/2020 1  0 - 1 % Final    IMMATURE GRANULOCYTES 10/06/2020 1* 0.0 - 0.5 % Final    ABS. NEUTROPHILS 10/06/2020 6.3  1.8 - 8.0 K/UL Final    ABS. LYMPHOCYTES 10/06/2020 1.3  0.8 - 3.5 K/UL Final    ABS. MONOCYTES 10/06/2020 1.1* 0.0 - 1.0 K/UL Final    ABS. EOSINOPHILS 10/06/2020 0.1  0.0 - 0.4 K/UL Final    ABS. BASOPHILS 10/06/2020 0.1  0.0 - 0.1 K/UL Final    ABS. IMM. GRANS.  10/06/2020 0.1* 0.00 - 0.04 K/UL Final    DF 10/06/2020 AUTOMATED    Final    Sodium 10/06/2020 140  136 - 145 mmol/L Final    Potassium 10/06/2020 3.2* 3.5 - 5.1 mmol/L Final    Chloride 10/06/2020 107  97 - 108 mmol/L Final    CO2 10/06/2020 25  21 - 32 mmol/L Final    Anion gap 10/06/2020 8  5 - 15 mmol/L Final    Glucose 10/06/2020 108* 65 - 100 mg/dL Final    BUN 10/06/2020 22* 6 - 20 MG/DL Final    Creatinine 10/06/2020 1.23  0.70 - 1.30 MG/DL Final    BUN/Creatinine ratio 10/06/2020 18  12 - 20   Final    GFR est AA 10/06/2020 >60  >60 ml/min/1.73m2 Final    GFR est non-AA 10/06/2020 56* >60 ml/min/1.73m2 Final    Calcium 10/06/2020 8.5  8.5 - 10.1 MG/DL Final    Bilirubin, total 10/06/2020 0.9  0.2 - 1.0 MG/DL Final    ALT (SGPT) 10/06/2020 35  12 - 78 U/L Final    AST (SGOT) 10/06/2020 37  15 - 37 U/L Final    Alk. phosphatase 10/06/2020 71  45 - 117 U/L Final    Protein, total 10/06/2020 5.9* 6.4 - 8.2 g/dL Final    Albumin 10/06/2020 2.7* 3.5 - 5.0 g/dL Final    Globulin 10/06/2020 3.2  2.0 - 4.0 g/dL Final    A-G Ratio 10/06/2020 0.8* 1.1 - 2.2   Final    Color 10/06/2020 YELLOW/STRAW    Final    Color Reference Range: Straw, Yellow or Dark Yellow    Appearance 10/06/2020 CLEAR  CLEAR   Final    Specific gravity 10/06/2020 1.008  1.003 - 1.030   Final    pH (UA) 10/06/2020 6.5  5.0 - 8.0   Final    Protein 10/06/2020 Negative  NEG mg/dL Final    Glucose 10/06/2020 Negative  NEG mg/dL Final    Ketone 10/06/2020 Negative  NEG mg/dL Final    Bilirubin 10/06/2020 Negative  NEG   Final    Blood 10/06/2020 TRACE* NEG   Final    Urobilinogen 10/06/2020 1.0  0.2 - 1.0 EU/dL Final    Nitrites 10/06/2020 Negative  NEG   Final    Leukocyte Esterase 10/06/2020 Negative  NEG   Final    WBC 10/06/2020 0-4  0 - 4 /hpf Final    RBC 10/06/2020 0-5  0 - 5 /hpf Final    Epithelial cells 10/06/2020 FEW  FEW /lpf Final    Epithelial cell category consists of squamous cells and /or transitional urothelial cells. Renal tubular cells, if present, are separately identified as such.  Bacteria 10/06/2020 Negative  NEG /hpf Final    Hyaline cast 10/06/2020 0-2  0 - 5 /lpf Final    Urine culture hold 10/06/2020 Urine on hold in Microbiology dept for 2 days. If unpreserved urine is submitted, it cannot be used for addtional testing after 24 hours, recollection will be required. Final   No results displayed because visit has over 200 results.             Objective:       Exam:  Visit Vitals  BP (!) 150/70 (BP 1 Location: Right arm, BP Patient Position: Sitting)   Pulse 72   Temp 97.7 °F (36.5 °C) (Temporal)   Resp 16   Ht 6' (1.829 m)   Wt 90.3 kg (199 lb)   SpO2 99%   BMI 26.99 kg/m²     Gen: Awake, alert, follows commands  Appropriate appearance, normal speech. Oriented to all spheres. No visual field defect on confrontation exam.  Full eyes movement, with no nystagmus, no diplopia, no ptosis. Normal gag and swallow. All remaining cranial nerves were normal  Motor function: 5/5 in all extremities  Sensory: intact to LT, PP and JPS  DTRs + in all extremities, (-) Babinski  Good FTN and HTS   Gait: slightly drags L LE. Able to do tandem walking (-) [place]    Assessment:       ICD-10-CM ICD-9-CM    1. Gait disturbance  R26.9 781.2 VITAMIN B12 & FOLATE      TSH 3RD GENERATION     Gait disturbance, which may be multifactorial (history of vertigo, strokes and polyneuropathy)        Plan:   1. Blood test for Vit B12, Folate and TSH. Patient to call for results  2. Offered doing EMG/NCS to assess numbness in his feet to confirm polyneuropathy. Patient declined  3. Discussed doing physical therapy for gait and balance training. Patient declined stating he keeps active at home  4. Already on Eliquis for stroke and paroxysmal afib      Follow-up and Dispositions    · Return if symptoms worsen or fail to improve.                Bakari Niño MD  Diplomate, American Board of Psychiatry and Neurology  Diplomate, Neuromuscular Medicine  Diplomate, American Board of Electrodiagnostic Medicine

## 2020-12-09 NOTE — PROGRESS NOTES
Chief Complaint   Patient presents with    New Patient     States that he feels unsteady since stroke 7/3/19. He states he has not had any falls.      Visit Vitals  BP (!) 150/70 (BP 1 Location: Right arm, BP Patient Position: Sitting)   Pulse 72   Temp 97.7 °F (36.5 °C) (Temporal)   Resp 16   Ht 6' (1.829 m)   Wt 90.3 kg (199 lb)   SpO2 99%   BMI 26.99 kg/m²

## 2020-12-09 NOTE — LETTER
12/9/20 Patient: Concepcion Islas YOB: 1935 Date of Visit: 12/9/2020 Phuc Navarro MD 
[address] [place] [address] VIA Facsimile: [phone] Dear Phuc Navarro MD, Thank you for referring Mr. Radha Oviedo to [facility] for evaluation. My notes for this consultation are attached. If you have questions, please do not hesitate to call me. I look forward to following your patient along with you. Sincerely, Read Sever, MD

## 2020-12-10 LAB
FOLATE SERPL-MCNC: 6.9 NG/ML
TSH SERPL DL<=0.005 MIU/L-ACNC: 2.72 UIU/ML (ref 0.45–4.5)
VIT B12 SERPL-MCNC: 308 PG/ML (ref 232–1245)

## 2020-12-18 NOTE — PROGRESS NOTES
MD Mars Harman, April M, LPN  Pls inform patient he has low normal Vit B12.   P> Advise to take Vit B12 1000 mcg every day to see if this will help with his gait and balance issues  12/18/20 Called and spoke to patient. I advised him of lab results and MDs advice. Patient states understanding.  Dejon Archer LPN

## 2021-01-07 ENCOUNTER — TELEPHONE (OUTPATIENT)
Dept: CARDIOLOGY CLINIC | Age: 86
End: 2021-01-07

## 2021-01-07 NOTE — TELEPHONE ENCOUNTER
Spoke to pt,  Monitor at home states that he is in a fib. Went to PCP today. Vertigo x2 weeks. Pulse 80's, denies s/s. PCP didn't do EKG. Already had apt with us 1/11/21 at [address] (more convenient location for pt). Advised if SVT to head to ED over the weekend, keep apt with us on Monday. Pt agreeable. Records have been requested from PCP.

## 2021-01-07 NOTE — TELEPHONE ENCOUNTER
Patient states he's been in afib for 3 days and would like to be squeezed in to see MD.      Phone: [phone]

## 2021-01-11 ENCOUNTER — OFFICE VISIT (OUTPATIENT)
Dept: CARDIOLOGY CLINIC | Age: 86
End: 2021-01-11
Payer: MEDICARE

## 2021-01-11 VITALS
HEART RATE: 67 BPM | WEIGHT: 212 LBS | OXYGEN SATURATION: 99 % | DIASTOLIC BLOOD PRESSURE: 78 MMHG | BODY MASS INDEX: 28.71 KG/M2 | SYSTOLIC BLOOD PRESSURE: 150 MMHG | HEIGHT: 72 IN

## 2021-01-11 DIAGNOSIS — I48.0 PAF (PAROXYSMAL ATRIAL FIBRILLATION) (HCC): Primary | ICD-10-CM

## 2021-01-11 DIAGNOSIS — I44.7 LBBB (LEFT BUNDLE BRANCH BLOCK): ICD-10-CM

## 2021-01-11 DIAGNOSIS — I10 ESSENTIAL HYPERTENSION: ICD-10-CM

## 2021-01-11 PROCEDURE — G8536 NO DOC ELDER MAL SCRN: HCPCS | Performed by: SPECIALIST

## 2021-01-11 PROCEDURE — 99214 OFFICE O/P EST MOD 30 MIN: CPT | Performed by: SPECIALIST

## 2021-01-11 PROCEDURE — 1101F PT FALLS ASSESS-DOCD LE1/YR: CPT | Performed by: SPECIALIST

## 2021-01-11 PROCEDURE — G8510 SCR DEP NEG, NO PLAN REQD: HCPCS | Performed by: SPECIALIST

## 2021-01-11 PROCEDURE — 93010 ELECTROCARDIOGRAM REPORT: CPT | Performed by: SPECIALIST

## 2021-01-11 PROCEDURE — G8419 CALC BMI OUT NRM PARAM NOF/U: HCPCS | Performed by: SPECIALIST

## 2021-01-11 PROCEDURE — G8753 SYS BP > OR = 140: HCPCS | Performed by: SPECIALIST

## 2021-01-11 PROCEDURE — G8427 DOCREV CUR MEDS BY ELIG CLIN: HCPCS | Performed by: SPECIALIST

## 2021-01-11 PROCEDURE — 93005 ELECTROCARDIOGRAM TRACING: CPT | Performed by: SPECIALIST

## 2021-01-11 PROCEDURE — G0463 HOSPITAL OUTPT CLINIC VISIT: HCPCS | Performed by: SPECIALIST

## 2021-01-11 PROCEDURE — G8754 DIAS BP LESS 90: HCPCS | Performed by: SPECIALIST

## 2021-01-11 RX ORDER — CARVEDILOL 3.12 MG/1
3.12 TABLET ORAL 2 TIMES DAILY WITH MEALS
Qty: 180 TAB | Refills: 3 | Status: SHIPPED | OUTPATIENT
Start: 2021-01-11 | End: 2021-02-24

## 2021-01-11 NOTE — PROGRESS NOTES
No chief complaint on file. Visit Vitals  BP (!) 150/78 (BP 1 Location: Right arm, BP Patient Position: Sitting)   Pulse 67   Ht 6' (1.829 m)   Wt 212 lb (96.2 kg)   SpO2 99%   BMI 28.75 kg/m²     Chest pain denied   SOB denied   Palpitations denied   Swelling in hands/feet denied   Dizziness denied   Recent hospital stays denied   Refills denied     A fib past few weeks; [facility] rehab to check Vertigo next Monday.

## 2021-01-11 NOTE — PROGRESS NOTES
Rubén Kauffman MD. [facility]              Patient: Nae Burrell  : 1935      Today's Date: 2021          HISTORY OF PRESENT ILLNESS:     History of Present Illness:  He was seen by Dr. Reanna Gonzalez and had an irregular beat and though he may be having Afib so asked to see us. Kardia monitor noted \"possible afib\". He is having vertigo when he is in bed at night - does better as day goes along. He was diagnosed with Afib 20 - and started on [place] [facility] then. Bp has been high lately. Denies heart racing spells. PAST MEDICAL HISTORY:     Past Medical History:   Diagnosis Date    Benign hypertension     Chest pain, unspecified     Dyspnea     Hypertension     LBBB (left bundle branch block)     PAF (paroxysmal atrial fibrillation) (HCC)     Palpitations     Splenic infarct     splenic infarcts     Splenic vein thrombosis         Stroke ([facility] [address]     Brain MRI 7/3/19 - Small left pontine and left midbrain acute infarctions.  Vertigo          Past Surgical History:   Procedure Laterality Date    HX CHOLECYSTECTOMY  10/02/2020    Robotic-assisted laparoscopic with firefly.  HX TONSIL AND ADENOIDECTOMY             MEDICATIONS:     Current Outpatient Medications   Medication Sig Dispense Refill    zinc 50 mg tab tablet Take  by mouth daily.  finasteride (PROSCAR) 5 mg tablet TAKE ONE TABLET BY MOUTH EVERY DAY      losartan (COZAAR) 50 mg tablet TAKE ONE TABLET (50MG) BY MOUTH DAILY      rosuvastatin (CRESTOR) 10 mg tablet Take 10 mg by mouth nightly.  co-enzyme Q-10 (CO Q-10) 100 mg capsule Take 100 mg by mouth daily.  apixaban (ELIQUIS) 5 mg tablet Take 1 Tab by mouth two (2) times a day. Indications: CVA, ?PAF, recommended by cardiology and neurology 60 Tab 0    ascorbic acid, vitamin C, (VITAMIN C) 500 mg tablet Take 500 mg by mouth daily.          Allergies   Allergen Reactions    Beta-Blockers (Beta-Adrenergic Blocking Agts) Other (comments)     \"It gives me a heart attack\", per pt med causes chest to swell and chest pain. 7/3/19 - pt, spouse and child deny this allergy he has been taking propranolol since Feb 2019 without issue    Pcn [Penicillins] Unknown (comments)           SOCIAL HISTORY:     Social History     Tobacco Use    Smoking status: Never Smoker    Smokeless tobacco: Never Used   Substance Use Topics    Alcohol use: No    Drug use: Never         FAMILY HISTORY:     Family History   Problem Relation Age of Onset    Cancer Mother     Stroke Mother     Cancer Father             REVIEW OF SYMPTOMS:      Review of Symptoms:  Constitutional: Negative for fever, chills  HEENT: Negative for nosebleeds, tinnitus, and vision changes. Respiratory: Negative for cough, wheezing  Cardiovascular: Negative for orthopnea, claudication, syncope, and PND. Gastrointestinal: Negative for abdominal pain, diarrhea, melena. Genitourinary: Negative for dysuria  Musculoskeletal: Negative for myalgias. Skin: Negative for rash  Heme: No problems bleeding. Neurological: Negative for speech change and focal weakness.   + poor balance, vertigo         PHYSICAL EXAM:      Physical Exam:  Visit Vitals  BP (!) 150/78 (BP 1 Location: Right arm, BP Patient Position: Sitting)   Pulse 67   Ht 6' (1.829 m)   Wt 212 lb (96.2 kg)   SpO2 99%   BMI 28.75 kg/m²       Patient appears generally well, mood and affect are appropriate and pleasant. HEENT:  Hearing intact, non-icteric, normocephalic, atraumatic. Neck Exam: Supple, No JVD or carotid bruits. Lung Exam: Clear to auscultation, even breath sounds. Cardiac Exam: Regular rate and rhythm with no murmur or rub  Abdomen: Soft, non-tender, normal bowel sounds. No bruits or masses. Extremities: Moves all ext well. No lower extremity edema. Vascular: 2+ dorsalis pedis pulses bilaterally.   Psych: Appropriate affect  Neuro - Grossly intact        LABS / OTHER STUDIES:        Lab Results   Component Value Date/Time Sodium 140 10/06/2020 08:26 AM    Potassium 3.2 (L) 10/06/2020 08:26 AM    Chloride 107 10/06/2020 08:26 AM    CO2 25 10/06/2020 08:26 AM    Anion gap 8 10/06/2020 08:26 AM    Glucose 108 (H) 10/06/2020 08:26 AM    BUN 22 (H) 10/06/2020 08:26 AM    Creatinine 1.23 10/06/2020 08:26 AM    BUN/Creatinine ratio 18 10/06/2020 08:26 AM    GFR est AA >60 10/06/2020 08:26 AM    GFR est non-AA 56 (L) 10/06/2020 08:26 AM    Calcium 8.5 10/06/2020 08:26 AM    Bilirubin, total 0.9 10/06/2020 08:26 AM    Alk. phosphatase 71 10/06/2020 08:26 AM    Protein, total 5.9 (L) 10/06/2020 08:26 AM    Albumin 2.7 (L) 10/06/2020 08:26 AM    Globulin 3.2 10/06/2020 08:26 AM    A-G Ratio 0.8 (L) 10/06/2020 08:26 AM    ALT (SGPT) 35 10/06/2020 08:26 AM    AST (SGOT) 37 10/06/2020 08:26 AM     Lab Results   Component Value Date/Time    WBC 8.9 10/06/2020 08:26 AM    HGB 16.1 10/06/2020 08:26 AM    HCT 45.8 10/06/2020 08:26 AM    PLATELET 139 81/28/3478 08:26 AM    MCV 93.7 10/06/2020 08:26 AM       Lab Results   Component Value Date/Time    Cholesterol, total 118 07/04/2019 07:32 AM    HDL Cholesterol 45 07/04/2019 07:32 AM    LDL, calculated 57.8 07/04/2019 07:32 AM    VLDL, calculated 15.2 07/04/2019 07:32 AM    Triglyceride 76 07/04/2019 07:32 AM    CHOL/HDL Ratio 2.6 07/04/2019 07:32 AM                   CARDIAC DIAGNOSTICS:      Cardiac Evaluation Includes:        Echo 4/9/15 - LVEF 40% (EKG showed a LBBB)   Echo 6/22/16 - LVEF 50%  Holter 12/28/18 - normal study   Echo 12/31/18 - LVEF 45%  CTA Abd 12/11/18 - Geographic areas of low attenuation in the spleen suggesting infarcts. Echo 12/18 - LVEF 55-60%  CTA 4/8/19 Real Bustard of scarring are noted in the spleen compatible with healing infarcts.  Evidence  of chronic splenic vein thrombosis.  Marked prostate enlargement with nodule. Echo 7/4/19 - LVEF 55%, negative bubble study   RICKY 7/5/19 - LVEF 60%  Brain MRI 7/3/19 - Small left pontine and left midbrain acute infarctions.   Coronary CTA 11/6/19 - CAC Score 70.  Mild disease.             EKG 12/11/18 - NSR, LBBB  EKG 8/6/20 - NSR, LBBB  EKG 9/30/20 - sinus alfonso, LBBB  EKG 10/2/20 - Afib, LBBB  - I viewed myself   EKG 1/11/21 - TDS, Probably NSR, PAC's  (vs possible atrial flutter)            ASSESSMENT AND PLAN:      Assessment and Plan:  1) Paroxysmal Afib   - PAF discovered 10/2/20   - He may be having PAF spells at home (based on Kardia readings) - however HR is OK at home   - Continue [facility]  - I discussed checking a 30 day monitor to see if there are any symptoms associated with Afib -- I don't think his vertigo is related to PAF - he is not interested in repeating a monitor at this time   - Add Coreg 3.125 mg BID for HTN and PAF    2) CVA  - Acute CVA 7/19 - Small left pontine and left midbrain acute infarctions  - started [facility] then since were worried about an embolic event   - PAF discovered 10/2/20   - Has poor balance since his stroke but still able to stay active   - cont statin      3) Splenic Vein Thrombosis 12/18  - saw Dr. Luna Melissa      4) Mild atherosclerosis on Coronary CTA and dyslipidemia   - cont statin   - lipids followed       5) HTN  - BP has been high ---> add Coreg to regimen      6) Vertigo   -[email or web address] has had poor balance since 12/18 (splenic infarcts) - has seen ENT for this and treated for BPPV but problems persisted.      7) See me in 6 weeks.   Patient expressed understanding of the plan - questions were answered.      Retired . [address] [facility] 4 kids.         Codi Cervantes MD, [address] [place]  [address] [place] [address], [address]      [id number] [address], [address] [place] [address], [address] [place] [address]  Ph: [phone]                                [phone]       ADDENDUM   1/12/2021  PCP records reviewed and sent to scanning.

## 2021-02-24 ENCOUNTER — OFFICE VISIT (OUTPATIENT)
Dept: CARDIOLOGY CLINIC | Age: 86
End: 2021-02-24
Payer: MEDICARE

## 2021-02-24 VITALS
HEART RATE: 66 BPM | DIASTOLIC BLOOD PRESSURE: 60 MMHG | OXYGEN SATURATION: 96 % | SYSTOLIC BLOOD PRESSURE: 130 MMHG | BODY MASS INDEX: 29.26 KG/M2 | HEIGHT: 72 IN | WEIGHT: 216 LBS

## 2021-02-24 DIAGNOSIS — I10 ESSENTIAL HYPERTENSION: ICD-10-CM

## 2021-02-24 DIAGNOSIS — I63.9 CEREBROVASCULAR ACCIDENT (CVA), UNSPECIFIED MECHANISM (HCC): ICD-10-CM

## 2021-02-24 DIAGNOSIS — I48.0 PAF (PAROXYSMAL ATRIAL FIBRILLATION) (HCC): Primary | ICD-10-CM

## 2021-02-24 PROCEDURE — G8419 CALC BMI OUT NRM PARAM NOF/U: HCPCS | Performed by: SPECIALIST

## 2021-02-24 PROCEDURE — 1101F PT FALLS ASSESS-DOCD LE1/YR: CPT | Performed by: SPECIALIST

## 2021-02-24 PROCEDURE — G8752 SYS BP LESS 140: HCPCS | Performed by: SPECIALIST

## 2021-02-24 PROCEDURE — G0463 HOSPITAL OUTPT CLINIC VISIT: HCPCS | Performed by: SPECIALIST

## 2021-02-24 PROCEDURE — G8536 NO DOC ELDER MAL SCRN: HCPCS | Performed by: SPECIALIST

## 2021-02-24 PROCEDURE — G8754 DIAS BP LESS 90: HCPCS | Performed by: SPECIALIST

## 2021-02-24 PROCEDURE — 99214 OFFICE O/P EST MOD 30 MIN: CPT | Performed by: SPECIALIST

## 2021-02-24 PROCEDURE — G8432 DEP SCR NOT DOC, RNG: HCPCS | Performed by: SPECIALIST

## 2021-02-24 PROCEDURE — G8427 DOCREV CUR MEDS BY ELIG CLIN: HCPCS | Performed by: SPECIALIST

## 2021-02-24 RX ORDER — TRIAMTERENE AND HYDROCHLOROTHIAZIDE 37.5; 25 MG/1; MG/1
CAPSULE ORAL DAILY
COMMUNITY
End: 2021-02-24 | Stop reason: SDUPTHER

## 2021-02-24 RX ORDER — TRIAMTERENE AND HYDROCHLOROTHIAZIDE 37.5; 25 MG/1; MG/1
1 CAPSULE ORAL
Qty: 30 CAP | Refills: 0
Start: 2021-02-24

## 2021-02-24 RX ORDER — TAMSULOSIN HYDROCHLORIDE 0.4 MG/1
0.4 CAPSULE ORAL 2 TIMES DAILY
COMMUNITY

## 2021-02-24 RX ORDER — GLUCOSAMINE SULFATE 1500 MG
POWDER IN PACKET (EA) ORAL DAILY
COMMUNITY

## 2021-02-24 NOTE — PROGRESS NOTES
Jeffrey Wilcox is a 82 y.o. male    Visit Vitals  /60 (BP 1 Location: Left upper arm, BP Patient Position: Sitting, BP Cuff Size: Adult)   Pulse 66   Ht 6' (1.829 m)   Wt 216 lb (98 kg)   SpO2 96%   BMI 29.29 kg/m²       Chief Complaint   Patient presents with    Hypertension    Other     PAF    Other     OBREGON       Chest pain NO  SOB NO  Dizziness WHEN GETTING OUT OF BED  Swelling NO  Recent hospital visit NO  Refills NO

## 2021-02-24 NOTE — PROGRESS NOTES
Ghada Schumacher MD. [facility] [place]              Patient: Gui Armendariz  : 1935      Today's Date: 2021          HISTORY OF PRESENT ILLNESS:     History of Present Illness:  When he gets up in morning he gets a little lightheaded when he stands out of bed. Other than that he is OK. BP has been up and down at first, but now more stable. PAST MEDICAL HISTORY:     Past Medical History:   Diagnosis Date    Benign hypertension     Chest pain, unspecified     Dyspnea     Hypertension     LBBB (left bundle branch block)     PAF (paroxysmal atrial fibrillation) (HCC)     Palpitations     Splenic infarct     splenic infarcts     Splenic vein thrombosis         Stroke ([facility] [address]     Brain MRI 7/3/19 - Small left pontine and left midbrain acute infarctions.  Vertigo          Past Surgical History:   Procedure Laterality Date    HX CHOLECYSTECTOMY  10/02/2020    Robotic-assisted laparoscopic with firefly.  HX TONSIL AND ADENOIDECTOMY             MEDICATIONS:     Current Outpatient Medications   Medication Sig Dispense Refill    tamsulosin (FLOMAX) 0.4 mg capsule Take 0.4 mg by mouth daily.  cholecalciferol (Vitamin D3) 25 mcg (1,000 unit) cap Take  by mouth daily.  triamterene-hydroCHLOROthiazide (DYAZIDE) 37.5-25 mg per capsule Take 1 Cap by mouth every Monday, Wednesday, Friday. 30 Cap 0    zinc 50 mg tab tablet Take  by mouth daily.  finasteride (PROSCAR) 5 mg tablet TAKE ONE TABLET BY MOUTH EVERY DAY      losartan (COZAAR) 50 mg tablet TAKE ONE TABLET (50MG) BY MOUTH DAILY      rosuvastatin (CRESTOR) 10 mg tablet Take 10 mg by mouth nightly.  co-enzyme Q-10 (CO Q-10) 100 mg capsule Take 100 mg by mouth daily.  apixaban (ELIQUIS) 5 mg tablet Take 1 Tab by mouth two (2) times a day. Indications: CVA, ?PAF, recommended by cardiology and neurology 60 Tab 0    ascorbic acid, vitamin C, (VITAMIN C) 500 mg tablet Take 500 mg by mouth daily. Allergies   Allergen Reactions    Beta-Blockers (Beta-Adrenergic Blocking Agts) Other (comments)     \"It gives me a heart attack\", per pt med causes chest to swell and chest pain. 7/3/19 - pt, spouse and child deny this allergy he has been taking propranolol since Feb 2019 without issue    Pcn [Penicillins] Unknown (comments)           SOCIAL HISTORY:     Social History     Tobacco Use    Smoking status: Never Smoker    Smokeless tobacco: Never Used   Substance Use Topics    Alcohol use: No    Drug use: Never         FAMILY HISTORY:     Family History   Problem Relation Age of Onset    Cancer Mother     Stroke Mother     Cancer Father             REVIEW OF SYMPTOMS:      Review of Symptoms:  Constitutional: Negative for fever, chills  HEENT: Negative for nosebleeds, tinnitus, and vision changes. Respiratory: Negative for cough, wheezing  Cardiovascular: Negative for orthopnea, claudication, syncope, and PND. Gastrointestinal: Negative for abdominal pain, diarrhea, melena. Genitourinary: Negative for dysuria  Musculoskeletal: Negative for myalgias. Skin: Negative for rash  Heme: No problems bleeding. Neurological: Negative for speech change and focal weakness.   + poor balance, vertigo         PHYSICAL EXAM:      Physical Exam:  Visit Vitals  /60 (BP 1 Location: Left upper arm, BP Patient Position: Sitting, BP Cuff Size: Adult)   Pulse 66   Ht 6' (1.829 m)   Wt 216 lb (98 kg)   SpO2 96%   BMI 29.29 kg/m²       Patient appears generally well, mood and affect are appropriate and pleasant. HEENT:  Hearing intact, non-icteric, normocephalic, atraumatic. Neck Exam: Supple, No JVD or carotid bruits. Lung Exam: Clear to auscultation, even breath sounds. Cardiac Exam: Regular rate and rhythm with no murmur or rub  Abdomen: Soft, non-tender, normal bowel sounds. No bruits or masses. Extremities: Moves all ext well. No lower extremity edema.   Vascular: 2+ dorsalis pedis pulses bilaterally. Psych: Appropriate affect  Neuro - Grossly intact        LABS / OTHER STUDIES:      Lab Results   Component Value Date/Time    Sodium 140 10/06/2020 08:26 AM    Potassium 3.2 (L) 10/06/2020 08:26 AM    Chloride 107 10/06/2020 08:26 AM    CO2 25 10/06/2020 08:26 AM    Anion gap 8 10/06/2020 08:26 AM    Glucose 108 (H) 10/06/2020 08:26 AM    BUN 22 (H) 10/06/2020 08:26 AM    Creatinine 1.23 10/06/2020 08:26 AM    BUN/Creatinine ratio 18 10/06/2020 08:26 AM    GFR est AA >60 10/06/2020 08:26 AM    GFR est non-AA 56 (L) 10/06/2020 08:26 AM    Calcium 8.5 10/06/2020 08:26 AM    Bilirubin, total 0.9 10/06/2020 08:26 AM    Alk. phosphatase 71 10/06/2020 08:26 AM    Protein, total 5.9 (L) 10/06/2020 08:26 AM    Albumin 2.7 (L) 10/06/2020 08:26 AM    Globulin 3.2 10/06/2020 08:26 AM    A-G Ratio 0.8 (L) 10/06/2020 08:26 AM    ALT (SGPT) 35 10/06/2020 08:26 AM    AST (SGOT) 37 10/06/2020 08:26 AM     Lab Results   Component Value Date/Time    WBC 8.9 10/06/2020 08:26 AM    HGB 16.1 10/06/2020 08:26 AM    HCT 45.8 10/06/2020 08:26 AM    PLATELET 499 49/89/6623 08:26 AM    MCV 93.7 10/06/2020 08:26 AM     Lab Results   Component Value Date/Time    Cholesterol, total 118 07/04/2019 07:32 AM    HDL Cholesterol 45 07/04/2019 07:32 AM    LDL, calculated 57.8 07/04/2019 07:32 AM    VLDL, calculated 15.2 07/04/2019 07:32 AM    Triglyceride 76 07/04/2019 07:32 AM    CHOL/HDL Ratio 2.6 07/04/2019 07:32 AM       Lab Results   Component Value Date/Time    TSH 2.720 12/09/2020 02:09 PM             CARDIAC DIAGNOSTICS:      Cardiac Evaluation Includes:        Echo 4/9/15 - LVEF 40% (EKG showed a LBBB)   Echo 6/22/16 - LVEF 50%  Holter 12/28/18 - normal study   Echo 12/31/18 - LVEF 45%  CTA Abd 12/11/18 - Geographic areas of low attenuation in the spleen suggesting infarcts.   Echo 12/18 - LVEF 55-60%  CTA 4/8/19 Janet Stokes of scarring are noted in the spleen compatible with healing infarcts.  Evidence  of chronic splenic vein thrombosis.  Marked prostate enlargement with nodule. Echo 7/4/19 - LVEF 55%, negative bubble study   RICKY 7/5/19 - LVEF 60%  Brain MRI 7/3/19 - Small left pontine and left midbrain acute infarctions. Coronary CTA 11/6/19 - CAC Score 70.  Mild disease.             EKG 12/11/18 - NSR, LBBB  EKG 8/6/20 - NSR, LBBB  EKG 9/30/20 - sinus alfonso, LBBB  EKG 10/2/20 - Afib, LBBB  - I viewed myself   EKG 1/11/21 - TDS, Probably NSR, PAC's  (vs possible atrial flutter)            ASSESSMENT AND PLAN:      Assessment and Plan:  1) Paroxysmal Afib   - PAF discovered 10/2/20   - He may be having PAF spells at home (based on Kardia readings) - however HR is OK at home   - Continue [facility]  - I discussed checking a 30 day monitor to see if there are any symptoms associated with Afib -- I don't think his vertigo is related to PAF - he is not interested in repeating a monitor at this time   - Will follow clinically     2) CVA  - Acute CVA 7/19 - Small left pontine and left midbrain acute infarctions  - started [facility] then since were worried about an embolic event   - PAF discovered 10/2/20   - Has poor balance since his stroke but still able to stay active   - cont statin (he cuts dose in half)      3) Splenic Vein Thrombosis 12/18  - saw Dr. Karon Serrano      4) Mild atherosclerosis on Coronary CTA and dyslipidemia   - cont statin   - lipids followed       5) HTN  - BP is better on current regimen   - continue meds above      6) Vertigo   -Shuan Torres has had poor balance since 12/18 (splenic infarcts) - has seen ENT for this and treated for BPPV but problems persisted.      7) See me in 6 months.   Patient expressed understanding of the plan - questions were answered.      Retired . [address] [facility] 4 kids.         Mynor Rae MD, [address] [place]  [address] [place], [address] [id number]  [address]  [facility], [address] [place] [address]  Ph: [phone]                                [phone]

## 2021-05-06 ENCOUNTER — HOSPITAL ENCOUNTER (OUTPATIENT)
Dept: GENERAL RADIOLOGY | Age: 86
Discharge: HOME | End: 2021-05-06
Attending: FAMILY MEDICINE
Payer: MEDICARE

## 2021-05-06 ENCOUNTER — TRANSCRIBE ORDER (OUTPATIENT)
Dept: REGISTRATION | Age: 86
End: 2021-05-06

## 2021-05-06 DIAGNOSIS — S92.406A: ICD-10-CM

## 2021-05-06 DIAGNOSIS — S92.406A: Primary | ICD-10-CM

## 2021-05-06 PROCEDURE — 73660 X-RAY EXAM OF TOE(S): CPT

## 2021-07-20 ENCOUNTER — OFFICE VISIT (OUTPATIENT)
Dept: SURGERY | Age: 86
End: 2021-07-20
Payer: MEDICARE

## 2021-07-20 ENCOUNTER — HOSPITAL ENCOUNTER (OUTPATIENT)
Dept: GENERAL RADIOLOGY | Age: 86
Discharge: HOME | End: 2021-07-20
Payer: MEDICARE

## 2021-07-20 VITALS
BODY MASS INDEX: 29.26 KG/M2 | SYSTOLIC BLOOD PRESSURE: 155 MMHG | RESPIRATION RATE: 18 BRPM | OXYGEN SATURATION: 97 % | TEMPERATURE: 98.2 F | DIASTOLIC BLOOD PRESSURE: 90 MMHG | HEIGHT: 72 IN | HEART RATE: 89 BPM | WEIGHT: 216 LBS

## 2021-07-20 DIAGNOSIS — R10.32 LEFT GROIN PAIN: Primary | ICD-10-CM

## 2021-07-20 DIAGNOSIS — R10.31 RIGHT GROIN PAIN: ICD-10-CM

## 2021-07-20 PROCEDURE — 99202 OFFICE O/P NEW SF 15 MIN: CPT | Performed by: SURGERY

## 2021-07-20 PROCEDURE — 1101F PT FALLS ASSESS-DOCD LE1/YR: CPT | Performed by: SURGERY

## 2021-07-20 PROCEDURE — G8419 CALC BMI OUT NRM PARAM NOF/U: HCPCS | Performed by: SURGERY

## 2021-07-20 PROCEDURE — G8510 SCR DEP NEG, NO PLAN REQD: HCPCS | Performed by: SURGERY

## 2021-07-20 PROCEDURE — G8753 SYS BP > OR = 140: HCPCS | Performed by: SURGERY

## 2021-07-20 PROCEDURE — 72072 X-RAY EXAM THORAC SPINE 3VWS: CPT

## 2021-07-20 PROCEDURE — G8427 DOCREV CUR MEDS BY ELIG CLIN: HCPCS | Performed by: SURGERY

## 2021-07-20 PROCEDURE — G8755 DIAS BP > OR = 90: HCPCS | Performed by: SURGERY

## 2021-07-20 PROCEDURE — 72100 X-RAY EXAM L-S SPINE 2/3 VWS: CPT

## 2021-07-20 PROCEDURE — G8536 NO DOC ELDER MAL SCRN: HCPCS | Performed by: SURGERY

## 2021-07-20 RX ORDER — GABAPENTIN 100 MG/1
100 CAPSULE ORAL 3 TIMES DAILY
Qty: 90 CAPSULE | Refills: 1 | Status: SHIPPED | OUTPATIENT
Start: 2021-07-20 | End: 2022-02-16

## 2021-07-20 NOTE — PROGRESS NOTES
Angel Suarez is a 82 y.o. male who is referred by Dr. Carla Hodge for further evaluation of left groin pain. Mr. Kevin Mosher tells me that he began experiencing pain in his left groin approximately six months ago. At that time, he \"threw out\" his back and subsequently developed pain in his left groin. According to Mr. Everett, the pain is becoming worse especially when he bends over, coughs or sneezes. No associated nausea or vomitting. Of note, Mr. Kevin Mohser is s/p Laparoscopic LIH repair in 2018 and has not noted a recurrent bulge in left groin. No right groin pain or associated right groin bulge. He has otherwise been in his usual state of health. Past Medical History:   Diagnosis Date    Benign hypertension     Chest pain, unspecified     Dyspnea     Hypertension     LBBB (left bundle branch block)     PAF (paroxysmal atrial fibrillation) (HCC)     Palpitations     Right groin pain 7/20/2021    Splenic infarct     splenic infarcts 12/18    Splenic vein thrombosis     12/18    Stroke ([facility] [address]     Brain MRI 7/3/19 - Small left pontine and left midbrain acute infarctions.  Vertigo      Past Surgical History:   Procedure Laterality Date    HX CHOLECYSTECTOMY  10/02/2020    Robotic-assisted laparoscopic with firefly.     HX TONSIL AND ADENOIDECTOMY       Family History   Problem Relation Age of Onset    Cancer Mother     Stroke Mother     Cancer Father      Social History     Socioeconomic History    Marital status:      Spouse name: Not on file    Number of children: Not on file    Years of education: Not on file    Highest education level: Not on file   Tobacco Use    Smoking status: Never Smoker    Smokeless tobacco: Never Used   Substance and Sexual Activity    Alcohol use: No    Drug use: Never     Social Determinants of Health     Financial Resource Strain:     Difficulty of Paying Living Expenses:    Food Insecurity:     Worried About Running Out of Food in the Last Year:     Noemí of Food in the Last Year:    Transportation Needs:     Lack of Transportation (Medical):  Lack of Transportation (Non-Medical):    Physical Activity:     Days of Exercise per Week:     Minutes of Exercise per Session:    Stress:     Feeling of Stress :    Social Connections:     Frequency of Communication with Friends and Family:     Frequency of Social Gatherings with Friends and Family:     Attends Sikhism Services:     Active Member of Clubs or Organizations:     Attends Club or Organization Meetings:     Marital Status:      Review of systems negative except as noted. Review of Systems   Constitutional: Negative for chills and fever. Respiratory: Negative for cough. Gastrointestinal: Positive for abdominal pain (Left groin. ). Negative for constipation, nausea and vomiting. Neurological: Positive for dizziness. Physical Exam  Vitals reviewed. Constitutional:       General: He is not in acute distress. Appearance: Normal appearance. He is normal weight. HENT:      Head: Normocephalic and atraumatic. Eyes:      General: No scleral icterus. Cardiovascular:      Rate and Rhythm: Normal rate and regular rhythm. Pulmonary:      Effort: Pulmonary effort is normal.      Breath sounds: Normal breath sounds. Abdominal:      General: There is no distension. Palpations: Abdomen is soft. Tenderness: There is no abdominal tenderness. There is no guarding or rebound. Hernia: No hernia is present. There is no hernia in the left inguinal area (No apparent direct or indirect recurrence.) or right inguinal area. Musculoskeletal:         General: Normal range of motion. Cervical back: Neck supple. Lymphadenopathy:      Cervical: No cervical adenopathy. Neurological:      General: No focal deficit present. Mental Status: He is alert. ASSESSMENT and PLAN  Mr. Tanja Ramirez is an 83 yo man with left groin pain following laparoscopic LIH repair in 2018. Reassured Mr. Everett that at this point in time, there is no apparent recurrent LIH. His symptoms may be due to nerve entrapment from his previous hernia repair. Will try Neurontin 100mg TID. Will also check lumbar and thoracic spine x-rays. Activity as tolerated for now. Will see in two more weeks or earlier if need be. Discussed plan with Mr. Floyd Rangel and he is agreeable.        CC: Vicki Nguyen MD

## 2021-07-28 ENCOUNTER — TELEPHONE (OUTPATIENT)
Dept: SURGERY | Age: 86
End: 2021-07-28

## 2021-07-30 NOTE — TELEPHONE ENCOUNTER
Patient called stating he hasn't received a call back with results and eager to know. Patient stated he wants to know his results today.      Patient stated call mobile if can't reach on home # first.

## 2021-08-03 PROBLEM — I10 BENIGN HYPERTENSION: Status: RESOLVED | Noted: 2021-08-03 | Resolved: 2021-08-03

## 2021-08-10 NOTE — TELEPHONE ENCOUNTER
Patient called in stating he had a back xray done. He states he has not heard back about the results.  He would like a copy of the results printed for him to  so he can take it to a specialist.   Annamarie Darbyet: [phone]

## 2021-08-10 NOTE — TELEPHONE ENCOUNTER
Reviewed xray results with Eric Barahona. Patient identified with two patient identifiers patient informed both xrays are normal.  Per patient he is being referred to a back specialist and would like to get a copy of report and disc. Patient informed I will send a message to medical records to help with whom he should contact. Patient expressed understanding.

## 2021-08-17 ENCOUNTER — TELEPHONE (OUTPATIENT)
Dept: SURGERY | Age: 86
End: 2021-08-17

## 2021-09-20 ENCOUNTER — OFFICE VISIT (OUTPATIENT)
Dept: CARDIOLOGY CLINIC | Age: 86
End: 2021-09-20
Payer: MEDICARE

## 2021-09-20 VITALS
RESPIRATION RATE: 16 BRPM | DIASTOLIC BLOOD PRESSURE: 72 MMHG | SYSTOLIC BLOOD PRESSURE: 110 MMHG | OXYGEN SATURATION: 98 % | HEIGHT: 72 IN | WEIGHT: 222 LBS | HEART RATE: 60 BPM | BODY MASS INDEX: 30.07 KG/M2

## 2021-09-20 DIAGNOSIS — I44.7 LBBB (LEFT BUNDLE BRANCH BLOCK): ICD-10-CM

## 2021-09-20 DIAGNOSIS — I63.9 CEREBROVASCULAR ACCIDENT (CVA), UNSPECIFIED MECHANISM (HCC): ICD-10-CM

## 2021-09-20 DIAGNOSIS — I48.0 PAF (PAROXYSMAL ATRIAL FIBRILLATION) (HCC): Primary | ICD-10-CM

## 2021-09-20 PROCEDURE — G8432 DEP SCR NOT DOC, RNG: HCPCS | Performed by: SPECIALIST

## 2021-09-20 PROCEDURE — 99214 OFFICE O/P EST MOD 30 MIN: CPT | Performed by: SPECIALIST

## 2021-09-20 PROCEDURE — G8419 CALC BMI OUT NRM PARAM NOF/U: HCPCS | Performed by: SPECIALIST

## 2021-09-20 PROCEDURE — 1101F PT FALLS ASSESS-DOCD LE1/YR: CPT | Performed by: SPECIALIST

## 2021-09-20 PROCEDURE — G8536 NO DOC ELDER MAL SCRN: HCPCS | Performed by: SPECIALIST

## 2021-09-20 PROCEDURE — G0463 HOSPITAL OUTPT CLINIC VISIT: HCPCS | Performed by: SPECIALIST

## 2021-09-20 PROCEDURE — G8427 DOCREV CUR MEDS BY ELIG CLIN: HCPCS | Performed by: SPECIALIST

## 2021-09-20 PROCEDURE — G8754 DIAS BP LESS 90: HCPCS | Performed by: SPECIALIST

## 2021-09-20 PROCEDURE — G8752 SYS BP LESS 140: HCPCS | Performed by: SPECIALIST

## 2021-09-20 RX ORDER — LOSARTAN POTASSIUM 25 MG/1
25 TABLET ORAL
Qty: 90 TABLET | Refills: 3 | Status: SHIPPED | OUTPATIENT
Start: 2021-09-20

## 2021-09-20 NOTE — PROGRESS NOTES
Chief Complaint   Patient presents with    Follow-up    Hypertension    Other     PAF, CVA     Visit Vitals  /72 (BP 1 Location: Left arm, BP Patient Position: Sitting, BP Cuff Size: Large adult)   Pulse 60   Resp 16   Ht 6' (1.829 m)   Wt 222 lb (100.7 kg)   SpO2 98%   BMI 30.11 kg/m²       Patient presents in office without complaint. No recent hospital visits. No refills needed at this time.

## 2021-09-20 NOTE — PROGRESS NOTES
Roman Augustin MD. [facility]              Patient: Yuval Levy  : 1935      Today's Date: 2021          HISTORY OF PRESENT ILLNESS:     History of Present Illness:  He is doing OK. BP looks good overall. No CP or SOB. PAST MEDICAL HISTORY:     Past Medical History:   Diagnosis Date    Benign hypertension     Chest pain, unspecified     Dyspnea     Hypertension     LBBB (left bundle branch block)     Left groin pain 2021    PAF (paroxysmal atrial fibrillation) (HCC)     Palpitations     Right groin pain 2021    Splenic infarct     splenic infarcts     Splenic vein thrombosis         Stroke ([facility] [address]     Brain MRI 7/3/19 - Small left pontine and left midbrain acute infarctions.  Vertigo          Past Surgical History:   Procedure Laterality Date    HX CHOLECYSTECTOMY  10/02/2020    Robotic-assisted laparoscopic with firefly.  HX TONSIL AND ADENOIDECTOMY             MEDICATIONS:     Current Outpatient Medications   Medication Sig Dispense Refill    gabapentin (NEURONTIN) 100 mg capsule Take 1 Capsule by mouth three (3) times daily. Max Daily Amount: 300 mg. 90 Capsule 1    tamsulosin (FLOMAX) 0.4 mg capsule Take 0.4 mg by mouth daily.  cholecalciferol (Vitamin D3) 25 mcg (1,000 unit) cap Take  by mouth daily.  triamterene-hydroCHLOROthiazide (DYAZIDE) 37.5-25 mg per capsule Take 1 Cap by mouth every Monday, Wednesday, Friday. 30 Cap 0    zinc 50 mg tab tablet Take  by mouth daily.  finasteride (PROSCAR) 5 mg tablet TAKE ONE TABLET BY MOUTH EVERY DAY      losartan (COZAAR) 50 mg tablet TAKE ONE TABLET (50MG) BY MOUTH DAILY      rosuvastatin (CRESTOR) 10 mg tablet Take 10 mg by mouth nightly.  co-enzyme Q-10 (CO Q-10) 100 mg capsule Take 100 mg by mouth daily.  apixaban (ELIQUIS) 5 mg tablet Take 1 Tab by mouth two (2) times a day.  Indications: CVA, ?PAF, recommended by cardiology and neurology 60 Tab 0    ascorbic acid, vitamin C, (VITAMIN C) 500 mg tablet Take 500 mg by mouth daily. Allergies   Allergen Reactions    Beta-Blockers (Beta-Adrenergic Blocking Agts) Other (comments)     \"It gives me a heart attack\", per pt med causes chest to swell and chest pain. 7/3/19 - pt, spouse and child deny this allergy he has been taking propranolol since Feb 2019 without issue    Pcn [Penicillins] Unknown (comments)           SOCIAL HISTORY:     Social History     Tobacco Use    Smoking status: Never Smoker    Smokeless tobacco: Never Used   Substance Use Topics    Alcohol use: No    Drug use: Never         FAMILY HISTORY:     Family History   Problem Relation Age of Onset    Cancer Mother     Stroke Mother     Cancer Father             REVIEW OF SYMPTOMS:      Review of Symptoms:  Constitutional: Negative for fever, chills  HEENT: Negative for nosebleeds, tinnitus, and vision changes. Respiratory: Negative for cough, wheezing  Cardiovascular: Negative for orthopnea, claudication, syncope, and PND. Gastrointestinal: Negative for abdominal pain, diarrhea, melena. Genitourinary: Negative for dysuria  Musculoskeletal: Negative for myalgias. Skin: Negative for rash  Heme: No problems bleeding. Neurological: Negative for speech change and focal weakness.   + poor balance, vertigo         PHYSICAL EXAM:      Physical Exam:  Visit Vitals  /72 (BP 1 Location: Left arm, BP Patient Position: Sitting, BP Cuff Size: Large adult)   Pulse 60   Resp 16   Ht 6' (1.829 m)   Wt 222 lb (100.7 kg)   SpO2 98%   BMI 30.11 kg/m²       Patient appears generally well, mood and affect are appropriate and pleasant. HEENT:  Hearing intact, non-icteric, normocephalic, atraumatic. Neck Exam: Supple, No JVD or carotid bruits. Lung Exam: Clear to auscultation, even breath sounds. Cardiac Exam: Regular rate and rhythm with no murmur or rub  Abdomen: Soft, non-tender, normal bowel sounds. No bruits or masses.   Extremities: Moves all ext well. No lower extremity edema. Vascular: 2+ dorsalis pedis pulses bilaterally. Psych: Appropriate affect  Neuro - Grossly intact        LABS / OTHER STUDIES:      Lab Results   Component Value Date/Time    Sodium 140 10/06/2020 08:26 AM    Potassium 3.2 (L) 10/06/2020 08:26 AM    Chloride 107 10/06/2020 08:26 AM    CO2 25 10/06/2020 08:26 AM    Anion gap 8 10/06/2020 08:26 AM    Glucose 108 (H) 10/06/2020 08:26 AM    BUN 22 (H) 10/06/2020 08:26 AM    Creatinine 1.23 10/06/2020 08:26 AM    BUN/Creatinine ratio 18 10/06/2020 08:26 AM    GFR est AA >60 10/06/2020 08:26 AM    GFR est non-AA 56 (L) 10/06/2020 08:26 AM    Calcium 8.5 10/06/2020 08:26 AM    Bilirubin, total 0.9 10/06/2020 08:26 AM    Alk.  phosphatase 71 10/06/2020 08:26 AM    Protein, total 5.9 (L) 10/06/2020 08:26 AM    Albumin 2.7 (L) 10/06/2020 08:26 AM    Globulin 3.2 10/06/2020 08:26 AM    A-G Ratio 0.8 (L) 10/06/2020 08:26 AM    ALT (SGPT) 35 10/06/2020 08:26 AM    AST (SGOT) 37 10/06/2020 08:26 AM     Lab Results   Component Value Date/Time    WBC 8.9 10/06/2020 08:26 AM    HGB 16.1 10/06/2020 08:26 AM    HCT 45.8 10/06/2020 08:26 AM    PLATELET 273 55/18/2070 08:26 AM    MCV 93.7 10/06/2020 08:26 AM     Lab Results   Component Value Date/Time    Cholesterol, total 118 07/04/2019 07:32 AM    HDL Cholesterol 45 07/04/2019 07:32 AM    LDL, calculated 57.8 07/04/2019 07:32 AM    VLDL, calculated 15.2 07/04/2019 07:32 AM    Triglyceride 76 07/04/2019 07:32 AM    CHOL/HDL Ratio 2.6 07/04/2019 07:32 AM       Lab Results   Component Value Date/Time    TSH 2.720 12/09/2020 02:09 PM     Labs 7/8/21 - CBC OK, BMP OK, chol 121, TG 93, LDL 49, HDL 54, LFT's OK, TSH 2.7, A1c 5.6         CARDIAC DIAGNOSTICS:      Cardiac Evaluation Includes:        Echo 4/9/15 - LVEF 40% (EKG showed a LBBB)   Echo 6/22/16 - LVEF 50%  Holter 12/28/18 - normal study   Echo 12/31/18 - LVEF 45%  CTA Abd 12/11/18 - Geographic areas of low attenuation in the spleen suggesting infarcts. Echo 12/18 - LVEF 55-60%  CTA 4/8/19 Idella Satchel of scarring are noted in the spleen compatible with healing infarcts.  Evidence  of chronic splenic vein thrombosis.  Marked prostate enlargement with nodule. Echo 7/4/19 - LVEF 55%, negative bubble study   RICKY 7/5/19 - LVEF 60%  Brain MRI 7/3/19 - Small left pontine and left midbrain acute infarctions. Coronary CTA 11/6/19 - CAC Score 70.  Mild disease.             EKG 12/11/18 - NSR, LBBB  EKG 8/6/20 - NSR, LBBB  EKG 9/30/20 - sinus alfonso, LBBB  EKG 10/2/20 - Afib, LBBB  - I viewed myself   EKG 1/11/21 - TDS, Probably NSR, PAC's  (vs possible atrial flutter)            ASSESSMENT AND PLAN:      Assessment and Plan:  1) Paroxysmal Afib   - PAF discovered 10/2/20   - He may be having PAF spells at home (based on Kardia readings) - however HR is OK at home   - Continue [facility]  - I discussed checking a 30 day monitor to see if there are any symptoms associated with Afib -- I don't think his vertigo is related to PAF - he is not interested in repeating a monitor at this time   - Will follow clinically   - recheck an echo next year     2) CVA  - Acute CVA 7/19 - Small left pontine and left midbrain acute infarctions  - started [address] then since were worried about an embolic event   - PAF discovered 10/2/20   - Has poor balance since his stroke but still able to stay active   - cont statin (he cuts dose in half)      3) Splenic Vein Thrombosis 12/18  - saw Dr. Lizzy Lanier      4) Mild atherosclerosis on Coronary CTA and dyslipidemia   - cont statin   - lipids followed by PCP      5) HTN  - BP a little low at times ( for example) ---> he feels weak then ---> will try losartan at 25 mg daily      6) Vertigo   -Clista Prime has had poor balance since 12/18 (splenic infarcts) - has seen ENT for this and treated for BPPV but problems persisted.      7) See me in 12 months.   Patient expressed understanding of the plan - questions were answered.      Retired .  Has 4 kids.         Helio Blackburn MD, [address]  [address], [address]      [address] [facility]  [address]  [facility], [address] [place] [address]  Ph: [phone]                                [phone]

## 2022-02-16 ENCOUNTER — OFFICE VISIT (OUTPATIENT)
Dept: CARDIOLOGY CLINIC | Age: 87
End: 2022-02-16
Payer: MEDICARE

## 2022-02-16 VITALS
HEART RATE: 68 BPM | DIASTOLIC BLOOD PRESSURE: 70 MMHG | BODY MASS INDEX: 29.53 KG/M2 | HEIGHT: 72 IN | SYSTOLIC BLOOD PRESSURE: 138 MMHG | WEIGHT: 218 LBS | OXYGEN SATURATION: 98 %

## 2022-02-16 DIAGNOSIS — I63.9 CEREBROVASCULAR ACCIDENT (CVA), UNSPECIFIED MECHANISM (HCC): ICD-10-CM

## 2022-02-16 DIAGNOSIS — I48.0 PAF (PAROXYSMAL ATRIAL FIBRILLATION) (HCC): Primary | ICD-10-CM

## 2022-02-16 DIAGNOSIS — I44.7 LBBB (LEFT BUNDLE BRANCH BLOCK): ICD-10-CM

## 2022-02-16 DIAGNOSIS — R06.09 DOE (DYSPNEA ON EXERTION): ICD-10-CM

## 2022-02-16 PROCEDURE — G8427 DOCREV CUR MEDS BY ELIG CLIN: HCPCS | Performed by: SPECIALIST

## 2022-02-16 PROCEDURE — 93005 ELECTROCARDIOGRAM TRACING: CPT | Performed by: SPECIALIST

## 2022-02-16 PROCEDURE — G8754 DIAS BP LESS 90: HCPCS | Performed by: SPECIALIST

## 2022-02-16 PROCEDURE — 93010 ELECTROCARDIOGRAM REPORT: CPT | Performed by: SPECIALIST

## 2022-02-16 PROCEDURE — G8417 CALC BMI ABV UP PARAM F/U: HCPCS | Performed by: SPECIALIST

## 2022-02-16 PROCEDURE — G8536 NO DOC ELDER MAL SCRN: HCPCS | Performed by: SPECIALIST

## 2022-02-16 PROCEDURE — 1101F PT FALLS ASSESS-DOCD LE1/YR: CPT | Performed by: SPECIALIST

## 2022-02-16 PROCEDURE — G8752 SYS BP LESS 140: HCPCS | Performed by: SPECIALIST

## 2022-02-16 PROCEDURE — G8432 DEP SCR NOT DOC, RNG: HCPCS | Performed by: SPECIALIST

## 2022-02-16 PROCEDURE — G0463 HOSPITAL OUTPT CLINIC VISIT: HCPCS | Performed by: SPECIALIST

## 2022-02-16 PROCEDURE — 99214 OFFICE O/P EST MOD 30 MIN: CPT | Performed by: SPECIALIST

## 2022-02-16 NOTE — PROGRESS NOTES
Orders for Echo soon. See me in one year with an echo then as well    per Dr. Irina Neville VO.   Dx: paf, cva, athero, bedolla

## 2022-02-16 NOTE — PROGRESS NOTES
Jo-Ann Montague MD. [facility]              Patient: Kendrick Padilla  : 1935      Today's Date: 2022          HISTORY OF PRESENT ILLNESS:     History of Present Illness:  He is doing OK. BP looks good overall. No CP. Has chronic OBREGON. Has chronic problems with balance - no falls. PAST MEDICAL HISTORY:     Past Medical History:   Diagnosis Date    Benign hypertension     Chest pain, unspecified     Dyspnea     Hypertension     LBBB (left bundle branch block)     Left groin pain 2021    [facility] fibrillation) ([facility])     Palpitations     Right groin pain 2021    Splenic infarct     splenic infarcts     Splenic vein thrombosis         Stroke ([facility] [address]     Brain MRI 7/3/19 - Small left pontine and left midbrain acute infarctions.  Vertigo          Past Surgical History:   Procedure Laterality Date    HX CHOLECYSTECTOMY  10/02/2020    Robotic-assisted laparoscopic with firefly.  HX TONSIL AND ADENOIDECTOMY             MEDICATIONS:     Current Outpatient Medications   Medication Sig Dispense Refill    losartan (COZAAR) 25 mg tablet Take 1 Tablet by mouth nightly. 90 Tablet 3    tamsulosin (FLOMAX) 0.4 mg capsule Take 0.4 mg by mouth two (2) times a day.  cholecalciferol (Vitamin D3) 25 mcg (1,000 unit) cap Take  by mouth daily.  triamterene-hydroCHLOROthiazide (DYAZIDE) 37.5-25 mg per capsule Take 1 Cap by mouth every Monday, Wednesday, Friday. 30 Cap 0    zinc 50 mg tab tablet Take  by mouth daily.  finasteride (PROSCAR) 5 mg tablet TAKE ONE TABLET BY MOUTH EVERY DAY      rosuvastatin (CRESTOR) 10 mg tablet Take 10 mg by mouth nightly.  co-enzyme Q-10 (CO Q-10) 100 mg capsule Take 100 mg by mouth daily.  apixaban (ELIQUIS) 5 mg tablet Take 1 Tab by mouth two (2) times a day.  Indications: CVA, ?PAF, recommended by cardiology and neurology 60 Tab 0    ascorbic acid, vitamin C, (VITAMIN C) 500 mg tablet Take 500 mg by mouth daily. Allergies   Allergen Reactions    Beta-Blockers (Beta-Adrenergic Blocking Agts) Other (comments)     \"It gives me a heart attack\", per pt med causes chest to swell and chest pain. 7/3/19 - pt, spouse and child deny this allergy he has been taking propranolol since Feb 2019 without issue    Pcn [Penicillins] Unknown (comments)           SOCIAL HISTORY:     Social History     Tobacco Use    Smoking status: Never Smoker    Smokeless tobacco: Never Used   Substance Use Topics    Alcohol use: No    Drug use: Never         FAMILY HISTORY:     Family History   Problem Relation Age of Onset    Cancer Mother     Stroke Mother     Cancer Father             REVIEW OF SYMPTOMS:      Review of Symptoms:  Constitutional: Negative for fever, chills  HEENT: Negative for nosebleeds, tinnitus, and vision changes. Respiratory: Negative for cough, wheezing  Cardiovascular: Negative for orthopnea, claudication, syncope, and PND. Gastrointestinal: Negative for abdominal pain, diarrhea, melena. Genitourinary: Negative for dysuria  Musculoskeletal: Negative for myalgias. Skin: Negative for rash  Heme: No problems bleeding. Neurological: Negative for speech change and focal weakness.   + poor balance, vertigo         PHYSICAL EXAM:      Physical Exam:  Visit Vitals  /70 (BP 1 Location: Left upper arm, BP Patient Position: Sitting, BP Cuff Size: Adult)   Pulse 68   Ht 6' (1.829 m)   Wt 218 lb (98.9 kg)   SpO2 98%   BMI 29.57 kg/m²       Patient appears generally well, mood and affect are appropriate and pleasant. HEENT:  Hearing intact, non-icteric, normocephalic, atraumatic. Neck Exam: Supple, No JVD or carotid bruits. Lung Exam: Clear to auscultation, even breath sounds. Cardiac Exam: Regular rate and rhythm with no murmur or rub  Abdomen: Soft, non-tender, normal bowel sounds. No bruits or masses. Extremities: Moves all ext well. No lower extremity edema.   Vascular: 2+ dorsalis pedis pulses bilaterally. Psych: Appropriate affect  Neuro - Grossly intact        LABS / OTHER STUDIES:      Lab Results   Component Value Date/Time    Sodium 140 10/06/2020 08:26 AM    Potassium 3.2 (L) 10/06/2020 08:26 AM    Chloride 107 10/06/2020 08:26 AM    CO2 25 10/06/2020 08:26 AM    Anion gap 8 10/06/2020 08:26 AM    Glucose 108 (H) 10/06/2020 08:26 AM    BUN 22 (H) 10/06/2020 08:26 AM    Creatinine 1.23 10/06/2020 08:26 AM    BUN/Creatinine ratio 18 10/06/2020 08:26 AM    GFR est AA >60 10/06/2020 08:26 AM    GFR est non-AA 56 (L) 10/06/2020 08:26 AM    Calcium 8.5 10/06/2020 08:26 AM    Bilirubin, total 0.9 10/06/2020 08:26 AM    Alk. phosphatase 71 10/06/2020 08:26 AM    Protein, total 5.9 (L) 10/06/2020 08:26 AM    Albumin 2.7 (L) 10/06/2020 08:26 AM    Globulin 3.2 10/06/2020 08:26 AM    A-G Ratio 0.8 (L) 10/06/2020 08:26 AM    ALT (SGPT) 35 10/06/2020 08:26 AM    AST (SGOT) 37 10/06/2020 08:26 AM     Lab Results   Component Value Date/Time    WBC 8.9 10/06/2020 08:26 AM    HGB 16.1 10/06/2020 08:26 AM    HCT 45.8 10/06/2020 08:26 AM    PLATELET 715 15/56/7124 08:26 AM    MCV 93.7 10/06/2020 08:26 AM     Lab Results   Component Value Date/Time    Cholesterol, total 118 07/04/2019 07:32 AM    HDL Cholesterol 45 07/04/2019 07:32 AM    LDL, calculated 57.8 07/04/2019 07:32 AM    VLDL, calculated 15.2 07/04/2019 07:32 AM    Triglyceride 76 07/04/2019 07:32 AM    CHOL/HDL Ratio 2.6 07/04/2019 07:32 AM       Lab Results   Component Value Date/Time    TSH 2.720 12/09/2020 02:09 PM     Labs 7/8/21 - CBC OK, BMP OK, chol 121, TG 93, LDL 49, HDL 54, LFT's OK, TSH 2.7, A1c 5.6         CARDIAC DIAGNOSTICS:      Cardiac Evaluation Includes:        Echo 4/9/15 - LVEF 40% (EKG showed a LBBB)   Echo 6/22/16 - LVEF 50%  Holter 12/28/18 - normal study   Echo 12/31/18 - LVEF 45%  CTA Abd 12/11/18 - Geographic areas of low attenuation in the spleen suggesting infarcts.   Echo 12/18 - LVEF 55-60%  CTA 4/8/19 Lacy Joe of scarring are noted in the spleen compatible with healing infarcts.  Evidence  of chronic splenic vein thrombosis.  Marked prostate enlargement with nodule. Echo 7/4/19 - LVEF 55%, negative bubble study   RICKY 7/5/19 - LVEF 60%  Brain MRI 7/3/19 - Small left pontine and left midbrain acute infarctions. Coronary CTA 11/6/19 - CAC Score 70.  Mild disease.             EKG 12/11/18 - NSR, LBBB  EKG 8/6/20 - NSR, LBBB  EKG 9/30/20 - sinus alfonso, LBBB  EKG 10/2/20 - Afib, LBBB  - I viewed myself   EKG 1/11/21 - TDS, Probably NSR, PAC's  (vs possible atrial flutter)   EKG 4/12/21 - NSR, LBBB  EKG 2/16/22 - NSR, LBBB         ASSESSMENT AND PLAN:      Assessment and Plan:  1) Paroxysmal Afib   - PAF discovered 10/2/20   - No recent obvious Afib spells   - cont OAC    2) CVA and PAF  - Acute CVA 7/19 - Small left pontine and left midbrain acute infarctions  - [place] [facility] [place] since were worried about an embolic event   - PAF discovered 10/2/20   - Has poor balance since his stroke but still able to stay active   - cont statin      3) Splenic Vein Thrombosis 12/18  - saw Dr. Viviana Ryan      4) Mild atherosclerosis on Coronary CTA and dyslipidemia   - cont statin   - lipids followed by PCP      5) HTN  - BP OK overall - cont meds      6) Vertigo   -Jasmin Mccray has had poor balance since 12/18 (splenic infarcts) - has seen ENT for this and treated for BPPV but problems persisted. 7) LBBB and chronic OBREGON  - check an echo periodically      8) Echo soon. See me in 12 months with an echo then.   Patient expressed understanding of the plan - questions were answered.      Retired . [address] [facility] 4 kids.         Pavel Trujillo MD, [address]  [address], [address]      [id number]  [address] [place] [address], [address] [place] [address]  Ph: [phone]                                [phone]

## 2022-02-16 NOTE — PROGRESS NOTES
Lilyan Felty is a 82 y.o. male    Visit Vitals  /70 (BP 1 Location: Left upper arm, BP Patient Position: Sitting, BP Cuff Size: Adult)   Pulse 68   Ht 6' (1.829 m)   Wt 218 lb (98.9 kg)   SpO2 98%   BMI 29.57 kg/m²       Chief Complaint   Patient presents with    Irregular Heart Beat     PAF    Other     CVA    Other     LBBB       Chest pain NO  SOB NO  Dizziness NO  Swelling NO  Recent hospital visit NO  Refills NO  COVID VACCINE STATUS NO  HAD COVID?  NO

## 2022-03-18 PROBLEM — R20.0 RIGHT ARM NUMBNESS: Status: ACTIVE | Noted: 2019-07-03

## 2022-03-18 PROBLEM — N40.0 PROSTATE ENLARGEMENT: Status: ACTIVE | Noted: 2018-12-11

## 2022-03-18 PROBLEM — I63.9 LEFT PONTINE CVA (HCC): Status: ACTIVE | Noted: 2019-07-04

## 2022-03-19 PROBLEM — Z90.49 S/P LAPAROSCOPIC CHOLECYSTECTOMY: Status: ACTIVE | Noted: 2020-10-03

## 2022-03-19 PROBLEM — R42 DIZZINESS: Status: ACTIVE | Noted: 2019-07-03

## 2022-03-19 PROBLEM — H53.8 BLURRY VISION: Status: ACTIVE | Noted: 2019-07-03

## 2022-03-20 PROBLEM — R10.32 LEFT GROIN PAIN: Status: ACTIVE | Noted: 2021-07-20

## 2023-01-11 NOTE — ED PROVIDER NOTES
2:35 PM  I have evaluated the patient as the Provider in Triage. I have reviewed His vital signs and the triage nurse assessment. I have talked with the patient and any available family and advised that I am the provider in triage and have ordered the appropriate study to initiate their work up based on the clinical presentation during my assessment. I have advised that the patient will be accommodated in the Main ED as soon as possible. I have also requested to contact the triage nurse or myself immediately if the patient experiences any changes in their condition during this brief waiting period. Macy Stephen MD    82 y.o. male with past medical history significant for dyspnea, HTN, palpitations, and LBBB who presents from home with chief complaint of difficulty with balance. Patient reports that he woke this morning feeling odd, described as difficulty with balance. He says that his head feels \"all fuzzed up\" and that when he tries to stand up he will fall over. He says that his head is taking him in different directions from where he wants to go. He denies any weakness or dizziness, saying that it is balance that is off. He notes that he is on blood thinners (eliquis) which he recently started because of a blood clot in his spleen. He denies any headache. There are no other acute medical concerns at this time. Social hx: Denies EtOH  PCP: Hawk Russ MD    Note written by Alpesh Hudson Aas, as dictated by Richa Beauchamp MD 2:35 PM    82 y.o. male with past medical history significant for dyspnea, HTN, palpitations, and LBBB who presents from home with chief complaint of hypertension. Pt reports hypertension for the past 2 days. Pt also c/o unsteadiness. Pt notes his BP was 177/95 last night. Pt reports his BP is usually in \"130's /70's\". Pt states his BP has been going up and down this past month.  Pt notes he is walking with a cane today, which is not normal. Pt was seen in the ED 2 days ago Acute Care - Occupational Therapy Treatment Note   [place]     Patient Name: Tre Kinney  : 1930  MRN: [id number]  Today's Date: 2023  Onset of Illness/Injury or Date of Surgery: 10/06/22     Referring Physician: Janneth    Admit Date: 10/5/2022     No diagnosis found.  [id number]  Patient Active Problem List   Diagnosis   • Acid reflux   • Ankle arthralgia   • Cardiac conduction disorder   • Closed fracture of distal fibula   • Arteriosclerosis of coronary artery   • Fracture of distal end of tibia   • Elevated cholesterol   • BP (high blood pressure)   • Hypertrophic polyarthritis   • OP (osteoporosis)   • Right heart failure (HCC)   • Heart failure, chronic, right-sided (HCC)   • SOB (shortness of breath)   • Palpitations   • CHF (congestive heart failure) (HCC)   • Congestive heart failure (HCC)   • Precordial pain   • Chronic anticoagulation   • Hypokalemia   • Anemia, unspecified type   • Generalized weakness     Past Medical History:   Diagnosis Date   • Anemia    • Arthritis    • CHF (congestive heart failure) ([facility])    • Chronic kidney disease    • Coronary artery disease    • H/O blood clots    • History of transfusion     no reaction    • Hypertension      Past Surgical History:   Procedure Laterality Date   • ABDOMINAL SURGERY     • APPENDECTOMY     • CARDIAC SURGERY     •  SECTION     • COLONOSCOPY N/A 3/7/2018    Procedure: COLONOSCOPY;  Surgeon: Willie Gresham MD;  Location:  [facility];  Service:    • CORONARY ANGIOPLASTY WITH STENT PLACEMENT     • ENDOSCOPY N/A 3/6/2018    Procedure: ESOPHAGOGASTRODUODENOSCOPY;  Surgeon: Willie Gresham MD;  Location:  [facility];  Service:    • PACEMAKER IMPLANTATION      left side    • SKIN BIOPSY     • TUBAL ABDOMINAL LIGATION           OT ASSESSMENT FLOWSHEET (last 12 hours)     OT Evaluation and Treatment     Row Name 23 1520                   OT Time and Intention    Subjective Information no complaints  -LM         Document Type therapy note (daily note)  -LM        Mode of Treatment occupational therapy  -LM        Patient Effort good  -LM        Comment Patient seen this date for light TA at bedside chair.  yellow tband and light towel therex.  BUE shoulder flx/ext, elbow flx/ext, hor abd/add.  Frequent rest breaks.  -LM           General Information    Existing Precautions/Restrictions fall  -LM           Cognition    Affect/Mental Status (Cognition) WFL  -LM        Orientation Status (Cognition) oriented x 4  -LM           Positioning and Restraints    Post Treatment Position chair  -LM        In Chair call light within reach;encouraged to call for assist  -LM              User Key  (r) = Recorded By, (t) = Taken By, (c) = Cosigned By    Initials Name Effective Dates    LM Татьяна Bah, OT 06/16/21 -                        OT Recommendation and Plan           Outcome Measures     Row Name 01/11/23 1500             How much help from another is currently needed...    Putting on and taking off regular lower body clothing? 3  -LM      Bathing (including washing, rinsing, and drying) 3  -LM      Toileting (which includes using toilet bed pan or urinal) 3  -LM      Putting on and taking off regular upper body clothing 3  -LM      Taking care of personal grooming (such as brushing teeth) 3  -LM      Eating meals 4  -LM      AM-PAC 6 Clicks Score (OT) 19  -LM         Functional Assessment    Outcome Measure Options AM-PAC 6 Clicks Daily Activity (OT)  -LM            User Key  (r) = Recorded By, (t) = Taken By, (c) = Cosigned By    Initials Name Provider Type    LM Татьяна Bah, OT Occupational Therapist                Time Calculation:    Time Calculation- OT     Row Name 01/11/23 1525             Time Calculation- OT    Total Timed Code Minutes- OT 15 minute(s)  -LM            User Key  (r) = Recorded By, (t) = Taken By, (c) = Cosigned By    Initials Name Provider Type    LM Татьяна Bah, OT Occupational Therapist           with similar symptoms. Pt is taking losartan-hydrocholorothiazide and hydralazine. Pt is taking eliquis. Pt notes he has an appointment with Zorita Hodgkin, MD on Friday. There are no other acute medical concerns at this time. PCP: Leopold Hoguet, MD    Note written by Alpesh Ford, as dictated by Anuradha Sinclair MD 5:21 PM        The history is provided by the patient. No  was used. Past Medical History:   Diagnosis Date    Benign hypertension     Chest pain, unspecified     Dyspnea     Hypertension     LBBB (left bundle branch block)     Palpitations        Past Surgical History:   Procedure Laterality Date    HX TONSIL AND ADENOIDECTOMY           No family history on file. Social History     Socioeconomic History    Marital status:      Spouse name: Not on file    Number of children: Not on file    Years of education: Not on file    Highest education level: Not on file   Social Needs    Financial resource strain: Not on file    Food insecurity - worry: Not on file    Food insecurity - inability: Not on file    Transportation needs - medical: Not on file   [employer] needs - non-medical: Not on file   Occupational History    Not on file   Tobacco Use    Smoking status: Never Smoker   Substance and Sexual Activity    Alcohol use: No    Drug use: Not on file    Sexual activity: Not on file   Other Topics Concern    Not on file   Social History Narrative    Not on file         ALLERGIES: Beta blocker [beta-blockers (beta-adrenergic blocking agts)] and Pcn [penicillins]    Review of Systems   Constitutional: Negative for fever. Eyes: Negative for visual disturbance. Respiratory: Negative for cough, shortness of breath and wheezing. Cardiovascular: Negative for chest pain and leg swelling. Gastrointestinal: Negative for abdominal pain, diarrhea, nausea and vomiting. Genitourinary: Negative for dysuria. Musculoskeletal: Negative.     Therapy Charges for Today     Code Description Service Date Service Provider Modifiers Qty    [id number]  OT THER PROC EA 15 MIN 1/11/2023 Татьяна Bah, OT GO 1               Татьяна Bah OT  1/11/2023   Negative for back pain and neck stiffness. Skin: Negative for rash. Neurological: Negative. Negative for dizziness, syncope, weakness and headaches. Difficulty balancing   Psychiatric/Behavioral: Negative for confusion. All other systems reviewed and are negative. There were no vitals filed for this visit. Physical Exam   Constitutional: He appears well-developed. No distress. HENT:   Head: Normocephalic and atraumatic. Eyes: Pupils are equal, round, and reactive to light. No scleral icterus. Neck: Normal range of motion. Neck supple. Cardiovascular: Normal rate and regular rhythm. Pulmonary/Chest: Effort normal and breath sounds normal.   Abdominal: Soft. He exhibits no distension. There is no tenderness. There is no rebound and no guarding. Musculoskeletal: Normal range of motion. Neurological: He is alert. Coordination and gait normal.   Normal gait. Normal coordination. Skin: Skin is warm and dry. He is not diaphoretic. Psychiatric: He has a normal mood and affect. His behavior is normal. Thought content normal.   Nursing note and vitals reviewed. Note written by Alpesh Smallwood, as dictated by Joe Ramirez MD 5:21 PM      MDM  Number of Diagnoses or Management Options  Hypertension, unspecified type: established and worsening  Lightheaded: new and requires workup  Diagnosis management comments: The patient is resting comfortably and feels better, is alert, talkative, interactive and in no distress. The repeat examination is unremarkable and benign. The patient is neurologically intact, has a normal mental status and is ambulatory in the ED.  The history, exam, diagnostic testing (if any) and the patient's current condition do not suggest arrhythmia, STEMI, seizure, meningitis, stroke, sepsis, subarachnoid hemorrhage, intracranial bleeding, encephalitis or other significant pathology that would warrant further testing, continued ED treatment, admission, neurological consultation, or other specialist evaluation at this point. The vital signs have been stable. The patient's condition is stable and appropriate for discharge. The patient will pursue further outpatient evaluation with the primary care physician or other designated or consulting physician as indicated in the discharge instructions. Procedures      CONSULT NOTE:  6:18 PM Joe Ramirez MD spoke with Dr. Anai Rainey, Consult for Cardiologist.  Discussed available diagnostic tests and clinical findings. Dr. Anai Rainey recommends doubling Pt's dose of Hyzaar. PROGRESS NOTE:  6:20 PM  Will discharge Pt. ED EKG interpretation:  Rhythm: normal sinus rhythm; and regular . Rate (approx.): 65 BPM; Left bundle branch block; ST/T wave: No ST changes; No ectopy. Note written by Alpesh Smallwood, as dictated by Joe Ramirez MD 6:24 PM      The patient's results have been reviewed with them and/or available family. Patient and/or family verbally conveyed their understanding and agreement of the patient's signs, symptoms, diagnosis, treatment and prognosis and additionally agree to follow up as recommended in the discharge instructions or to return to the Emergency Room should their condition change prior to their follow-up appointment. The patient/family verbally agrees with the care-plan and verbally conveys that all of their questions have been answered. The discharge instructions have also been provided to the patient and/or family with some educational information regarding the patient's diagnosis as well a list of reasons why the patient would want to return to the ER prior to their follow-up appointment, should their condition change.

## 2023-05-15 ENCOUNTER — APPOINTMENT (OUTPATIENT)
Facility: HOSPITAL | Age: 88
End: 2023-05-15
Payer: MEDICARE

## 2023-05-15 ENCOUNTER — HOSPITAL ENCOUNTER (EMERGENCY)
Facility: HOSPITAL | Age: 88
Discharge: HOME | End: 2023-05-15
Attending: EMERGENCY MEDICINE
Payer: MEDICARE

## 2023-05-15 VITALS
WEIGHT: 224.2 LBS | OXYGEN SATURATION: 95 % | HEIGHT: 72 IN | RESPIRATION RATE: 16 BRPM | DIASTOLIC BLOOD PRESSURE: 90 MMHG | TEMPERATURE: 97.6 F | SYSTOLIC BLOOD PRESSURE: 157 MMHG | BODY MASS INDEX: 30.37 KG/M2 | HEART RATE: 56 BPM

## 2023-05-15 DIAGNOSIS — M54.50 ACUTE MIDLINE LOW BACK PAIN WITHOUT SCIATICA: Primary | ICD-10-CM

## 2023-05-15 DIAGNOSIS — R42 VERTIGO: ICD-10-CM

## 2023-05-15 LAB
ALBUMIN SERPL-MCNC: 4.1 G/DL (ref 3.5–5.2)
ALBUMIN/GLOB SERPL: 1.6 (ref 1.1–2.2)
ALP SERPL-CCNC: 95 U/L (ref 40–129)
ALT SERPL-CCNC: 16 U/L (ref 10–50)
ANION GAP SERPL CALC-SCNC: 9 MMOL/L (ref 5–15)
AST SERPL-CCNC: 25 U/L (ref 10–50)
BASOPHILS # BLD: 0.1 K/UL (ref 0–1)
BASOPHILS NFR BLD: 1 % (ref 0–1)
BILIRUB SERPL-MCNC: 0.7 MG/DL (ref 0.2–1)
BUN SERPL-MCNC: 19 MG/DL (ref 8–23)
BUN/CREAT SERPL: 17 (ref 12–20)
CALCIUM SERPL-MCNC: 8.8 MG/DL (ref 8.8–10.2)
CHLORIDE SERPL-SCNC: 105 MMOL/L (ref 98–107)
CO2 SERPL-SCNC: 26 MMOL/L (ref 22–29)
COMMENT:: NORMAL
CREAT SERPL-MCNC: 1.15 MG/DL (ref 0.7–1.2)
DIFFERENTIAL METHOD BLD: NORMAL
EOSINOPHIL # BLD: 0.1 K/UL (ref 0–0.4)
EOSINOPHIL NFR BLD: 2 % (ref 0–7)
ERYTHROCYTE [DISTWIDTH] IN BLOOD BY AUTOMATED COUNT: 13 % (ref 11.5–14.5)
GLOBULIN SER CALC-MCNC: 2.6 G/DL (ref 2–4)
GLUCOSE SERPL-MCNC: 117 MG/DL (ref 65–100)
HCT VFR BLD AUTO: 47.1 % (ref 36.6–50.3)
HGB BLD-MCNC: 16.3 G/DL (ref 12.1–17)
IMM GRANULOCYTES # BLD AUTO: 0 K/UL (ref 0–0.04)
IMM GRANULOCYTES NFR BLD AUTO: 0 % (ref 0–0.5)
LYMPHOCYTES # BLD: 1.7 K/UL (ref 0.8–3.5)
LYMPHOCYTES NFR BLD: 27 % (ref 12–49)
MCH RBC QN AUTO: 33.1 PG (ref 26–34)
MCHC RBC AUTO-ENTMCNC: 34.6 G/DL (ref 30–36.5)
MCV RBC AUTO: 95.7 FL (ref 80–99)
MONOCYTES # BLD: 0.5 K/UL (ref 0–1)
MONOCYTES NFR BLD: 8 % (ref 5–13)
NEUTS SEG # BLD: 4 K/UL (ref 1.8–8)
NEUTS SEG NFR BLD: 62 % (ref 32–75)
NRBC # BLD: 0 K/UL (ref 0–0.01)
NRBC BLD-RTO: 0 PER 100 WBC
PLATELET # BLD AUTO: 163 K/UL (ref 150–400)
PMV BLD AUTO: 9.6 FL (ref 8.9–12.9)
POTASSIUM SERPL-SCNC: 4.4 MMOL/L (ref 3.5–5.1)
PROT SERPL-MCNC: 6.7 G/DL (ref 6.4–8.3)
RBC # BLD AUTO: 4.92 M/UL (ref 4.1–5.7)
SODIUM SERPL-SCNC: 140 MMOL/L (ref 136–145)
SPECIMEN HOLD: NORMAL
TROPONIN I BLD-MCNC: <0.04 NG/ML (ref 0–0.08)
WBC # BLD AUTO: 6.4 K/UL (ref 4.1–11.1)

## 2023-05-15 PROCEDURE — 6360000002 HC RX W HCPCS: Performed by: EMERGENCY MEDICINE

## 2023-05-15 PROCEDURE — 96374 THER/PROPH/DIAG INJ IV PUSH: CPT

## 2023-05-15 PROCEDURE — 72100 X-RAY EXAM L-S SPINE 2/3 VWS: CPT

## 2023-05-15 PROCEDURE — 2580000003 HC RX 258: Performed by: EMERGENCY MEDICINE

## 2023-05-15 PROCEDURE — 80053 COMPREHEN METABOLIC PANEL: CPT

## 2023-05-15 PROCEDURE — 93005 ELECTROCARDIOGRAM TRACING: CPT | Performed by: EMERGENCY MEDICINE

## 2023-05-15 PROCEDURE — 99285 EMERGENCY DEPT VISIT HI MDM: CPT

## 2023-05-15 PROCEDURE — 36415 COLL VENOUS BLD VENIPUNCTURE: CPT

## 2023-05-15 PROCEDURE — 84484 ASSAY OF TROPONIN QUANT: CPT

## 2023-05-15 PROCEDURE — 85025 COMPLETE CBC W/AUTO DIFF WBC: CPT

## 2023-05-15 RX ORDER — 0.9 % SODIUM CHLORIDE 0.9 %
500 INTRAVENOUS SOLUTION INTRAVENOUS ONCE
Status: COMPLETED | OUTPATIENT
Start: 2023-05-15 | End: 2023-05-15

## 2023-05-15 RX ORDER — KETOROLAC TROMETHAMINE 10 MG/1
10 TABLET, FILM COATED ORAL 3 TIMES DAILY
Qty: 15 TABLET | Refills: 0 | Status: SHIPPED | OUTPATIENT
Start: 2023-05-15

## 2023-05-15 RX ORDER — CYCLOBENZAPRINE HCL 10 MG
10 TABLET ORAL 3 TIMES DAILY PRN
Qty: 12 TABLET | Refills: 0 | Status: SHIPPED | OUTPATIENT
Start: 2023-05-15

## 2023-05-15 RX ORDER — KETOROLAC TROMETHAMINE 30 MG/ML
15 INJECTION, SOLUTION INTRAMUSCULAR; INTRAVENOUS ONCE
Status: COMPLETED | OUTPATIENT
Start: 2023-05-15 | End: 2023-05-15

## 2023-05-15 RX ADMIN — KETOROLAC TROMETHAMINE 15 MG: 30 INJECTION, SOLUTION INTRAMUSCULAR at 11:26

## 2023-05-15 RX ADMIN — SODIUM CHLORIDE 500 ML: 9 INJECTION, SOLUTION INTRAVENOUS at 11:26

## 2023-05-15 ASSESSMENT — PAIN DESCRIPTION - LOCATION
LOCATION: BACK
LOCATION: BACK

## 2023-05-15 ASSESSMENT — PAIN DESCRIPTION - DESCRIPTORS
DESCRIPTORS: ACHING
DESCRIPTORS: ACHING

## 2023-05-15 ASSESSMENT — PAIN - FUNCTIONAL ASSESSMENT: PAIN_FUNCTIONAL_ASSESSMENT: 0-10

## 2023-05-15 ASSESSMENT — PAIN DESCRIPTION - ORIENTATION
ORIENTATION: LOWER
ORIENTATION: LOWER

## 2023-05-15 ASSESSMENT — PAIN DESCRIPTION - FREQUENCY: FREQUENCY: CONTINUOUS

## 2023-05-15 ASSESSMENT — PAIN SCALES - GENERAL
PAINLEVEL_OUTOF10: 10
PAINLEVEL_OUTOF10: 10

## 2023-05-15 NOTE — ED PROVIDER NOTES
[facility] EMERGENCY DEPT  EMERGENCY DEPARTMENT ENCOUNTER      Pt Name: Deisy Robin  MRN: [id number]  [place] 1935  Date of evaluation: 5/15/2023  Provider: Royal Carmen MD    [address] [place] [address]       Chief Complaint   Patient presents with    Dizziness    Back Pain         HISTORY OF PRESENT ILLNESS   (Location/Symptom, Timing/Onset, Context/Setting, Quality, Duration, Modifying Factors, Severity)  Note limiting factors. 82year-old with a history of hypertension, A-fib, palpitations, splenic infarct, splenic vein thrombosis, stroke, vertigo. He presents accompanied by his wife and daughter with complaints of low back pain. It started about 3 weeks ago after \"moving stuff. \"  It is gradually worsened. It does not radiate. He has tried Tylenol, gabapentin, lidocaine patches without relief. He denies any lower extremity weakness, tingling, numbness. No bowel or bladder incontinence. He has a history of low back problems but states that he typically gets better within a week or 2. He also complains of lightheadedness. He has a history of vertigo and states that his symptoms are consistent with that. No fever, chest pain, dyspnea. He has had good p.o. intake. No nausea or vomiting. Review of External Medical Records:     Nursing Notes were reviewed. REVIEW OF SYSTEMS    (2-9 systems for level 4, 10 or more for level 5)     Review of Systems    Except as noted above the remainder of the review of systems was reviewed and negative.        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

## 2023-05-15 NOTE — ED NOTES
Verbal shift change report given to Alberto Baez RN  (oncoming nurse) by Reagan Jean-Baptiste RN (offgoing nurse). Report included the following information Nurse Handoff Report, ED Encounter Summary, MAR, Recent Results, and Med Rec Status.        Yesenia Escamilla RN  05/15/23 8713

## 2023-05-15 NOTE — ED TRIAGE NOTES
Pt arrives from home with c/o intermittent dizziness x 3 weeks. Pt arrives w/o and reports lightheadedness at this time. Pt denies changes in vision, SOB, chest pain, or headache. Pt also reports 10/10 back pain after moving some things in a room x 1 week.

## 2023-05-16 LAB
EKG ATRIAL RATE: 62 BPM
EKG DIAGNOSIS: NORMAL
EKG P AXIS: 84 DEGREES
EKG P-R INTERVAL: 214 MS
EKG Q-T INTERVAL: 454 MS
EKG QRS DURATION: 138 MS
EKG QTC CALCULATION (BAZETT): 460 MS
EKG R AXIS: 30 DEGREES
EKG T AXIS: 105 DEGREES
EKG VENTRICULAR RATE: 62 BPM

## 2024-01-05 ENCOUNTER — HOSPITAL ENCOUNTER (EMERGENCY)
Facility: HOSPITAL | Age: 89
Discharge: HOME | End: 2024-01-05
Attending: EMERGENCY MEDICINE
Payer: MEDICARE

## 2024-01-05 VITALS
BODY MASS INDEX: 29.8 KG/M2 | RESPIRATION RATE: 20 BRPM | TEMPERATURE: 97.8 F | SYSTOLIC BLOOD PRESSURE: 128 MMHG | HEIGHT: 72 IN | OXYGEN SATURATION: 92 % | HEART RATE: 93 BPM | WEIGHT: 220 LBS | DIASTOLIC BLOOD PRESSURE: 72 MMHG

## 2024-01-05 DIAGNOSIS — R33.9 URINARY RETENTION: Primary | ICD-10-CM

## 2024-01-05 LAB
APPEARANCE UR: CLEAR
BACTERIA URNS QL MICRO: NEGATIVE /HPF
BILIRUB UR QL: NEGATIVE
COLOR UR: ABNORMAL
EPITH CASTS URNS QL MICRO: ABNORMAL /LPF
GLUCOSE UR STRIP.AUTO-MCNC: NEGATIVE MG/DL
HGB UR QL STRIP: ABNORMAL
KETONES UR QL STRIP.AUTO: NEGATIVE MG/DL
LEUKOCYTE ESTERASE UR QL STRIP.AUTO: NEGATIVE
NITRITE UR QL STRIP.AUTO: NEGATIVE
PH UR STRIP: 7 (ref 5–8)
PROT UR STRIP-MCNC: NEGATIVE MG/DL
RBC #/AREA URNS HPF: ABNORMAL /HPF (ref 0–5)
SP GR UR REFRACTOMETRY: 1.01 (ref 1–1.03)
SPECIMEN HOLD: NORMAL
UROBILINOGEN UR QL STRIP.AUTO: 0.2 EU/DL (ref 0.2–1)
WBC URNS QL MICRO: ABNORMAL /HPF (ref 0–4)

## 2024-01-05 PROCEDURE — 6370000000 HC RX 637 (ALT 250 FOR IP): Performed by: EMERGENCY MEDICINE

## 2024-01-05 PROCEDURE — 99283 EMERGENCY DEPT VISIT LOW MDM: CPT

## 2024-01-05 PROCEDURE — 51798 US URINE CAPACITY MEASURE: CPT

## 2024-01-05 PROCEDURE — 81001 URINALYSIS AUTO W/SCOPE: CPT

## 2024-01-05 PROCEDURE — 51702 INSERT TEMP BLADDER CATH: CPT

## 2024-01-05 RX ORDER — CEFDINIR 300 MG/1
300 CAPSULE ORAL
Status: COMPLETED | OUTPATIENT
Start: 2024-01-05 | End: 2024-01-05

## 2024-01-05 RX ORDER — CEFDINIR 300 MG/1
300 CAPSULE ORAL 2 TIMES DAILY
Qty: 20 CAPSULE | Refills: 0 | Status: SHIPPED | OUTPATIENT
Start: 2024-01-05 | End: 2024-01-15

## 2024-01-05 RX ADMIN — CEFDINIR 300 MG: 300 CAPSULE ORAL at 04:00

## 2024-01-05 ASSESSMENT — PAIN DESCRIPTION - LOCATION: LOCATION: OTHER (COMMENT)

## 2024-01-05 ASSESSMENT — LIFESTYLE VARIABLES
HOW OFTEN DO YOU HAVE A DRINK CONTAINING ALCOHOL: NEVER
HOW MANY STANDARD DRINKS CONTAINING ALCOHOL DO YOU HAVE ON A TYPICAL DAY: PATIENT DOES NOT DRINK

## 2024-01-05 ASSESSMENT — PAIN SCALES - GENERAL: PAINLEVEL_OUTOF10: 10

## 2024-01-05 ASSESSMENT — PAIN - FUNCTIONAL ASSESSMENT: PAIN_FUNCTIONAL_ASSESSMENT: 0-10

## 2024-01-05 NOTE — ED PROVIDER NOTES
EMERGENCY DEPARTMENT PHYSICIAN NOTE     Patient: Sang Galloway     Time of Service: 1/5/2024  3:24 AM     Chief complaint:   Chief Complaint   Patient presents with    Urinary Retention    Urinary Frequency        HISTORY:  Patient is a 88 y.o. male who presents to the emergency department with complaints of urinary retention.       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    Vertigo         Past Surgical History:   Procedure Laterality Date    CHOLECYSTECTOMY  10/02/2020    Robotic-assisted laparoscopic with firefly.    TONSILLECTOMY AND ADENOIDECTOMY          Family History   Problem Relation Age of Onset    Cancer Mother     Stroke Mother     Cancer Father         Social History     Socioeconomic History    Marital status:    Tobacco Use    Smoking status: Never    Smokeless tobacco: Never   Substance and Sexual Activity    Alcohol use: No    Drug use: Never        Current Medications: Reviewed in chart.    Allergies:   Allergies   Allergen Reactions    Beta Adrenergic Blockers Other (See Comments)     \"It gives me a heart attack\", per pt med causes chest to swell and chest pain.  7/3/19 - pt, spouse and child deny this allergy he has been taking propranolol since Feb 2019 without issue    Penicillins      Other reaction(s): Unknown (comments)          REVIEW OF SYSTEMS: See HPI for pertinent positives and negatives.      PHYSICAL EXAM:  /68   Pulse 93   Temp 97.8 °F (36.6 °C) (Oral)   Resp 20   Ht 1.829 m (6')   Wt 99.8 kg (220 lb)   SpO2 94%   BMI 29.84 kg/m²    Physical  bedside.  I have provided the opportunity to ask questions, and have addressed all questions at the bedside.    Expectations, return precautions verbalized at bedside.            At this time I do feel patient is stable for discharge. I feel no further laboratory evaluation/imaging is indicated. At this time patient will be discharged in stable and non toxic condition. Patient has been advised to return for new, worsening, concerning symptoms.  Patient had all their questions answered and were agreeable to this plan            * Document created with voice recognition software which can lead to typographical errors.    Amount and/or Complexity of Data Reviewed  Labs: ordered.    Risk  Prescription drug management.          Procedures       DISPOSITION: Decision To Discharge 01/05/2024 04:10:13 AM    CLINICAL IMPRESSION:   1. Urinary retention         Further personalized recommendations for outpatient care as below.    Key discharge instructions and summary of care provided in AVS:     Prescriptions provided to the patient:     New Prescriptions    CEFDINIR (OMNICEF) 300 MG CAPSULE    Take 1 capsule by mouth 2 times daily for 10 days        Miko Quinteros DO   Emergency Medicine Attending Physician          Miko Quinteros DO  01/05/24 4896

## 2024-01-05 NOTE — ED TRIAGE NOTES
Pt arrives POV c/o urinary retention. Pt states currently being on Flomax 2x day and sulfa abx for his current bladder infection after some flu like illness 2wks ago. Pt states having a similar problem 2 years ago. Pt also sates having urgency and burning prior to anuria.

## 2024-01-05 NOTE — DISCHARGE INSTRUCTIONS
Routine appointments for health maintenance with a primary care provider are very important and emergency department visits are no substitute.  You should review all findings and test results from your visit today with your primary care physician.        We recommended that you take medications as prescribed.    Keep the Paris catheter in place until you follow-up with urology.  Discontinue taking the Bactrim and start new antibiotic cefdinir    Return to the emergency department for any new or concerning signs/symptoms or failure to improve.

## 2024-01-05 NOTE — FLOWSHEET NOTE
I have reviewed discharge instructions with the patient and spouse.  The patient and spouse verbalized understanding. Pt provided w/ leg bag

## 2025-05-14 ENCOUNTER — APPOINTMENT (OUTPATIENT)
Facility: HOSPITAL | Age: 89
End: 2025-05-14
Payer: MEDICARE

## 2025-05-14 ENCOUNTER — HOSPITAL ENCOUNTER (EMERGENCY)
Facility: HOSPITAL | Age: 89
Discharge: HOME | End: 2025-05-14
Attending: STUDENT IN AN ORGANIZED HEALTH CARE EDUCATION/TRAINING PROGRAM
Payer: MEDICARE

## 2025-05-14 VITALS
HEART RATE: 63 BPM | HEIGHT: 72 IN | SYSTOLIC BLOOD PRESSURE: 164 MMHG | TEMPERATURE: 98.1 F | WEIGHT: 220 LBS | RESPIRATION RATE: 16 BRPM | OXYGEN SATURATION: 98 % | DIASTOLIC BLOOD PRESSURE: 82 MMHG | BODY MASS INDEX: 29.8 KG/M2

## 2025-05-14 DIAGNOSIS — M79.89 SWELLING OF RIGHT LOWER EXTREMITY: Primary | ICD-10-CM

## 2025-05-14 LAB — ECHO BSA: 2.25 M2

## 2025-05-14 PROCEDURE — 99284 EMERGENCY DEPT VISIT MOD MDM: CPT

## 2025-05-14 PROCEDURE — 93971 EXTREMITY STUDY: CPT

## 2025-05-14 ASSESSMENT — LIFESTYLE VARIABLES
HOW MANY STANDARD DRINKS CONTAINING ALCOHOL DO YOU HAVE ON A TYPICAL DAY: PATIENT DOES NOT DRINK
HOW OFTEN DO YOU HAVE A DRINK CONTAINING ALCOHOL: NEVER

## 2025-05-14 ASSESSMENT — ENCOUNTER SYMPTOMS: SHORTNESS OF BREATH: 0

## 2025-05-14 NOTE — DISCHARGE INSTRUCTIONS
Do warm compresses and wear compression socks.     Follow-up with your primary care provider for persistent discomfort.

## 2025-05-14 NOTE — ED TRIAGE NOTES
Pt ambulatory to ED w/ c/o right leg swelling x 1 week. Pt has hx of DVT. Pt taking eliquis for stroke hx. Denies redness or warmth to leg. Denies injuries or falls. Denies CP and SOB.

## 2025-05-14 NOTE — ED PROVIDER NOTES
[place] EMERGENCY DEPARTMENT  EMERGENCY DEPARTMENT ENCOUNTER      Pt Name: Sang Galloway  MRN: [id number]  Birthdate 1935  Date of evaluation: 5/14/2025  Provider: KEVIN Giles NP    CHIEF COMPLAINT       Chief Complaint   Patient presents with    Leg Swelling         HISTORY OF PRESENT ILLNESS   (Location/Symptom, Timing/Onset, Context/Setting, Quality, Duration, Modifying Factors, Severity)  Note limiting factors.   Sang Galloway is a 89 y.o. male presenting to the ED c/o right lower leg swelling for the past week but wanted to r/o DVT. Pt reports \"[the leg] doesn't look swollen but it feels swollen\". Pt reports symptoms feel similar to previous DVT. Pt is currently on Eliquis.     Denies recent travels, chest pains, sob, falls/ injury.     The history is provided by the patient. No  was used.         Review of External Medical Records:     Nursing Notes were reviewed.    REVIEW OF SYSTEMS    (2-9 systems for level 4, 10 or more for level 5)     Review of Systems   Constitutional:  Negative for activity change, appetite change and fever.   Respiratory:  Negative for shortness of breath.    Cardiovascular:  Positive for leg swelling. Negative for chest pain.   Skin:  Negative for rash and wound.   All other systems reviewed and are negative.      Except as noted above the remainder of the review of systems was reviewed and negative.       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    Vertigo          SURGICAL HISTORY       Past Surgical History:

## (undated) DEVICE — VISUALIZATION SYSTEM: Brand: CLEARIFY

## (undated) DEVICE — CANISTER, RIGID, 3000CC: Brand: MEDLINE INDUSTRIES, INC.

## (undated) DEVICE — TUBING, SUCTION, 1/4" X 10', STRAIGHT: Brand: MEDLINE

## (undated) DEVICE — SUTURE SZ 0 27IN 5/8 CIR UR-6  TAPER PT VIOLET ABSRB VICRYL J603H

## (undated) DEVICE — LAPAROSCOPIC TROCAR SLEEVE/SINGLE USE: Brand: KII® OPTICAL ACCESS SYSTEM

## (undated) DEVICE — SPONGE LAP 18X18IN STRL -- 5/PK

## (undated) DEVICE — DRAIN SURG 19FR 0.25IN SIL RND W/ TRCR INDIC DOT RADPQ FULL

## (undated) DEVICE — MASTISOL ADHESIVE LIQ 2/3ML

## (undated) DEVICE — SEAL UNIV 5-8MM DISP BX/10 -- DA VINCI XI - SNGL USE

## (undated) DEVICE — STERILE POLYISOPRENE POWDER-FREE SURGICAL GLOVES: Brand: PROTEXIS

## (undated) DEVICE — SPONGE DRAIN NONWOVEN 4X4IN -- 2/PK

## (undated) DEVICE — SUT ETHLN 2-0 18IN FS BLK --

## (undated) DEVICE — ELECTRO LUBE IS A SINGLE PATIENT USE DEVICE THAT IS INTENDED TO BE USED ON ELECTROSURGICAL ELECTRODES TO REDUCE STICKING.: Brand: KEY SURGICAL ELECTRO LUBE

## (undated) DEVICE — DRAPE,REIN 53X77,STERILE: Brand: MEDLINE

## (undated) DEVICE — STRIP,CLOSURE,WOUND,MEDI-STRIP,1/2X4: Brand: MEDLINE

## (undated) DEVICE — PREP SKN CHLRAPRP APL 26ML STR --

## (undated) DEVICE — STRAP,POSITIONING,KNEE/BODY,FOAM,4X60": Brand: MEDLINE

## (undated) DEVICE — REM POLYHESIVE ADULT PATIENT RETURN ELECTRODE: Brand: VALLEYLAB

## (undated) DEVICE — NEEDLE HYPO 22GA L1.5IN BLK S STL HUB POLYPR SHLD REG BVL

## (undated) DEVICE — YANKAUER,BULB TIP,W/O VENT,RIGID,STERILE: Brand: MEDLINE

## (undated) DEVICE — TROCAR: Brand: KII® OPTICAL ACCESS SYSTEM

## (undated) DEVICE — TISSUE RETRIEVAL SYSTEM: Brand: INZII RETRIEVAL SYSTEM

## (undated) DEVICE — AIRSEAL BIFURCATED SMOKE EVAC FILTERED TUBE SET: Brand: AIRSEAL

## (undated) DEVICE — SUTURE MCRYL SZ 4-0 L27IN ABSRB UD L19MM PS-2 1/2 CIR PRIM Y426H

## (undated) DEVICE — SUTURE EASE CROSSBOW CLSR SYS

## (undated) DEVICE — DEVICE TRNSF SPIK STL 2008S] MICROTEK MEDICAL INC]

## (undated) DEVICE — COVER,MAYO STAND,STERILE: Brand: MEDLINE

## (undated) DEVICE — INFECTION CONTROL KIT SYS

## (undated) DEVICE — COVER MPLR TIP CRV SCIS ACC DA VINCI

## (undated) DEVICE — RESERVOIR,SUCTION,100CC,SILICONE: Brand: MEDLINE

## (undated) DEVICE — ANCHOR TISSUE RETRIEVAL SYSTEM, BAG SIZE 125 ML, PORT SIZE 8 MM: Brand: ANCHOR TISSUE RETRIEVAL SYSTEM

## (undated) DEVICE — SOL IRRIGATION INJ NACL 0.9% 500ML BTL

## (undated) DEVICE — COVER LT HNDL PLAS RIG 1 PER PK

## (undated) DEVICE — BLADELESS OBTURATOR: Brand: WECK VISTA

## (undated) DEVICE — CARTRIDGE CLP LIG HEMLOK GRN --

## (undated) DEVICE — 3M™ IOBAN™ 2 ANTIMICROBIAL INCISE DRAPE 6648EZ: Brand: IOBAN™ 2

## (undated) DEVICE — ARM DRAPE

## (undated) DEVICE — SURGICAL PROCEDURE KIT GEN LAPAROSCOPY LF

## (undated) DEVICE — BNDG ADH FABRIC 2X4IN ST LF --

## (undated) DEVICE — TOWEL SURG W17XL27IN STD BLU COT NONFENESTRATED PREWASHED

## (undated) DEVICE — Device